# Patient Record
Sex: FEMALE | Race: BLACK OR AFRICAN AMERICAN | NOT HISPANIC OR LATINO | Employment: OTHER | URBAN - METROPOLITAN AREA
[De-identification: names, ages, dates, MRNs, and addresses within clinical notes are randomized per-mention and may not be internally consistent; named-entity substitution may affect disease eponyms.]

---

## 2017-05-07 ENCOUNTER — HOSPITAL ENCOUNTER (EMERGENCY)
Facility: HOSPITAL | Age: 62
Discharge: HOME | End: 2017-05-07
Attending: EMERGENCY MEDICINE
Payer: COMMERCIAL

## 2017-05-07 VITALS
DIASTOLIC BLOOD PRESSURE: 76 MMHG | RESPIRATION RATE: 22 BRPM | SYSTOLIC BLOOD PRESSURE: 116 MMHG | WEIGHT: 219 LBS | TEMPERATURE: 98 F | BODY MASS INDEX: 36.49 KG/M2 | OXYGEN SATURATION: 96 % | HEART RATE: 87 BPM | HEIGHT: 65 IN

## 2017-05-07 DIAGNOSIS — R06.02 SOB (SHORTNESS OF BREATH): ICD-10-CM

## 2017-05-07 DIAGNOSIS — R07.9 CHEST PAIN, UNSPECIFIED TYPE: Primary | ICD-10-CM

## 2017-05-07 LAB
ALBUMIN SERPL BCP-MCNC: 3.4 G/DL
ALP SERPL-CCNC: 67 U/L
ALT SERPL W/O P-5'-P-CCNC: 28 U/L
ANION GAP SERPL CALC-SCNC: 11 MMOL/L
AST SERPL-CCNC: 26 U/L
BASOPHILS # BLD AUTO: 0.05 K/UL
BASOPHILS NFR BLD: 0.9 %
BILIRUB SERPL-MCNC: 0.2 MG/DL
BILIRUB UR QL STRIP: NEGATIVE
BNP SERPL-MCNC: 15 PG/ML
BUN SERPL-MCNC: 13 MG/DL
CALCIUM SERPL-MCNC: 9.2 MG/DL
CHLORIDE SERPL-SCNC: 103 MMOL/L
CLARITY UR: CLEAR
CO2 SERPL-SCNC: 23 MMOL/L
COLOR UR: YELLOW
CREAT SERPL-MCNC: 0.8 MG/DL
DIFFERENTIAL METHOD: ABNORMAL
EOSINOPHIL # BLD AUTO: 0.1 K/UL
EOSINOPHIL NFR BLD: 2.4 %
ERYTHROCYTE [DISTWIDTH] IN BLOOD BY AUTOMATED COUNT: 14.2 %
EST. GFR  (AFRICAN AMERICAN): >60 ML/MIN/1.73 M^2
EST. GFR  (NON AFRICAN AMERICAN): >60 ML/MIN/1.73 M^2
GLUCOSE SERPL-MCNC: 190 MG/DL
GLUCOSE UR QL STRIP: NEGATIVE
HCT VFR BLD AUTO: 38.7 %
HGB BLD-MCNC: 13.2 G/DL
HGB UR QL STRIP: NEGATIVE
KETONES UR QL STRIP: NEGATIVE
LEUKOCYTE ESTERASE UR QL STRIP: NEGATIVE
LYMPHOCYTES # BLD AUTO: 1.3 K/UL
LYMPHOCYTES NFR BLD: 23.2 %
MCH RBC QN AUTO: 31.1 PG
MCHC RBC AUTO-ENTMCNC: 34.1 %
MCV RBC AUTO: 91 FL
MONOCYTES # BLD AUTO: 0.3 K/UL
MONOCYTES NFR BLD: 5.6 %
NEUTROPHILS # BLD AUTO: 3.9 K/UL
NEUTROPHILS NFR BLD: 67.7 %
NITRITE UR QL STRIP: NEGATIVE
PH UR STRIP: 6 [PH] (ref 5–8)
PLATELET # BLD AUTO: 218 K/UL
PMV BLD AUTO: 9.1 FL
POTASSIUM SERPL-SCNC: 4 MMOL/L
PROT SERPL-MCNC: 7.4 G/DL
PROT UR QL STRIP: NEGATIVE
RBC # BLD AUTO: 4.24 M/UL
SODIUM SERPL-SCNC: 137 MMOL/L
SP GR UR STRIP: >=1.03 (ref 1–1.03)
TROPONIN I SERPL DL<=0.01 NG/ML-MCNC: <0.006 NG/ML
URN SPEC COLLECT METH UR: ABNORMAL
UROBILINOGEN UR STRIP-ACNC: 1 EU/DL
WBC # BLD AUTO: 5.73 K/UL

## 2017-05-07 PROCEDURE — 85025 COMPLETE CBC W/AUTO DIFF WBC: CPT

## 2017-05-07 PROCEDURE — 25000003 PHARM REV CODE 250: Performed by: EMERGENCY MEDICINE

## 2017-05-07 PROCEDURE — 84484 ASSAY OF TROPONIN QUANT: CPT

## 2017-05-07 PROCEDURE — 80053 COMPREHEN METABOLIC PANEL: CPT

## 2017-05-07 PROCEDURE — 63600175 PHARM REV CODE 636 W HCPCS: Performed by: EMERGENCY MEDICINE

## 2017-05-07 PROCEDURE — 96374 THER/PROPH/DIAG INJ IV PUSH: CPT

## 2017-05-07 PROCEDURE — 81003 URINALYSIS AUTO W/O SCOPE: CPT

## 2017-05-07 PROCEDURE — 83880 ASSAY OF NATRIURETIC PEPTIDE: CPT

## 2017-05-07 PROCEDURE — 96375 TX/PRO/DX INJ NEW DRUG ADDON: CPT

## 2017-05-07 PROCEDURE — 99284 EMERGENCY DEPT VISIT MOD MDM: CPT | Mod: 25

## 2017-05-07 PROCEDURE — 93005 ELECTROCARDIOGRAM TRACING: CPT

## 2017-05-07 RX ORDER — BUDESONIDE AND FORMOTEROL FUMARATE DIHYDRATE 160; 4.5 UG/1; UG/1
2 AEROSOL RESPIRATORY (INHALATION) EVERY 12 HOURS
COMMUNITY

## 2017-05-07 RX ORDER — HYDROCHLOROTHIAZIDE 25 MG/1
25 TABLET ORAL DAILY
COMMUNITY
End: 2019-01-17

## 2017-05-07 RX ORDER — IBUPROFEN 600 MG/1
600 TABLET ORAL EVERY 6 HOURS PRN
Qty: 30 TABLET | Refills: 0 | Status: SHIPPED | OUTPATIENT
Start: 2017-05-07 | End: 2020-12-18

## 2017-05-07 RX ORDER — OMEPRAZOLE 20 MG/1
20 CAPSULE, DELAYED RELEASE ORAL DAILY
COMMUNITY
End: 2020-12-11 | Stop reason: SDUPTHER

## 2017-05-07 RX ORDER — FOLIC ACID 1 MG/1
1 TABLET ORAL DAILY
COMMUNITY
End: 2020-12-18

## 2017-05-07 RX ORDER — LORAZEPAM 1 MG/1
1 TABLET ORAL EVERY 6 HOURS PRN
COMMUNITY
End: 2019-01-17

## 2017-05-07 RX ORDER — SIMVASTATIN 40 MG/1
40 TABLET, FILM COATED ORAL NIGHTLY
COMMUNITY
End: 2019-01-17

## 2017-05-07 RX ORDER — HYDROXYCHLOROQUINE SULFATE 200 MG/1
200 TABLET, FILM COATED ORAL DAILY
COMMUNITY
End: 2022-06-02 | Stop reason: SDUPTHER

## 2017-05-07 RX ORDER — ASPIRIN 325 MG
325 TABLET ORAL
Status: COMPLETED | OUTPATIENT
Start: 2017-05-07 | End: 2017-05-07

## 2017-05-07 RX ORDER — INSULIN GLARGINE 100 [IU]/ML
42 INJECTION, SOLUTION SUBCUTANEOUS NIGHTLY
COMMUNITY
End: 2019-10-16 | Stop reason: SDUPTHER

## 2017-05-07 RX ORDER — KETOROLAC TROMETHAMINE 30 MG/ML
10 INJECTION, SOLUTION INTRAMUSCULAR; INTRAVENOUS
Status: COMPLETED | OUTPATIENT
Start: 2017-05-07 | End: 2017-05-07

## 2017-05-07 RX ORDER — METHOTREXATE 2.5 MG/1
TABLET ORAL
COMMUNITY
End: 2019-01-17

## 2017-05-07 RX ORDER — CIPROFLOXACIN 500 MG/1
500 TABLET ORAL
COMMUNITY
End: 2019-01-17

## 2017-05-07 RX ORDER — METHOCARBAMOL 750 MG/1
1500 TABLET, FILM COATED ORAL 3 TIMES DAILY
Qty: 30 TABLET | Refills: 0 | Status: SHIPPED | OUTPATIENT
Start: 2017-05-07 | End: 2017-05-12

## 2017-05-07 RX ORDER — DIAZEPAM 10 MG/2ML
5 INJECTION INTRAMUSCULAR
Status: COMPLETED | OUTPATIENT
Start: 2017-05-07 | End: 2017-05-07

## 2017-05-07 RX ADMIN — ASPIRIN 325 MG ORAL TABLET 325 MG: 325 PILL ORAL at 02:05

## 2017-05-07 RX ADMIN — KETOROLAC TROMETHAMINE 10 MG: 30 INJECTION, SOLUTION INTRAMUSCULAR at 03:05

## 2017-05-07 RX ADMIN — DIAZEPAM 5 MG: 5 INJECTION, SOLUTION INTRAMUSCULAR; INTRAVENOUS at 03:05

## 2017-05-07 NOTE — ED NOTES
Pt discharged home ambulatory with rx x 2 and instructions for home care and follow up care; pt stable

## 2017-05-07 NOTE — ED NOTES
Pt reports that she feels ready to go home; the pain is gone and the shortness of breath is much improved

## 2017-05-07 NOTE — DISCHARGE INSTRUCTIONS
Take medications as directed.  Follow up with your doctor this week.  Return to ER for any concerns or worsening symptoms.        Noncardiac Chest Pain    Based on your visit today, the health care provider doesnt know what is causing your chest pain. In most cases, people who come to the emergency department with chest pain dont have a problem with their heart. Instead, the pain is caused by other conditions. These may be problems with the lungs, muscles, bones, digestive tract, nerves, or mental health.  Lung problems  · Inflammation around the lungs (pleurisy)  · Collapsed lung (pneumothorax)  · Fluid around the lungs (pleural effusion)  · Lung cancer. This is a rare cause of chest pain.  Muscle or bone problems  · Inflamed cartilage between the ribs (pleurisy)  · Fibromyalgia  · Rheumatoid arthritis  Digestive system problems  · Reflux  · Stomach ulcer  · Spasms of the esophagus  · Gall stones  · Gallbladder inflammation  Mental health conditions  · Panic or anxiety attacks  · Emotional distress  Your condition doesnt seem serious and your pain doesnt appear to be coming from your heart. But sometimes the signs of a serious problem take more time to appear. Watch for the warning signs listed below.  Home care  Follow these guidelines when caring for yourself at home:  · Rest today and avoid strenuous activity.  · Take any prescribed medicine as directed.  Follow-up care  Follow up with your health care provider, or as advised, if you dont start to feel better within 24 hours.  When to seek medical advice  Call your health care provider right away if any of these occur:  · A change in the type of pain. Call if it feels different, becomes more serious, lasts longer, or begins to spread into your shoulder, arm, neck, jaw, or back.  · Shortness of breath  · You feel more pain when you breathe  · Cough with dark-colored mucus or blood  · Weakness, dizziness, or fainting  · Fever of 100.4ºF (38ºC) or higher, or  as directed by your health care provider  · Swelling, pain, or redness in one leg  Date Last Reviewed: 11/24/2014  © 7038-5483 [employer]. [address] [place] [address], [place] [address]. All rights reserved. This information is not intended as a substitute for professional medical care. Always follow your healthcare professional's instructions.

## 2017-05-07 NOTE — ED PROVIDER NOTES
Encounter Date: 5/7/2017       History     Chief Complaint   Patient presents with    Chest Pain     CP x 4 hours, mid sternal, radiates to right arm; laying down when pain started in shoulder.     Shortness of Breath     asthma hx, took inhaler tonight, did not relieve      Review of patient's allergies indicates:   Allergen Reactions    Penicillins     Phenergan [promethazine] Nausea Only    Xanax [alprazolam]      61F smoker with HTN, DM, Hep B, asthma, and RA presents with acute onset of chest pain with associated SOB.  CP began around 8pm last night.  She was about to go to bed.  She reports a pressure in her right chest that radiates to her right neck and shoulder and is worse with deep breaths.  It is not sore to touch.  She reports associated SOB.  She used her inhaler with no relief.  Symptoms are intermittent and severe when present.      The history is provided by the patient.     Past Medical History:   Diagnosis Date    Anxiety     Arthritis     Asthma     Back pain     Depression     Diabetes mellitus     Eczema     GERD (gastroesophageal reflux disease)     Hepatitis B     Hyperlipidemia     Hypertension     Seizures      History reviewed. No pertinent surgical history.  History reviewed. No pertinent family history.  Social History   Substance Use Topics    Smoking status: Current Every Day Smoker     Packs/day: 0.50    Smokeless tobacco: None    Alcohol use No     Review of Systems   Constitutional: Negative for chills and fever.   Respiratory: Positive for cough, shortness of breath and wheezing.    Cardiovascular: Positive for chest pain. Negative for palpitations.   All other systems reviewed and are negative.      Physical Exam   Initial Vitals   BP Pulse Resp Temp SpO2   05/07/17 0154 05/07/17 0154 05/07/17 0154 05/07/17 0154 05/07/17 0154   167/87 69 22 98 °F (36.7 °C) 99 %     Physical Exam    Nursing note and vitals reviewed.  Constitutional: She appears well-developed  and well-nourished. No distress.   HENT:   Head: Normocephalic.   Mouth/Throat: Oropharynx is clear and moist.   Eyes: Conjunctivae are normal.   Neck: Normal range of motion.   Cardiovascular: Normal rate, regular rhythm and normal heart sounds.   Pulmonary/Chest: Breath sounds normal. No respiratory distress.   Abdominal: Bowel sounds are normal. She exhibits no distension.   Musculoskeletal: Normal range of motion.   Neurological: She is alert and oriented to person, place, and time.   Skin: Skin is warm and dry.   Psychiatric: She has a normal mood and affect. Her behavior is normal.         ED Course   Procedures  Labs Reviewed   CBC W/ AUTO DIFFERENTIAL - Abnormal; Notable for the following:        Result Value    MCH 31.1 (*)     MPV 9.1 (*)     All other components within normal limits   COMPREHENSIVE METABOLIC PANEL   TROPONIN I   B-TYPE NATRIURETIC PEPTIDE   URINALYSIS     EKG Readings: (Independently Interpreted)   Initial Reading: No STEMI. Rhythm: Normal Sinus Rhythm. Heart Rate: 78. Ectopy: No Ectopy. Conduction: Normal. T Waves Flipped: III, AVF, V2, V3 and V4. Clinical Impression: Normal Sinus Rhythm          Medical Decision Making:   Independently Interpreted Test(s):   I have ordered and independently interpreted EKG Reading(s) - see prior notes  Clinical Tests:   Lab Tests: Ordered and Reviewed  Radiological Study: Ordered and Reviewed  Medical Tests: Reviewed and Ordered  ED Management:  R sided CP since last night.  CP >6 hours with no evidence of AMI.  No signs of mass, pneumonia, pneumothorax, or pulmonary edema.  I do not suspect PE.  I will treat with muscle relaxer.                   ED Course     Clinical Impression:   There were no encounter diagnoses.          Carmen Medina MD  06/02/17 1937

## 2017-05-07 NOTE — ED AVS SNAPSHOT
[facility] [address]  [place] [address]               Margarita Orourke   2017  2:11 AM   ED    Description:  Female : 1955   Department:  [facility]           Your Care was Coordinated By:     Provider Role From To    Carmen Medina MD Attending Provider 17 0227 --      Reason for Visit     Chest Pain     Shortness of Breath           Diagnoses this Visit        Comments    Chest pain, unspecified type    -  Primary     SOB (shortness of breath)           ED Disposition     None           To Do List           Follow-up Information     Follow up with Shivam Paredes MD.    Specialty:  Internal Medicine    Why:  As needed    Contact information:    [address] [place] [address]  [phone]         These Medications        Disp Refills Start End    methocarbamol (ROBAXIN) 750 MG Tab 30 tablet 0 2017    Take 2 tablets (1,500 mg total) by mouth 3 (three) times daily. - Oral    ibuprofen (ADVIL,MOTRIN) 600 MG tablet 30 tablet 0 2017     Take 1 tablet (600 mg total) by mouth every 6 (six) hours as needed for Pain. - Oral      [facility]     [facility] Care Line -  Assistance  Unless otherwise directed by your provider, please contact [facility], our nurse care line that is available for  assistance.     Registered nurses in the [facility] provide: appointment scheduling, clinical advisement, health education, and other advisory services.  Call: [phone] (toll free)               Medications           Message regarding Medications     Verify the changes and/or additions to your medication regime listed below are the same as discussed with your clinician today.  If any of these changes or additions are incorrect, please notify your healthcare provider.        START taking these NEW medications        Refills    methocarbamol (ROBAXIN) 750 MG Tab 0    Sig: Take 2  tablets (1,500 mg total) by mouth 3 (three) times daily.    Class: Print    Route: Oral    ibuprofen (ADVIL,MOTRIN) 600 MG tablet 0    Sig: Take 1 tablet (600 mg total) by mouth every 6 (six) hours as needed for Pain.    Class: Print    Route: Oral      These medications were administered today        Dose Freq    aspirin tablet 325 mg 325 mg ED 1 Time    Sig: Take 1 tablet (325 mg total) by mouth ED 1 Time.    Class: Normal    Route: Oral    ketorolac injection 10 mg 10 mg ED 1 Time    Sig: Inject 10 mg into the vein ED 1 Time.    Class: Normal    Route: Intravenous    diazePAM injection 5 mg 5 mg ED 1 Time    Sig: Inject 1 mL (5 mg total) into the vein ED 1 Time.    Class: Normal    Route: Intravenous           Verify that the below list of medications is an accurate representation of the medications you are currently taking.  If none reported, the list may be blank. If incorrect, please contact your healthcare provider. Carry this list with you in case of emergency.           Current Medications     budesonide-formoterol 160-4.5 mcg (SYMBICORT) 160-4.5 mcg/actuation HFAA Inhale 2 puffs into the lungs every 12 (twelve) hours. Controller    ciprofloxacin HCl (CIPRO) 500 MG tablet Take 500 mg by mouth every 12 (twelve) hours.    folic acid (FOLVITE) 1 MG tablet Take 1 mg by mouth once daily.    hydrochlorothiazide (HYDRODIURIL) 25 MG tablet Take 25 mg by mouth once daily.    hydroxychloroquine (PLAQUENIL) 200 mg tablet Take 200 mg by mouth once daily.    insulin glargine (LANTUS) 100 unit/mL injection Inject into the skin every evening.    lorazepam (ATIVAN) 1 MG tablet Take 1 mg by mouth every 6 (six) hours as needed for Anxiety.    methotrexate 2.5 MG Tab Take by mouth every 7 days.    omeprazole (PRILOSEC) 20 MG capsule Take 20 mg by mouth once daily.    simvastatin (ZOCOR) 40 MG tablet Take 40 mg by mouth every evening.    ibuprofen (ADVIL,MOTRIN) 600 MG tablet Take 1 tablet (600 mg total) by mouth every 6 (six)  "hours as needed for Pain.    methocarbamol (ROBAXIN) 750 MG Tab Take 2 tablets (1,500 mg total) by mouth 3 (three) times daily.           Clinical Reference Information           Your Vitals Were     BP Pulse Temp Resp Height Weight    116/76 (Patient Position: Sitting, BP Method: Automatic) 87 98 °F (36.7 °C) (Oral) 22 5' 5" (1.651 m) 99.3 kg (219 lb)    SpO2 BMI             96% 36.44 kg/m2         Allergies as of 5/7/2017        Reactions    Penicillins     Phenergan [Promethazine] Nausea Only    Xanax [Alprazolam]       Immunizations Administered on Date of Encounter - 5/7/2017     None      ED Micro, Lab, POCT     Start Ordered       Status Ordering Provider    05/07/17 0229 05/07/17 0229  CBC auto differential  STAT      Final result     05/07/17 0229 05/07/17 0229  Comprehensive metabolic panel  STAT      Final result     05/07/17 0229 05/07/17 0229  Troponin I  STAT      Final result     05/07/17 0229 05/07/17 0229  Brain natriuretic peptide  STAT      Final result     05/07/17 0229 05/07/17 0229  Urinalysis  STAT      Final result       ED Imaging Orders     Start Ordered       Status Ordering Provider    05/07/17 0229 05/07/17 0229  X-Ray Chest PA And Lateral  1 time imaging      Final result         Discharge Instructions       Take medications as directed.  Follow up with your doctor this week.  Return to ER for any concerns or worsening symptoms.        Noncardiac Chest Pain    Based on your visit today, the health care provider doesnt know what is causing your chest pain. In most cases, people who come to the emergency department with chest pain dont have a problem with their heart. Instead, the pain is caused by other conditions. These may be problems with the lungs, muscles, bones, digestive tract, nerves, or mental health.  Lung problems  · Inflammation around the lungs (pleurisy)  · Collapsed lung (pneumothorax)  · Fluid around the lungs (pleural effusion)  · Lung cancer. This is a rare cause of " chest pain.  Muscle or bone problems  · Inflamed cartilage between the ribs (pleurisy)  · Fibromyalgia  · Rheumatoid arthritis  Digestive system problems  · Reflux  · Stomach ulcer  · Spasms of the esophagus  · Gall stones  · Gallbladder inflammation  Mental health conditions  · Panic or anxiety attacks  · Emotional distress  Your condition doesnt seem serious and your pain doesnt appear to be coming from your heart. But sometimes the signs of a serious problem take more time to appear. Watch for the warning signs listed below.  Home care  Follow these guidelines when caring for yourself at home:  · Rest today and avoid strenuous activity.  · Take any prescribed medicine as directed.  Follow-up care  Follow up with your health care provider, or as advised, if you dont start to feel better within 24 hours.  When to seek medical advice  Call your health care provider right away if any of these occur:  · A change in the type of pain. Call if it feels different, becomes more serious, lasts longer, or begins to spread into your shoulder, arm, neck, jaw, or back.  · Shortness of breath  · You feel more pain when you breathe  · Cough with dark-colored mucus or blood  · Weakness, dizziness, or fainting  · Fever of 100.4ºF (38ºC) or higher, or as directed by your health care provider  · Swelling, pain, or redness in one leg  Date Last Reviewed: 11/24/2014  © 6570-3435 [employer]. [address] [place] [address]. All rights reserved. This information is not intended as a substitute for professional medical care. Always follow your healthcare professional's instructions.          [facility] Sign-Up     Activating your [facility] account is as easy as 1-2-3!     1) Visit [email or web address], select Sign Up Now, enter this activation code and your date of birth, then select Next.  [email or web address]  Expires: 6/21/2017  5:15 AM      2) Create a username and password to use when you visit [facility] in the  future and select a security question in case you lose your password and select Next.    3) Enter your e-mail address and click Sign Up!    Additional Information  If you have questions, please e-mail [email or web address] or call [phone] to talk to our [employer] staff. Remember, [facility] is NOT to be used for urgent needs. For medical emergencies, dial 911.         Smoking Cessation     If you would like to quit smoking:   You may be eligible for free services if you are a [place] resident and started smoking cigarettes before September 1, 1988.  Call the Smoking Cessation Trust (SCT) toll free at (813) 617-7064 or (018) 577-6470.   Call 3-857-QUIT-NOW if you do not meet the above criteria.   Contact us via email: [email or web address]   View our website for more information: [email or web address]         [facility] complies with applicable Federal civil rights laws and does not discriminate on the basis of race, color, national origin, age, disability, or sex.        Language Assistance Services     ATTENTION: Language assistance services are available, free of charge. Please call [phone].      ATENCIÓN: Si habla español, tiene a cote disposición servicios gratuitos de asistencia lingüística. Llame al [phone].     CHÚ Ý: N?u b?n nói Ti?ng Vi?t, có các d?ch v? h? tr? ngôn ng? mi?n phí dành cho b?n. G?i s? [phone].

## 2017-08-25 ENCOUNTER — HOSPITAL ENCOUNTER (OUTPATIENT)
Dept: RADIOLOGY | Facility: HOSPITAL | Age: 62
Discharge: HOME | End: 2017-08-25
Attending: PAIN MEDICINE
Payer: MEDICARE

## 2017-08-25 DIAGNOSIS — M19.019 OSTEOARTHROSIS, SHOULDER REGION: Primary | ICD-10-CM

## 2017-08-25 DIAGNOSIS — M19.019 OSTEOARTHROSIS, SHOULDER REGION: ICD-10-CM

## 2017-08-25 PROCEDURE — 73030 X-RAY EXAM OF SHOULDER: CPT | Mod: TC,50

## 2017-08-25 PROCEDURE — 73030 X-RAY EXAM OF SHOULDER: CPT | Mod: 26,50,, | Performed by: RADIOLOGY

## 2018-02-07 DIAGNOSIS — M06.9 RHA (RHEUMATOID ARTHRITIS): ICD-10-CM

## 2018-02-07 DIAGNOSIS — G89.29 CHRONIC PAIN: ICD-10-CM

## 2018-02-07 DIAGNOSIS — M47.896 OTHER SPONDYLOSIS, LUMBAR REGION: Primary | ICD-10-CM

## 2019-01-10 ENCOUNTER — NURSE TRIAGE (OUTPATIENT)
Dept: ADMINISTRATIVE | Facility: CLINIC | Age: 64
End: 2019-01-10

## 2019-01-11 NOTE — TELEPHONE ENCOUNTER
Answer Assessment - Initial Assessment Questions  Daughter reported pt seen a month ago at  ER -dx with vertigo. Since then she has been c/o of dizziness and feeling like she is going to pass out. Daughter reported her b/p keeps dropping, will be in the 135-140 systolic/80 diastolic ranges and then 20 min later will go to the 100's systolic and 60's diastolic. He was advised to cut her hctz to half, 12.5 mg and is on isosorbide 300 mg daughter stated. During triage daughter suddenly stated she is just going to take her to ER as she c/o's she feels like she is going to pass out.    Protocols used: ST DIZZINESS - VERTIGO-A-AH

## 2019-01-17 ENCOUNTER — HOSPITAL ENCOUNTER (EMERGENCY)
Facility: HOSPITAL | Age: 64
Discharge: HOME | End: 2019-01-17
Attending: EMERGENCY MEDICINE
Payer: MEDICARE

## 2019-01-17 VITALS
WEIGHT: 202 LBS | RESPIRATION RATE: 16 BRPM | DIASTOLIC BLOOD PRESSURE: 80 MMHG | SYSTOLIC BLOOD PRESSURE: 135 MMHG | BODY MASS INDEX: 33.66 KG/M2 | TEMPERATURE: 98 F | HEIGHT: 65 IN | OXYGEN SATURATION: 97 % | HEART RATE: 64 BPM

## 2019-01-17 DIAGNOSIS — F41.9 ANXIETY: Primary | ICD-10-CM

## 2019-01-17 DIAGNOSIS — I10 ELEVATED BLOOD PRESSURE READING WITH DIAGNOSIS OF HYPERTENSION: ICD-10-CM

## 2019-01-17 DIAGNOSIS — R42 DIZZINESS: ICD-10-CM

## 2019-01-17 DIAGNOSIS — Z72.0 TOBACCO ABUSE: ICD-10-CM

## 2019-01-17 LAB
ALBUMIN SERPL BCP-MCNC: 3.5 G/DL
ALP SERPL-CCNC: 77 U/L
ALT SERPL W/O P-5'-P-CCNC: 14 U/L
ANION GAP SERPL CALC-SCNC: 7 MMOL/L
AST SERPL-CCNC: 14 U/L
BASOPHILS # BLD AUTO: 0.02 K/UL
BASOPHILS NFR BLD: 0.4 %
BILIRUB SERPL-MCNC: 0.5 MG/DL
BILIRUB UR QL STRIP: NEGATIVE
BNP SERPL-MCNC: 107 PG/ML
BUN SERPL-MCNC: 9 MG/DL
CALCIUM SERPL-MCNC: 9.7 MG/DL
CHLORIDE SERPL-SCNC: 104 MMOL/L
CLARITY UR: CLEAR
CO2 SERPL-SCNC: 25 MMOL/L
COLOR UR: YELLOW
CREAT SERPL-MCNC: 0.8 MG/DL
DIFFERENTIAL METHOD: NORMAL
EOSINOPHIL # BLD AUTO: 0.1 K/UL
EOSINOPHIL NFR BLD: 1.8 %
ERYTHROCYTE [DISTWIDTH] IN BLOOD BY AUTOMATED COUNT: 13.3 %
EST. GFR  (AFRICAN AMERICAN): >60 ML/MIN/1.73 M^2
EST. GFR  (NON AFRICAN AMERICAN): >60 ML/MIN/1.73 M^2
GLUCOSE SERPL-MCNC: 161 MG/DL
GLUCOSE UR QL STRIP: NEGATIVE
HCT VFR BLD AUTO: 42 %
HGB BLD-MCNC: 14 G/DL
HGB UR QL STRIP: NEGATIVE
KETONES UR QL STRIP: NEGATIVE
LEUKOCYTE ESTERASE UR QL STRIP: NEGATIVE
LYMPHOCYTES # BLD AUTO: 1.7 K/UL
LYMPHOCYTES NFR BLD: 37.1 %
MCH RBC QN AUTO: 30.9 PG
MCHC RBC AUTO-ENTMCNC: 33.3 G/DL
MCV RBC AUTO: 93 FL
MONOCYTES # BLD AUTO: 0.3 K/UL
MONOCYTES NFR BLD: 7.1 %
NEUTROPHILS # BLD AUTO: 2.4 K/UL
NEUTROPHILS NFR BLD: 52.9 %
NITRITE UR QL STRIP: NEGATIVE
PH UR STRIP: 6 [PH] (ref 5–8)
PLATELET # BLD AUTO: 232 K/UL
PMV BLD AUTO: 9.6 FL
POCT GLUCOSE: 148 MG/DL (ref 70–110)
POTASSIUM SERPL-SCNC: 4 MMOL/L
PROT SERPL-MCNC: 7.9 G/DL
PROT UR QL STRIP: NEGATIVE
RBC # BLD AUTO: 4.53 M/UL
SODIUM SERPL-SCNC: 136 MMOL/L
SP GR UR STRIP: <=1.005 (ref 1–1.03)
TROPONIN I SERPL DL<=0.01 NG/ML-MCNC: <0.006 NG/ML
URN SPEC COLLECT METH UR: ABNORMAL
UROBILINOGEN UR STRIP-ACNC: NEGATIVE EU/DL
WBC # BLD AUTO: 4.5 K/UL

## 2019-01-17 PROCEDURE — 25000003 PHARM REV CODE 250: Performed by: NURSE PRACTITIONER

## 2019-01-17 PROCEDURE — 83880 ASSAY OF NATRIURETIC PEPTIDE: CPT

## 2019-01-17 PROCEDURE — 85025 COMPLETE CBC W/AUTO DIFF WBC: CPT

## 2019-01-17 PROCEDURE — 80053 COMPREHEN METABOLIC PANEL: CPT

## 2019-01-17 PROCEDURE — 81003 URINALYSIS AUTO W/O SCOPE: CPT

## 2019-01-17 PROCEDURE — 84484 ASSAY OF TROPONIN QUANT: CPT

## 2019-01-17 PROCEDURE — 99283 EMERGENCY DEPT VISIT LOW MDM: CPT

## 2019-01-17 RX ORDER — ALPRAZOLAM 0.25 MG/1
0.25 TABLET ORAL 3 TIMES DAILY
COMMUNITY
End: 2020-01-14 | Stop reason: SDUPTHER

## 2019-01-17 RX ORDER — ESCITALOPRAM OXALATE 5 MG/1
5 TABLET ORAL DAILY
COMMUNITY
End: 2020-04-16

## 2019-01-17 RX ORDER — IRBESARTAN 300 MG/1
300 TABLET ORAL NIGHTLY
COMMUNITY
End: 2020-08-03 | Stop reason: SDUPTHER

## 2019-01-17 RX ORDER — ALPRAZOLAM 0.25 MG/1
0.25 TABLET ORAL
Status: COMPLETED | OUTPATIENT
Start: 2019-01-17 | End: 2019-01-17

## 2019-01-17 RX ADMIN — ALPRAZOLAM 0.25 MG: 0.25 TABLET ORAL at 08:01

## 2019-01-17 NOTE — ED PROVIDER NOTES
"Encounter Date: 1/17/2019       History     Chief Complaint   Patient presents with    Dizziness     dizziness and panic attack since this morning.  Feels shaky inside.  Took an anxiety and bp medication PTA.     Patient is a 63-year-old smoker with HTN, DM, Hep B, asthma, and RA presents to ED for evaluation of dizziness and anxiety that began this am. Patient reports that she woke up this am, took her blood pressure, which was "176/106" and started having a "panic attack." Patient reports that she immediately took her BP medication and also took 0.25 mg of prescribed Xanax. Associates shakiness and SOB but does report that her SOB has resolved at this time.  Denies CP. Patient does report that all she has had for breakfast are "grapes." Denies any alleviating or exacerbating factors. Denies fever, chills, neck pains/stiffness, blurry vision, headache, N/V/D, abdominal pain, or any other concerns.         The history is provided by the patient.     Review of patient's allergies indicates:   Allergen Reactions    Penicillins     Phenergan [promethazine] Nausea Only    Xanax [alprazolam]      Past Medical History:   Diagnosis Date    Anxiety     Arthritis     Asthma     Back pain     Depression     Diabetes mellitus     Eczema     GERD (gastroesophageal reflux disease)     Hepatitis B     Hyperlipidemia     Hypertension     Seizures      History reviewed. No pertinent surgical history.  History reviewed. No pertinent family history.  Social History     Tobacco Use    Smoking status: Current Every Day Smoker     Packs/day: 0.50   Substance Use Topics    Alcohol use: No    Drug use: No     Review of Systems   Constitutional: Negative for chills and fever.        + Shaky    HENT: Negative for sore throat.    Eyes: Negative for visual disturbance.   Respiratory: Positive for shortness of breath (resolved). Negative for cough.    Cardiovascular: Negative for chest pain and leg swelling. "   Gastrointestinal: Negative for abdominal pain, diarrhea, nausea and vomiting.   Genitourinary: Negative for dysuria.   Musculoskeletal: Negative for back pain, neck pain and neck stiffness.   Skin: Negative for rash.   Allergic/Immunologic: Positive for immunocompromised state (RA).   Neurological: Positive for dizziness. Negative for syncope, facial asymmetry, speech difficulty, weakness, light-headedness, numbness and headaches.   Hematological: Does not bruise/bleed easily.   Psychiatric/Behavioral: The patient is nervous/anxious.    All other systems reviewed and are negative.      Physical Exam     Initial Vitals   BP Pulse Resp Temp SpO2   -- -- -- -- --      MAP       --         Physical Exam    Vitals reviewed.  Constitutional: She appears well-developed and well-nourished. She is not diaphoretic. She is cooperative.  Non-toxic appearance. She does not have a sickly appearance.   HENT:   Head: Atraumatic.   Mouth/Throat: Oropharynx is clear and moist.   Eyes: EOM are normal. Pupils are equal, round, and reactive to light.   Neck: Normal range of motion, full passive range of motion without pain and phonation normal. Neck supple.   Cardiovascular: Regular rhythm.   Pulmonary/Chest: Effort normal and breath sounds normal. No respiratory distress.   Abdominal: Soft. Normal appearance and bowel sounds are normal. There is no tenderness.   Neurological: She is alert and oriented to person, place, and time. She has normal strength. No sensory deficit. GCS eye subscore is 4. GCS verbal subscore is 5. GCS motor subscore is 6.   Clear, non-labored sentences. Normal facial symmetry. Normal finger-to-nose.   Skin: Skin is warm, dry and intact.   Psychiatric: Her mood appears anxious.         ED Course   Procedures  Labs Reviewed   COMPREHENSIVE METABOLIC PANEL - Abnormal; Notable for the following components:       Result Value    Glucose 161 (*)     Anion Gap 7 (*)     All other components within normal limits    URINALYSIS - Abnormal; Notable for the following components:    Specific Gravity, UA <=1.005 (*)     All other components within normal limits   B-TYPE NATRIURETIC PEPTIDE - Abnormal; Notable for the following components:     (*)     All other components within normal limits   POCT GLUCOSE - Abnormal; Notable for the following components:    POCT Glucose 148 (*)     All other components within normal limits   CBC W/ AUTO DIFFERENTIAL   TROPONIN I          Imaging Results          CT Head Without Contrast (Final result)  Result time 01/17/19 09:26:08    Final result by Napoleon Morgan MD (01/17/19 09:26:08)                 Impression:      No acute abnormality.      Electronically signed by: Napoleon Morgan MD  Date:    01/17/2019  Time:    09:26             Narrative:    EXAMINATION:  CT HEAD WITHOUT CONTRAST    CLINICAL HISTORY:  Dizziness;    TECHNIQUE:  Low dose axial CT images obtained throughout the head without intravenous contrast. Sagittal and coronal reconstructions were performed.    COMPARISON:  None.    FINDINGS:  Intracranial compartment:    Ventricles and sulci are normal in size for age without evidence of hydrocephalus. No extra-axial blood or fluid collections.    The brain parenchyma appears normal. No parenchymal mass, hemorrhage, edema or major vascular distribution infarct.  Partially empty sella configuration noted.    Skull/extracranial contents (limited evaluation): No fracture. Mastoid air cells and paranasal sinuses are essentially clear.                               X-Ray Chest PA And Lateral (Final result)  Result time 01/17/19 08:30:25    Final result by Alhaji Vasquez MD (01/17/19 08:30:25)                 Impression:      Minimal interstitial lung markings of the lower lung zones bilaterally, not significantly changed when compared to prior exam.  Findings may represent subsegmental atelectasis, pneumonia, or pulmonary edema.      Electronically signed by: Alhaji Vasquez  "MD  Date:    01/17/2019  Time:    08:30             Narrative:    EXAMINATION:  XR CHEST PA AND LATERAL    CLINICAL HISTORY:  Dizziness and giddiness    TECHNIQUE:  PA and lateral views of the chest were performed.    COMPARISON:  Chest radiograph from 05/07/2017    FINDINGS:  No cardiomegaly.  Normal pulmonary vasculature.  Mild amount of subtle interstitial lung markings of the lower lung zones bilaterally, not significantly changed when compared to prior exam.  Findings may represent subsegmental atelectasis, pneumonia, or pulmonary edema.  No pleural effusion.  No pneumothorax.  Surgical clips in the right upper quadrant.  Osseous structures are unremarkable.                                 Medical Decision Making:   History:   Old Medical Records: I decided to obtain old medical records.  Initial Assessment:   Patient presents to ED for evaluation of dizziness and anxiety that began this a.m. Appears anxious and non-toxic. Afebrile. VSS. MM moist.  Clear, nonlabored sentences.  Normal facial symmetry.  Normal finger-to-nose.  Respirations even and unlabored. Lungs CTA. No respiratory distress.    Clinical Tests:   Lab Tests: Reviewed and Ordered  The following lab test(s) were unremarkable: CBC, Troponin, CMP and Urinalysis  Radiological Study: Reviewed and Ordered  ED Management:  Labs, UA, EKG, CXR, CT head, orthostatics, IV, PO xanax, POCT glucose       9:06 AM- Patient reassessed and reports that she is "feeling better."  Other:   I discussed test(s) with the performing physician.       <> Summary of the Findings: Care of this patient discussed with Dr. Morrell who agrees with ED course and disposition.  BNP slightly elevated. Troponin negative. CT head normal and no focal neuro deficits. CXR shows minimal interstitial lung markings of the lower lung zones bilaterally, not significantly changed when compared to prior exam.  Findings may represent subsegmental atelectasis, pneumonia, or pulmonary edema. " Negative orthostatics. Low suspicion for ACS, Heart score of 3.  Patient is hemodynamically stable and will be DC home.  Instructed to follow up with PCP in 1-2 days and to return to ED for any concerns or worsening symptoms.  Patient verbalized understanding, compliance, and agreement with treatment plan.     Additional MDM:   Heart Score:    History:          Slightly suspicious.  ECG:             Normal  Age:               45-65 years  Risk factors: >= 3 risk factors or history of atherosclerotic disease  Troponin:       Less than or equal to normal limit  Final Score: 3               Attending Attestation:     Physician Attestation Statement for NP/PA:   I discussed this assessment and plan of this patient with the NP/PA, but I did not personally examine the patient. The face to face encounter was performed by the NP/PA.                     Clinical Impression:   The primary encounter diagnosis was Anxiety. Diagnoses of Dizziness, Elevated blood pressure reading with diagnosis of hypertension, and Tobacco abuse were also pertinent to this visit.                             Al Yeboah NP  01/17/19 3600       Ethan Morrell MD  01/17/19 5724

## 2019-01-17 NOTE — ED NOTES
"Pt seen in the ED with complaints of dizziness and a panic attack. The pt has been experiencing dizziness and problems with balance for the past 4 months. Pt stated "I took my BP medication today and it was too high". Per son report "She saw her blood pressure too high and she started getting worried". Pt stable, will continue to monitor.   "

## 2019-01-29 ENCOUNTER — HOSPITAL ENCOUNTER (EMERGENCY)
Facility: HOSPITAL | Age: 64
Discharge: HOME | End: 2019-01-29
Attending: EMERGENCY MEDICINE
Payer: MEDICARE

## 2019-01-29 VITALS
OXYGEN SATURATION: 94 % | HEIGHT: 65 IN | WEIGHT: 202 LBS | HEART RATE: 77 BPM | SYSTOLIC BLOOD PRESSURE: 159 MMHG | DIASTOLIC BLOOD PRESSURE: 84 MMHG | TEMPERATURE: 98 F | BODY MASS INDEX: 33.66 KG/M2 | RESPIRATION RATE: 23 BRPM

## 2019-01-29 DIAGNOSIS — I10 HYPERTENSION: Primary | ICD-10-CM

## 2019-01-29 DIAGNOSIS — Z86.59 HISTORY OF ANXIETY: ICD-10-CM

## 2019-01-29 LAB
ALBUMIN SERPL BCP-MCNC: 3.3 G/DL
ALP SERPL-CCNC: 73 U/L
ALT SERPL W/O P-5'-P-CCNC: 12 U/L
ANION GAP SERPL CALC-SCNC: 8 MMOL/L
AST SERPL-CCNC: 10 U/L
BASOPHILS # BLD AUTO: 0.04 K/UL
BASOPHILS NFR BLD: 0.7 %
BILIRUB SERPL-MCNC: 0.4 MG/DL
BUN SERPL-MCNC: 11 MG/DL
CALCIUM SERPL-MCNC: 9.2 MG/DL
CHLORIDE SERPL-SCNC: 105 MMOL/L
CO2 SERPL-SCNC: 26 MMOL/L
CREAT SERPL-MCNC: 0.8 MG/DL
DIFFERENTIAL METHOD: NORMAL
EOSINOPHIL # BLD AUTO: 0.1 K/UL
EOSINOPHIL NFR BLD: 2.6 %
ERYTHROCYTE [DISTWIDTH] IN BLOOD BY AUTOMATED COUNT: 13.2 %
EST. GFR  (AFRICAN AMERICAN): >60 ML/MIN/1.73 M^2
EST. GFR  (NON AFRICAN AMERICAN): >60 ML/MIN/1.73 M^2
GLUCOSE SERPL-MCNC: 179 MG/DL
HCT VFR BLD AUTO: 38.4 %
HGB BLD-MCNC: 12.8 G/DL
LYMPHOCYTES # BLD AUTO: 2.1 K/UL
LYMPHOCYTES NFR BLD: 38.7 %
MCH RBC QN AUTO: 30.6 PG
MCHC RBC AUTO-ENTMCNC: 33.3 G/DL
MCV RBC AUTO: 92 FL
MONOCYTES # BLD AUTO: 0.3 K/UL
MONOCYTES NFR BLD: 4.8 %
NEUTROPHILS # BLD AUTO: 2.9 K/UL
NEUTROPHILS NFR BLD: 53 %
PLATELET # BLD AUTO: 216 K/UL
PMV BLD AUTO: 9.6 FL
POTASSIUM SERPL-SCNC: 3.7 MMOL/L
PROT SERPL-MCNC: 6.7 G/DL
RBC # BLD AUTO: 4.18 M/UL
SODIUM SERPL-SCNC: 139 MMOL/L
TROPONIN I SERPL DL<=0.01 NG/ML-MCNC: 0.01 NG/ML
WBC # BLD AUTO: 5.45 K/UL

## 2019-01-29 PROCEDURE — 93010 ELECTROCARDIOGRAM REPORT: CPT | Mod: ,,, | Performed by: INTERNAL MEDICINE

## 2019-01-29 PROCEDURE — 84484 ASSAY OF TROPONIN QUANT: CPT

## 2019-01-29 PROCEDURE — 93010 EKG 12-LEAD: ICD-10-PCS | Mod: ,,, | Performed by: INTERNAL MEDICINE

## 2019-01-29 PROCEDURE — 85025 COMPLETE CBC W/AUTO DIFF WBC: CPT

## 2019-01-29 PROCEDURE — 25000003 PHARM REV CODE 250: Performed by: EMERGENCY MEDICINE

## 2019-01-29 PROCEDURE — 99283 EMERGENCY DEPT VISIT LOW MDM: CPT | Mod: 25

## 2019-01-29 PROCEDURE — 80053 COMPREHEN METABOLIC PANEL: CPT

## 2019-01-29 PROCEDURE — 93005 ELECTROCARDIOGRAM TRACING: CPT

## 2019-01-29 RX ORDER — AMLODIPINE BESYLATE 5 MG/1
5 TABLET ORAL
Status: DISCONTINUED | OUTPATIENT
Start: 2019-01-29 | End: 2019-01-29

## 2019-01-29 RX ORDER — AMLODIPINE BESYLATE 5 MG/1
10 TABLET ORAL
Status: DISCONTINUED | OUTPATIENT
Start: 2019-01-29 | End: 2019-01-29 | Stop reason: HOSPADM

## 2019-01-29 RX ORDER — AMLODIPINE BESYLATE 5 MG/1
5 TABLET ORAL EVERY MORNING
Qty: 30 TABLET | Refills: 11 | Status: SHIPPED | OUTPATIENT
Start: 2019-01-29 | End: 2019-01-29 | Stop reason: SDUPTHER

## 2019-01-29 RX ORDER — LORAZEPAM 1 MG/1
1 TABLET ORAL
Status: COMPLETED | OUTPATIENT
Start: 2019-01-29 | End: 2019-01-29

## 2019-01-29 RX ORDER — AMLODIPINE BESYLATE 5 MG/1
5 TABLET ORAL EVERY MORNING
Qty: 30 TABLET | Refills: 0 | Status: SHIPPED | OUTPATIENT
Start: 2019-01-29 | End: 2019-10-10

## 2019-01-29 RX ADMIN — LORAZEPAM 1 MG: 1 TABLET ORAL at 02:01

## 2019-01-29 NOTE — ED PROVIDER NOTES
Encounter Date: 1/29/2019    SCRIBE #1 NOTE: I, Keri Coughlin, am scribing for, and in the presence of,  Dr. Clifton. I have scribed the entire note.       History     Chief Complaint   Patient presents with    Hypertension     reports BP at home was 180/88. Reports headache X hours, described as burning to back of head and dizziness. Reports dizzy X 1 hour, reports has gotten gradually worse.      Margarita Orourke is a 63 y.o. female who  has a past medical history of Anxiety, Arthritis, Asthma, Back pain, Depression, Diabetes mellitus, Eczema, GERD (gastroesophageal reflux disease), Hepatitis B, Hyperlipidemia, Hypertension, and Seizures.    The patient presents to the ED due to elevated blood pressure. She reports onset of symptoms was just prior to arrival in the ED. The patient states she checked her blood pressure and noted it was elevated to 180/88. She has associated headache described as burning and light headedness but denies any tingling, numbness, neck pain, changes in vision, chest pain or shortness of breath. The patient attributes the increase in blood pressure to a panic attack. She reports she was unable to take her anxiety medication.  Patient also had intermittent headache yesterday when her blood pressure was also noted to be elevated.  She denies sudden onset of headache symptoms..       The history is provided by the patient.     Review of patient's allergies indicates:   Allergen Reactions    Zantac [ranitidine hcl] Nausea And Vomiting    Penicillins     Phenergan [promethazine] Nausea Only     Past Medical History:   Diagnosis Date    Anxiety     Arthritis     Asthma     Back pain     Depression     Diabetes mellitus     Eczema     GERD (gastroesophageal reflux disease)     Hepatitis B     Hyperlipidemia     Hypertension     Seizures      No past surgical history on file.  No family history on file.  Social History     Tobacco Use    Smoking status: Current Every Day Smoker      Packs/day: 0.50   Substance Use Topics    Alcohol use: No    Drug use: No     Review of Systems   Constitutional: Negative for chills and fever.   HENT: Negative for congestion, rhinorrhea and sore throat.    Eyes: Negative for redness and visual disturbance.   Respiratory: Negative for cough, shortness of breath and wheezing.    Cardiovascular: Negative for chest pain and palpitations.   Gastrointestinal: Negative for abdominal pain, diarrhea, nausea and vomiting.   Genitourinary: Negative for dysuria and hematuria.   Musculoskeletal: Negative for back pain, myalgias and neck pain.   Skin: Negative for rash.   Neurological: Positive for dizziness and headaches. Negative for weakness and light-headedness.   Psychiatric/Behavioral: Negative for confusion. The patient is nervous/anxious.        Physical Exam     Initial Vitals [01/29/19 0151]   BP Pulse Resp Temp SpO2   (!) 188/86 74 20 98 °F (36.7 °C) 98 %      MAP       --         Physical Exam    Nursing note and vitals reviewed.  Constitutional: She appears well-developed and well-nourished. She is not diaphoretic. No distress.   HENT:   Head: Normocephalic and atraumatic.   Mouth/Throat: Oropharynx is clear and moist.   Eyes: Conjunctivae and EOM are normal. Pupils are equal, round, and reactive to light.   Neck: Normal range of motion. Neck supple.   Cardiovascular: Normal rate, regular rhythm and normal heart sounds. Exam reveals no gallop and no friction rub.    No murmur heard.  Pulmonary/Chest: Breath sounds normal. She has no wheezes. She has no rhonchi. She has no rales.   Abdominal: Soft. Bowel sounds are normal. There is no tenderness. There is no rebound and no guarding.   Musculoskeletal: Normal range of motion. She exhibits no edema or tenderness.   Lymphadenopathy:     She has no cervical adenopathy.   Neurological: She is alert and oriented to person, place, and time. She has normal strength.   CN 2-12 intact  Finger to nose within normal  limits  Negative romberg  Gait normal  Equal strength to bilateral upper extremities, SILT  Equal strength to bilateral lower extremities, SILT     Skin: Skin is warm and dry. Capillary refill takes less than 2 seconds. No rash noted.         ED Course   Procedures  Labs Reviewed   COMPREHENSIVE METABOLIC PANEL - Abnormal; Notable for the following components:       Result Value    Glucose 179 (*)     Albumin 3.3 (*)     All other components within normal limits   CBC W/ AUTO DIFFERENTIAL   TROPONIN I     EKG Readings: (Independently Interpreted)   Normal sinus rhythm with a rate of 64. T wave inversion in lead II, III and IV. No STEMI. Compared to prior tracing 5/5/17, shows no significant change       Imaging Results    None          Medical Decision Making:   Initial Assessment:   This is a 63 y.o female who presents with headache, hypertension and dizziness  Differential Diagnosis:   Differential Diagnosis includes, but is not limited to:  Hypertensive encephalopathy, CVA/TIA, intracranial hemorrhage, MI/ACS, ahypertensive nephropathy, medication non-compliance, anxiety, essential hypertension    Independently Interpreted Test(s):   I have ordered and independently interpreted EKG Reading(s) - see prior notes  Clinical Tests:   Lab Tests: Ordered and Reviewed  Medical Tests: Ordered and Reviewed  ED Management:    63-year-old female history of hypertension and anxiety presents with intermittent headache with associated dizziness as well as elevated blood pressure associated with anxiety.  Patient has had similar presentations in the past and was discharged after workup was negative. Patient is vitally stable here she is neuro intact there is no features of her headache concerning for subarachnoid hemorrhage or any acute pathology.  Patient was given Ativan and amlodipine given her history of anxiety and for her blood pressure with resolution of headache dizziness and anxiousness.       After complete evaluation,  including thorough history and physical exam, the patient was found to have asymptomatic elevated blood pressure, likely secondary to  chronic uncontrolled HTN. The patient's symptoms are not consistent with SAH/intracranial bleed. Physical exam is benign without focal weakness, sensory deficit, or cerebellar dysfunction to suggest acute stroke or intracranial mass. There is no meningismus, fever, or other evidence of infection to suggest meningitis/encephalitis. There is no other evidence of end-organ damage consistent with hypertensive urgency/emergency at time of this evaluation. I do not feel further ED evaluation or intervention is warranted. The patient was treated with ativan/ amlopdipine and improved. The patient was counseled extensively on medication compliance and was instructed to follow-up with a PCP for further management of elevated BP.                        Clinical Impression:     1. Hypertension    2. History of anxiety        Disposition:   Disposition: Discharged  Condition: Stable    Scribe attestation I, Obi Clifton,  personally performed the services described in this documentation. All medical record entries made by the scribe were at my direction and in my presence.  I have reviewed the chart and agree that the record reflects my personal performance and is accurate and complete. Obi Clifton M.D. 1:00 AM01/31/2019                      Obi Clifton Jr., MD  01/31/19 0100

## 2019-01-29 NOTE — ED NOTES
Pt presents to ER with HTN x 2 nights. Reports B/P 180/88. She has headache, dizziness, and pain and burning to back of scalp x 1 hr and its getting worse.

## 2019-02-04 ENCOUNTER — HOSPITAL ENCOUNTER (EMERGENCY)
Facility: HOSPITAL | Age: 64
Discharge: HOME | End: 2019-02-04
Attending: EMERGENCY MEDICINE
Payer: MEDICARE

## 2019-02-04 VITALS
SYSTOLIC BLOOD PRESSURE: 131 MMHG | DIASTOLIC BLOOD PRESSURE: 76 MMHG | RESPIRATION RATE: 20 BRPM | OXYGEN SATURATION: 94 % | TEMPERATURE: 98 F | WEIGHT: 200 LBS | HEIGHT: 65 IN | HEART RATE: 57 BPM | BODY MASS INDEX: 33.32 KG/M2

## 2019-02-04 DIAGNOSIS — R07.9 CHEST PAIN: ICD-10-CM

## 2019-02-04 LAB
ALBUMIN SERPL BCP-MCNC: 3 G/DL
ALP SERPL-CCNC: 72 U/L
ALT SERPL W/O P-5'-P-CCNC: 11 U/L
ANION GAP SERPL CALC-SCNC: 6 MMOL/L
AST SERPL-CCNC: 10 U/L
BASOPHILS # BLD AUTO: 0.03 K/UL
BASOPHILS NFR BLD: 0.5 %
BILIRUB SERPL-MCNC: 0.2 MG/DL
BNP SERPL-MCNC: 26 PG/ML
BUN SERPL-MCNC: 11 MG/DL
CALCIUM SERPL-MCNC: 9.1 MG/DL
CHLORIDE SERPL-SCNC: 104 MMOL/L
CO2 SERPL-SCNC: 26 MMOL/L
CREAT SERPL-MCNC: 0.8 MG/DL
DIFFERENTIAL METHOD: ABNORMAL
EOSINOPHIL # BLD AUTO: 0.2 K/UL
EOSINOPHIL NFR BLD: 3.6 %
ERYTHROCYTE [DISTWIDTH] IN BLOOD BY AUTOMATED COUNT: 13.2 %
EST. GFR  (AFRICAN AMERICAN): >60 ML/MIN/1.73 M^2
EST. GFR  (NON AFRICAN AMERICAN): >60 ML/MIN/1.73 M^2
GLUCOSE SERPL-MCNC: 210 MG/DL
HCT VFR BLD AUTO: 36.3 %
HGB BLD-MCNC: 12 G/DL
LYMPHOCYTES # BLD AUTO: 1.9 K/UL
LYMPHOCYTES NFR BLD: 34.8 %
MCH RBC QN AUTO: 30.3 PG
MCHC RBC AUTO-ENTMCNC: 33.1 G/DL
MCV RBC AUTO: 92 FL
MONOCYTES # BLD AUTO: 0.6 K/UL
MONOCYTES NFR BLD: 10.8 %
NEUTROPHILS # BLD AUTO: 2.8 K/UL
NEUTROPHILS NFR BLD: 50.1 %
PLATELET # BLD AUTO: 221 K/UL
PMV BLD AUTO: 9.4 FL
POTASSIUM SERPL-SCNC: 3.6 MMOL/L
PROT SERPL-MCNC: 6.7 G/DL
RBC # BLD AUTO: 3.96 M/UL
SODIUM SERPL-SCNC: 136 MMOL/L
TROPONIN I SERPL DL<=0.01 NG/ML-MCNC: <0.006 NG/ML
TROPONIN I SERPL DL<=0.01 NG/ML-MCNC: <0.006 NG/ML
WBC # BLD AUTO: 5.57 K/UL

## 2019-02-04 PROCEDURE — 80053 COMPREHEN METABOLIC PANEL: CPT

## 2019-02-04 PROCEDURE — 85025 COMPLETE CBC W/AUTO DIFF WBC: CPT

## 2019-02-04 PROCEDURE — 84484 ASSAY OF TROPONIN QUANT: CPT | Mod: 91

## 2019-02-04 PROCEDURE — 93005 ELECTROCARDIOGRAM TRACING: CPT

## 2019-02-04 PROCEDURE — 83880 ASSAY OF NATRIURETIC PEPTIDE: CPT

## 2019-02-04 PROCEDURE — 99285 EMERGENCY DEPT VISIT HI MDM: CPT | Mod: 25

## 2019-02-04 RX ORDER — NITROGLYCERIN 0.4 MG/1
0.4 TABLET SUBLINGUAL EVERY 5 MIN PRN
Status: DISCONTINUED | OUTPATIENT
Start: 2019-02-04 | End: 2019-02-04 | Stop reason: HOSPADM

## 2019-02-04 RX ORDER — ASPIRIN 325 MG
325 TABLET ORAL
Status: DISCONTINUED | OUTPATIENT
Start: 2019-02-04 | End: 2019-02-04 | Stop reason: HOSPADM

## 2019-02-04 NOTE — PROVIDER PROGRESS NOTES - EMERGENCY DEPT.
Encounter Date: 2/4/2019    ED Physician Progress Notes       SCRIBE NOTE: I, Luis Dean, am scribing for, and in the presence of,  Dr. May.  Physician Statement: I, Dr. May, personally performed the services described in this documentation as scribed by Luis Dean in my presence, and it is both accurate and complete.          This is an assumption of care note.     Upon shift change, the patient was transferred to me from Dr. Castaneda @ 5:54 AM in stable condition.     Patient presented to ED with chief complaint of CP.   Asymptomatic in the ER.  Initial workup unremarkable, including negative initial troponin.  On reassessment, patient is chest pain-free and denies any complaints currently.  Plan to repeat troponin and reassess.    Update:   No acute events during shift.  Delta troponin negative.  Patient remained asymptomatic.  No evidence of ACS, patient stable for D/C.  Recommend close f/u with PCP/Cardiology as scheduled later this week for further evaluation.  Return to ED for worsening symptoms, return of pain, or any other concerns.      EKG:  NSR, rate 61, T-wave inversions anterior in inferior leads, no reciprocal elevations or other signs of ischemia, normal intervals.  Compared with prior EKG dated 01/2019, grossly stable without significant change.      IMAGING:  Imaging Results          X-Ray Chest PA And Lateral (Final result)  Result time 02/04/19 05:18:48    Final result by Inocente Finley MD (02/04/19 05:18:48)                 Impression:      No significant detrimental change from prior study, noting stable coarse interstitial markings.  No new confluent airspace consolidation.      Electronically signed by: Inocente Finley MD  Date:    02/04/2019  Time:    05:18             Narrative:    EXAMINATION:  XR CHEST PA AND LATERAL    CLINICAL HISTORY:  Chest Pain;    TECHNIQUE:  PA and lateral views of the chest were performed.    COMPARISON:  Chest radiograph 01/17/2019,  05/07/2017    FINDINGS:  Cardiomediastinal silhouette is unchanged.  The lungs appear symmetrically expanded.  There is stable coarse interstitial lung markings bilaterally, most notably at the lung bases, unchanged from prior examination.  No new confluent airspace consolidation.  There is no pleural effusion or pneumothorax.  Visualized osseous structures demonstrate stable degenerative changes.                                  LABS:  Labs Reviewed   CBC W/ AUTO DIFFERENTIAL - Abnormal; Notable for the following components:       Result Value    RBC 3.96 (*)     Hematocrit 36.3 (*)     All other components within normal limits   COMPREHENSIVE METABOLIC PANEL - Abnormal; Notable for the following components:    Glucose 210 (*)     Albumin 3.0 (*)     Anion Gap 6 (*)     All other components within normal limits   TROPONIN I   B-TYPE NATRIURETIC PEPTIDE   TROPONIN I         MEDICATIONS:  Medications   aspirin tablet 325 mg (325 mg Oral Not Given 2/4/19 0400)   nitroGLYCERIN SL tablet 0.4 mg (not administered)         IMPRESSION:  1. Chest pain           DISCHARGE MEDICATIONS:  Current Discharge Medication List            DISPOSITION:  D/C in stable condition.

## 2019-02-04 NOTE — ED NOTES
Patient stated she felt like she was having a minor anxiety attack. She could feel burning in her legs that was working its way up. When nurse got into room she was feeling better. Will let MD know

## 2019-02-04 NOTE — ED PROVIDER NOTES
Encounter Date: 2/4/2019    SCRIBE #1 NOTE: I, Keri Coughlin, am scribing for, and in the presence of,  Dr. Castaneda. I have scribed the entire note.       History     Chief Complaint   Patient presents with    Chest Pain     chest pain that woke pt up from sleep. reports hx of anxiety. States feels like anxiety, but does not normally have chest pain with anxiety.      Margarita Orourke is a 63 y.o. female who  has a past medical history of Anxiety, Arthritis, Asthma, Back pain, Depression, Diabetes mellitus, Eczema, GERD (gastroesophageal reflux disease), Hepatitis B, Hyperlipidemia, Hypertension, and Seizures.    The patient presents to the ED due to chest pain. She reports onset of symptoms was about 1 hr ago. The patient states she woke from sleep with the pain. It is located to the left chest. The patient describes the pain as sharp and lasted for about 7 minutes. The pain resolved after being giving Nitroglycerin by EMS. She also notes her blood pressure was elevated to the 180's systolic. The patient denies any associated shortness of breath, leg swelling, cough, congestion, nausea or vomiting but admits to palpitations.       The history is provided by the patient.     Review of patient's allergies indicates:   Allergen Reactions    Zantac [ranitidine hcl] Nausea And Vomiting    Penicillins     Phenergan [promethazine] Nausea Only     Past Medical History:   Diagnosis Date    Anxiety     Arthritis     Asthma     Back pain     Depression     Diabetes mellitus     Eczema     GERD (gastroesophageal reflux disease)     Hepatitis B     Hyperlipidemia     Hypertension     Seizures      No past surgical history on file.  No family history on file.  Social History     Tobacco Use    Smoking status: Current Every Day Smoker     Packs/day: 0.50   Substance Use Topics    Alcohol use: No    Drug use: No     Review of Systems   Constitutional: Negative for chills and fever.   HENT: Negative for  congestion, rhinorrhea and sore throat.    Eyes: Negative for redness and visual disturbance.   Respiratory: Negative for cough, shortness of breath and wheezing.    Cardiovascular: Positive for chest pain and palpitations.   Gastrointestinal: Negative for abdominal pain, diarrhea, nausea and vomiting.   Genitourinary: Negative for dysuria and hematuria.   Musculoskeletal: Negative for back pain, myalgias and neck pain.   Skin: Negative for rash.   Neurological: Negative for dizziness, weakness and light-headedness.   Psychiatric/Behavioral: Negative for confusion.       Physical Exam     Initial Vitals [02/04/19 0351]   BP Pulse Resp Temp SpO2   116/64 65 18 97.9 °F (36.6 °C) 97 %      MAP       --         Physical Exam    Nursing note and vitals reviewed.  Constitutional: She appears well-developed and well-nourished. She is not diaphoretic. No distress.   HENT:   Head: Normocephalic and atraumatic.   Mouth/Throat: Oropharynx is clear and moist.   Eyes: Conjunctivae and EOM are normal. Pupils are equal, round, and reactive to light.   Neck: Normal range of motion. Neck supple.   Cardiovascular: Normal rate, regular rhythm and normal heart sounds. Exam reveals no gallop and no friction rub.    No murmur heard.  Pulmonary/Chest: Breath sounds normal. She has no wheezes. She has no rhonchi. She has no rales.   Abdominal: Soft. Bowel sounds are normal. There is no tenderness. There is no rebound and no guarding.   Musculoskeletal: Normal range of motion. She exhibits no edema or tenderness.   Lymphadenopathy:     She has no cervical adenopathy.   Neurological: She is alert and oriented to person, place, and time. She has normal strength.   Skin: Skin is warm and dry. Capillary refill takes less than 2 seconds. No rash noted.         ED Course   Procedures  Labs Reviewed   CBC W/ AUTO DIFFERENTIAL - Abnormal; Notable for the following components:       Result Value    RBC 3.96 (*)     Hematocrit 36.3 (*)     All other  components within normal limits   COMPREHENSIVE METABOLIC PANEL - Abnormal; Notable for the following components:    Glucose 210 (*)     Albumin 3.0 (*)     Anion Gap 6 (*)     All other components within normal limits   TROPONIN I   TROPONIN I   B-TYPE NATRIURETIC PEPTIDE        ECG Results          EKG 12-lead (Final result)  Result time 02/05/19 06:46:45    Final result by Interface, Lab In [facility] (02/05/19 06:46:45)                 Narrative:    Test Reason : R07.9,    Vent. Rate : 061 BPM     Atrial Rate : 061 BPM     P-R Int : 152 ms          QRS Dur : 080 ms      QT Int : 414 ms       P-R-T Axes : 055 033 006 degrees     QTc Int : 416 ms    Normal sinus rhythm  T wave abnormality, consider anterior ischemia  Abnormal ECG  When compared with ECG of 29-JAN-2019 02:17,  No significant change was found  Confirmed by Miriam Hilario MD ([id number]) on 2/5/2019 6:46:43 AM    Referred By: [email or web address]   SELF           Confirmed By:Miriam Hilario MD                             EKG 12-LEAD (Final result)  Result time 02/07/19 15:49:31              Imaging Results          X-Ray Chest PA And Lateral (Final result)  Result time 02/04/19 05:18:48    Final result by Inocente Finley MD (02/04/19 05:18:48)                 Impression:      No significant detrimental change from prior study, noting stable coarse interstitial markings.  No new confluent airspace consolidation.      Electronically signed by: Inocente Finley MD  Date:    02/04/2019  Time:    05:18             Narrative:    EXAMINATION:  XR CHEST PA AND LATERAL    CLINICAL HISTORY:  Chest Pain;    TECHNIQUE:  PA and lateral views of the chest were performed.    COMPARISON:  Chest radiograph 01/17/2019, 05/07/2017    FINDINGS:  Cardiomediastinal silhouette is unchanged.  The lungs appear symmetrically expanded.  There is stable coarse interstitial lung markings bilaterally, most notably at the lung bases, unchanged from prior examination.  No new confluent  airspace consolidation.  There is no pleural effusion or pneumothorax.  Visualized osseous structures demonstrate stable degenerative changes.                                 Medical Decision Making:   Initial Assessment:   The patient presents to the ED due to chest pain.  Differential Diagnosis:   Myocardial ischemia, pericarditis, pulmonary embolus, chest wall pain, pleural inflammation and pulmonary infectious causes.    ED Management:  Patient handed off to Dr. May at 6 AM for follow up repeat troponin and disposition                    ED Course as of Feb 12 1333   Mon Feb 04, 2019   0358 EKG with NSR, rate of 61 bpm.  Anterior TWIs, inferior T wave flattening.  Normal intervals, normal conduction.  No STEMI  [LD]   0424 BP: 116/64 [LD]   0424 Temp: 97.9 °F (36.6 °C) [LD]   0424 Pulse: 65 [LD]   0424 Resp: 18 [LD]      ED Course User Index  [LD] Gregoria Castaneda MD     Clinical Impression:     1. Chest pain         I, Gregoria Castaneda,  personally performed the services described in this documentation. All medical record entries made by the scribe were at my direction and in my presence.  I have reviewed the chart and agree that the record reflects my personal performance and is accurate and complete. Gregoria Castaneda M.D. 1:33 PM02/12/2019                   Gregoria Castaneda MD  02/12/19 [id number]

## 2019-02-04 NOTE — ED TRIAGE NOTES
Patient brought in by EMS. Patient stated she woke up in the middle of the night feeling like she was having a panic attack with mid sternal chest pain. Patient states she has been having high BP even with her daily meds. Patient is scheduled for a cardiac appt on the . Patient states every since she was diagnosed with vertigo a few months ago her BP has been high along with low sugar which she thinks is causing her anxiety. Reports BP is highest in the middle of night. EMS gave sublingual nitro along with paste. Chest pain has now resolved. Patient appears calm and not anxious.    Review of patient's allergies indicates:   Allergen Reactions    Zantac [ranitidine hcl] Nausea And Vomiting    Penicillins     Phenergan [promethazine] Nausea Only        Patient has verified the spelling of their name and  on armband.   APPEARANCE: Patient is alert, calm, oriented x 4, and does not appear distressed.  SKIN: Skin is normal for race, warm, and dry. Normal skin turgor and mucous membranes moist.  CARDIAC: Normal rate and rhythm, no murmur heard. Denies chest pain at the moment  RESPIRATORY:Normal rate and effort. Breath sounds clear bilaterally throughout chest. Respirations are equal and unlabored.    GASTRO: Bowel sounds normal, abdomen is soft, no tenderness, and no abdominal distention.  MUSCLE: Full ROM. No bony tenderness or soft tissue tenderness. No obvious deformity.    
99

## 2019-07-26 ENCOUNTER — HOSPITAL ENCOUNTER (OUTPATIENT)
Facility: HOSPITAL | Age: 64
Discharge: HOME | End: 2019-07-27
Admitting: HOSPITALIST
Payer: MEDICARE

## 2019-07-26 DIAGNOSIS — R07.9 CHEST PAIN: Primary | ICD-10-CM

## 2019-07-26 DIAGNOSIS — J18.9 PNEUMONIA OF RIGHT LOWER LOBE DUE TO INFECTIOUS ORGANISM: ICD-10-CM

## 2019-07-26 DIAGNOSIS — I50.9 CHF (CONGESTIVE HEART FAILURE): ICD-10-CM

## 2019-07-26 PROBLEM — Z86.79 HISTORY OF HYPERTENSION: Status: ACTIVE | Noted: 2019-07-26

## 2019-07-26 PROBLEM — I95.2 HYPOTENSION DUE TO DRUGS: Status: ACTIVE | Noted: 2019-07-26

## 2019-07-26 PROBLEM — R93.89 ABNORMAL CXR: Status: ACTIVE | Noted: 2019-07-26

## 2019-07-26 LAB
ALBUMIN SERPL BCP-MCNC: 3.4 G/DL (ref 3.5–5.2)
ALP SERPL-CCNC: 84 U/L (ref 55–135)
ALT SERPL W/O P-5'-P-CCNC: 16 U/L (ref 10–44)
ANION GAP SERPL CALC-SCNC: 9 MMOL/L (ref 8–16)
AORTIC ROOT ANNULUS: 2.32 CM
AST SERPL-CCNC: 15 U/L (ref 10–40)
AV INDEX (PROSTH): 0.71
AV MEAN GRADIENT: 10 MMHG
AV PEAK GRADIENT: 18 MMHG
AV VALVE AREA: 1.56 CM2
AV VELOCITY RATIO: 0.57
BASOPHILS # BLD AUTO: 0.04 K/UL (ref 0–0.2)
BASOPHILS NFR BLD: 0.7 % (ref 0–1.9)
BILIRUB SERPL-MCNC: 0.4 MG/DL (ref 0.1–1)
BSA FOR ECHO PROCEDURE: 2.06 M2
BUN SERPL-MCNC: 14 MG/DL (ref 8–23)
CALCIUM SERPL-MCNC: 9.7 MG/DL (ref 8.7–10.5)
CHLORIDE SERPL-SCNC: 106 MMOL/L (ref 95–110)
CO2 SERPL-SCNC: 23 MMOL/L (ref 23–29)
CREAT SERPL-MCNC: 0.8 MG/DL (ref 0.5–1.4)
CV ECHO LV RWT: 0.54 CM
DIFFERENTIAL METHOD: ABNORMAL
DOP CALC AO PEAK VEL: 2.1 M/S
DOP CALC AO VTI: 38 CM
DOP CALC LVOT AREA: 2.2 CM2
DOP CALC LVOT DIAMETER: 1.67 CM
DOP CALC LVOT PEAK VEL: 1.2 M/S
DOP CALC LVOT STROKE VOLUME: 59.11 CM3
DOP CALCLVOT PEAK VEL VTI: 27 CM
E WAVE DECELERATION TIME: 278.45 MSEC
E/A RATIO: 1.03
ECHO LV POSTERIOR WALL: 1.12 CM (ref 0.6–1.1)
EOSINOPHIL # BLD AUTO: 0.2 K/UL (ref 0–0.5)
EOSINOPHIL NFR BLD: 4.2 % (ref 0–8)
ERYTHROCYTE [DISTWIDTH] IN BLOOD BY AUTOMATED COUNT: 14.1 % (ref 11.5–14.5)
EST. GFR  (AFRICAN AMERICAN): >60 ML/MIN/1.73 M^2
EST. GFR  (NON AFRICAN AMERICAN): >60 ML/MIN/1.73 M^2
FRACTIONAL SHORTENING: 34 % (ref 28–44)
GLUCOSE SERPL-MCNC: 167 MG/DL (ref 70–110)
HCT VFR BLD AUTO: 37.2 % (ref 37–48.5)
HGB BLD-MCNC: 12.3 G/DL (ref 12–16)
INTERVENTRICULAR SEPTUM: 1.14 CM (ref 0.6–1.1)
LA MAJOR: 4.61 CM
LA MINOR: 4.25 CM
LA WIDTH: 3.31 CM
LACTATE SERPL-SCNC: 1.2 MMOL/L (ref 0.5–2.2)
LEFT ATRIUM SIZE: 3.24 CM
LEFT ATRIUM VOLUME INDEX: 21.1 ML/M2
LEFT ATRIUM VOLUME: 40.32 CM3
LEFT INTERNAL DIMENSION IN SYSTOLE: 2.73 CM (ref 2.1–4)
LEFT VENTRICLE DIASTOLIC VOLUME INDEX: 40.19 ML/M2
LEFT VENTRICLE DIASTOLIC VOLUME: 76.84 ML
LEFT VENTRICLE MASS INDEX: 84 G/M2
LEFT VENTRICLE SYSTOLIC VOLUME INDEX: 14.6 ML/M2
LEFT VENTRICLE SYSTOLIC VOLUME: 27.87 ML
LEFT VENTRICULAR INTERNAL DIMENSION IN DIASTOLE: 4.16 CM (ref 3.5–6)
LEFT VENTRICULAR MASS: 160.86 G
LV LATERAL E/E' RATIO: 12.88 M/S
LYMPHOCYTES # BLD AUTO: 2.5 K/UL (ref 1–4.8)
LYMPHOCYTES NFR BLD: 43.5 % (ref 18–48)
MCH RBC QN AUTO: 31.1 PG (ref 27–31)
MCHC RBC AUTO-ENTMCNC: 33.1 G/DL (ref 32–36)
MCV RBC AUTO: 94 FL (ref 82–98)
MONOCYTES # BLD AUTO: 0.5 K/UL (ref 0.3–1)
MONOCYTES NFR BLD: 8.5 % (ref 4–15)
MV PEAK A VEL: 1 M/S
MV PEAK E VEL: 1.03 M/S
NEUTROPHILS # BLD AUTO: 2.5 K/UL (ref 1.8–7.7)
NEUTROPHILS NFR BLD: 43.1 % (ref 38–73)
PISA TR MAX VEL: 2.68 M/S
PLATELET # BLD AUTO: 215 K/UL (ref 150–350)
PMV BLD AUTO: 9.7 FL (ref 9.2–12.9)
POCT GLUCOSE: 127 MG/DL (ref 70–110)
POCT GLUCOSE: 135 MG/DL (ref 70–110)
POCT GLUCOSE: 155 MG/DL (ref 70–110)
POCT GLUCOSE: 205 MG/DL (ref 70–110)
POTASSIUM SERPL-SCNC: 3.9 MMOL/L (ref 3.5–5.1)
PROCALCITONIN SERPL IA-MCNC: 0.02 NG/ML
PROT SERPL-MCNC: 7.2 G/DL (ref 6–8.4)
PULM VEIN S/D RATIO: 1.15
PV PEAK D VEL: 0.67 M/S
PV PEAK S VEL: 0.77 M/S
RA MAJOR: 4.14 CM
RA PRESSURE: 3 MMHG
RBC # BLD AUTO: 3.96 M/UL (ref 4–5.4)
RIGHT VENTRICULAR END-DIASTOLIC DIMENSION: 2.7 CM
SODIUM SERPL-SCNC: 138 MMOL/L (ref 136–145)
TDI LATERAL: 0.08 M/S
TR MAX PG: 29 MMHG
TROPONIN I SERPL DL<=0.01 NG/ML-MCNC: 0.01 NG/ML (ref 0–0.03)
TROPONIN I SERPL DL<=0.01 NG/ML-MCNC: 0.01 NG/ML (ref 0–0.03)
TROPONIN I SERPL DL<=0.01 NG/ML-MCNC: <0.006 NG/ML (ref 0–0.03)
TV REST PULMONARY ARTERY PRESSURE: 32 MMHG
WBC # BLD AUTO: 5.77 K/UL (ref 3.9–12.7)

## 2019-07-26 PROCEDURE — 96361 HYDRATE IV INFUSION ADD-ON: CPT

## 2019-07-26 PROCEDURE — 96366 THER/PROPH/DIAG IV INF ADDON: CPT

## 2019-07-26 PROCEDURE — 84145 PROCALCITONIN (PCT): CPT

## 2019-07-26 PROCEDURE — 99291 CRITICAL CARE FIRST HOUR: CPT | Mod: 25

## 2019-07-26 PROCEDURE — 25000003 PHARM REV CODE 250

## 2019-07-26 PROCEDURE — 93010 ELECTROCARDIOGRAM REPORT: CPT | Mod: ,,, | Performed by: INTERNAL MEDICINE

## 2019-07-26 PROCEDURE — 85025 COMPLETE CBC W/AUTO DIFF WBC: CPT

## 2019-07-26 PROCEDURE — 87040 BLOOD CULTURE FOR BACTERIA: CPT | Mod: 59

## 2019-07-26 PROCEDURE — 83605 ASSAY OF LACTIC ACID: CPT

## 2019-07-26 PROCEDURE — 63600175 PHARM REV CODE 636 W HCPCS: Performed by: HOSPITALIST

## 2019-07-26 PROCEDURE — 80053 COMPREHEN METABOLIC PANEL: CPT

## 2019-07-26 PROCEDURE — 25000242 PHARM REV CODE 250 ALT 637 W/ HCPCS: Performed by: HOSPITALIST

## 2019-07-26 PROCEDURE — 93010 EKG 12-LEAD: ICD-10-PCS | Mod: ,,, | Performed by: INTERNAL MEDICINE

## 2019-07-26 PROCEDURE — 63600175 PHARM REV CODE 636 W HCPCS

## 2019-07-26 PROCEDURE — 25000003 PHARM REV CODE 250: Performed by: HOSPITALIST

## 2019-07-26 PROCEDURE — 63600175 PHARM REV CODE 636 W HCPCS: Performed by: EMERGENCY MEDICINE

## 2019-07-26 PROCEDURE — 96365 THER/PROPH/DIAG IV INF INIT: CPT | Mod: 59

## 2019-07-26 PROCEDURE — 93005 ELECTROCARDIOGRAM TRACING: CPT

## 2019-07-26 PROCEDURE — 84484 ASSAY OF TROPONIN QUANT: CPT | Mod: 91

## 2019-07-26 PROCEDURE — 82962 GLUCOSE BLOOD TEST: CPT

## 2019-07-26 PROCEDURE — G0378 HOSPITAL OBSERVATION PER HR: HCPCS

## 2019-07-26 PROCEDURE — 84484 ASSAY OF TROPONIN QUANT: CPT

## 2019-07-26 PROCEDURE — 36415 COLL VENOUS BLD VENIPUNCTURE: CPT

## 2019-07-26 RX ORDER — ALPRAZOLAM 0.25 MG/1
0.25 TABLET ORAL 3 TIMES DAILY
Status: DISCONTINUED | OUTPATIENT
Start: 2019-07-26 | End: 2019-07-27 | Stop reason: HOSPADM

## 2019-07-26 RX ORDER — PANTOPRAZOLE SODIUM 40 MG/1
40 TABLET, DELAYED RELEASE ORAL DAILY
Status: DISCONTINUED | OUTPATIENT
Start: 2019-07-26 | End: 2019-07-27 | Stop reason: HOSPADM

## 2019-07-26 RX ORDER — LEVOFLOXACIN 5 MG/ML
750 INJECTION, SOLUTION INTRAVENOUS
Status: DISCONTINUED | OUTPATIENT
Start: 2019-07-26 | End: 2019-07-27 | Stop reason: HOSPADM

## 2019-07-26 RX ORDER — SODIUM CHLORIDE 9 MG/ML
INJECTION, SOLUTION INTRAVENOUS CONTINUOUS
Status: ACTIVE | OUTPATIENT
Start: 2019-07-26 | End: 2019-07-27

## 2019-07-26 RX ORDER — RAMELTEON 8 MG/1
8 TABLET ORAL NIGHTLY PRN
Status: DISCONTINUED | OUTPATIENT
Start: 2019-07-26 | End: 2019-07-27 | Stop reason: HOSPADM

## 2019-07-26 RX ORDER — ASPIRIN 325 MG
325 TABLET, DELAYED RELEASE (ENTERIC COATED) ORAL
Status: COMPLETED | OUTPATIENT
Start: 2019-07-26 | End: 2019-07-26

## 2019-07-26 RX ORDER — IBUPROFEN 200 MG
24 TABLET ORAL
Status: DISCONTINUED | OUTPATIENT
Start: 2019-07-26 | End: 2019-07-27 | Stop reason: HOSPADM

## 2019-07-26 RX ORDER — SODIUM CHLORIDE 0.9 % (FLUSH) 0.9 %
10 SYRINGE (ML) INJECTION
Status: DISCONTINUED | OUTPATIENT
Start: 2019-07-26 | End: 2019-07-27 | Stop reason: HOSPADM

## 2019-07-26 RX ORDER — ENOXAPARIN SODIUM 100 MG/ML
30 INJECTION SUBCUTANEOUS EVERY 24 HOURS
Status: DISCONTINUED | OUTPATIENT
Start: 2019-07-26 | End: 2019-07-26

## 2019-07-26 RX ORDER — FOLIC ACID 1 MG/1
1 TABLET ORAL DAILY
Status: DISCONTINUED | OUTPATIENT
Start: 2019-07-26 | End: 2019-07-27 | Stop reason: HOSPADM

## 2019-07-26 RX ORDER — CALCIUM GLUCONATE 98 MG/ML
0.5 INJECTION, SOLUTION INTRAVENOUS
Status: DISCONTINUED | OUTPATIENT
Start: 2019-07-26 | End: 2019-07-26

## 2019-07-26 RX ORDER — CANDESARTAN 16 MG/1
16 TABLET ORAL NIGHTLY
Status: DISCONTINUED | OUTPATIENT
Start: 2019-07-26 | End: 2019-07-27 | Stop reason: HOSPADM

## 2019-07-26 RX ORDER — GLUCAGON 1 MG
10 KIT INJECTION
Status: DISCONTINUED | OUTPATIENT
Start: 2019-07-26 | End: 2019-07-26

## 2019-07-26 RX ORDER — ACETAMINOPHEN 325 MG/1
650 TABLET ORAL EVERY 8 HOURS PRN
Status: DISCONTINUED | OUTPATIENT
Start: 2019-07-26 | End: 2019-07-27 | Stop reason: HOSPADM

## 2019-07-26 RX ORDER — ALPRAZOLAM 0.25 MG/1
0.25 TABLET ORAL 2 TIMES DAILY PRN
Status: DISCONTINUED | OUTPATIENT
Start: 2019-07-26 | End: 2019-07-27 | Stop reason: HOSPADM

## 2019-07-26 RX ORDER — ACETAMINOPHEN 325 MG/1
650 TABLET ORAL EVERY 4 HOURS PRN
Status: DISCONTINUED | OUTPATIENT
Start: 2019-07-26 | End: 2019-07-27 | Stop reason: HOSPADM

## 2019-07-26 RX ORDER — GLUCAGON 1 MG
1 KIT INJECTION
Status: DISCONTINUED | OUTPATIENT
Start: 2019-07-26 | End: 2019-07-27 | Stop reason: HOSPADM

## 2019-07-26 RX ORDER — ONDANSETRON 2 MG/ML
4 INJECTION INTRAMUSCULAR; INTRAVENOUS EVERY 8 HOURS PRN
Status: DISCONTINUED | OUTPATIENT
Start: 2019-07-26 | End: 2019-07-27 | Stop reason: HOSPADM

## 2019-07-26 RX ORDER — IRBESARTAN 300 MG/1
300 TABLET ORAL NIGHTLY
Status: DISCONTINUED | OUTPATIENT
Start: 2019-07-26 | End: 2019-07-26

## 2019-07-26 RX ORDER — POLYETHYLENE GLYCOL 3350 17 G/17G
17 POWDER, FOR SOLUTION ORAL DAILY
Status: DISCONTINUED | OUTPATIENT
Start: 2019-07-26 | End: 2019-07-27 | Stop reason: HOSPADM

## 2019-07-26 RX ORDER — LEVOFLOXACIN 5 MG/ML
750 INJECTION, SOLUTION INTRAVENOUS
Status: COMPLETED | OUTPATIENT
Start: 2019-07-26 | End: 2019-07-26

## 2019-07-26 RX ORDER — ENOXAPARIN SODIUM 100 MG/ML
40 INJECTION SUBCUTANEOUS EVERY 24 HOURS
Status: DISCONTINUED | OUTPATIENT
Start: 2019-07-26 | End: 2019-07-27 | Stop reason: HOSPADM

## 2019-07-26 RX ORDER — GLUCAGON 1 MG
1 KIT INJECTION
Status: DISCONTINUED | OUTPATIENT
Start: 2019-07-26 | End: 2019-07-26

## 2019-07-26 RX ORDER — ESCITALOPRAM OXALATE 5 MG/1
5 TABLET ORAL DAILY
Status: DISCONTINUED | OUTPATIENT
Start: 2019-07-26 | End: 2019-07-27 | Stop reason: HOSPADM

## 2019-07-26 RX ORDER — FLUTICASONE FUROATE AND VILANTEROL 100; 25 UG/1; UG/1
1 POWDER RESPIRATORY (INHALATION) DAILY
Status: DISCONTINUED | OUTPATIENT
Start: 2019-07-26 | End: 2019-07-27 | Stop reason: HOSPADM

## 2019-07-26 RX ORDER — IBUPROFEN 200 MG
16 TABLET ORAL
Status: DISCONTINUED | OUTPATIENT
Start: 2019-07-26 | End: 2019-07-27 | Stop reason: HOSPADM

## 2019-07-26 RX ORDER — INSULIN ASPART 100 [IU]/ML
0-5 INJECTION, SOLUTION INTRAVENOUS; SUBCUTANEOUS
Status: DISCONTINUED | OUTPATIENT
Start: 2019-07-26 | End: 2019-07-27 | Stop reason: HOSPADM

## 2019-07-26 RX ADMIN — FOLIC ACID 1 MG: 1 TABLET ORAL at 12:07

## 2019-07-26 RX ADMIN — SODIUM CHLORIDE: 0.9 INJECTION, SOLUTION INTRAVENOUS at 11:07

## 2019-07-26 RX ADMIN — SODIUM CHLORIDE 500 ML: 0.9 INJECTION, SOLUTION INTRAVENOUS at 05:07

## 2019-07-26 RX ADMIN — ALPRAZOLAM 0.25 MG: 0.25 TABLET ORAL at 02:07

## 2019-07-26 RX ADMIN — PANTOPRAZOLE SODIUM 40 MG: 40 TABLET, DELAYED RELEASE ORAL at 12:07

## 2019-07-26 RX ADMIN — ESCITALOPRAM OXALATE 5 MG: 5 TABLET, FILM COATED ORAL at 12:07

## 2019-07-26 RX ADMIN — FLUTICASONE FUROATE AND VILANTEROL TRIFENATATE 1 PUFF: 100; 25 POWDER RESPIRATORY (INHALATION) at 12:07

## 2019-07-26 RX ADMIN — ASPIRIN 325 MG: 325 TABLET, COATED ORAL at 05:07

## 2019-07-26 RX ADMIN — ALPRAZOLAM 0.25 MG: 0.25 TABLET ORAL at 08:07

## 2019-07-26 RX ADMIN — LEVOFLOXACIN 750 MG: 750 INJECTION, SOLUTION INTRAVENOUS at 08:07

## 2019-07-26 NOTE — ED PROVIDER NOTES
"Encounter Date: 7/26/2019    SCRIBE #1 NOTE: I, Gabriela Jessica, am scribing for, and in the presence of,  Farhad De Santiago MD. I have scribed the entire note.       History     Chief Complaint   Patient presents with    Chest Pain     Patient complains of chest pain accompanied by R arm pain that started 30 minutes ago PTA " constant pressure." accompanied by SOB.      Time seen by provider: 4:39 AM    This is a 63 y.o. female who presents with complaint of R sided CP and SOB that began 30 minutes ago. CP is described as "constant pressure;" SOB is exacerbated with deep breathing. Her associated symptoms include R arm pain, and her BP was 154/98. The patient denies any other symptoms. Home therapy includes taking 10 mg amlodipine, which is 2x her daily dose, and 300 mg of irbesartan. Upon exam, her BP is significantly lowered as well as her HR, and she reports improvement in her pain. Her PMHx includes anxiety attacks, HTN, DM, HLD, and GERD. No other palliative or provocative factors except as noted.    The history is provided by the patient.     Review of patient's allergies indicates:   Allergen Reactions    Zantac [ranitidine hcl] Nausea And Vomiting    Penicillins     Phenergan [promethazine] Nausea Only     Past Medical History:   Diagnosis Date    Anxiety     Arthritis     Asthma     Back pain     Depression     Diabetes mellitus     Eczema     GERD (gastroesophageal reflux disease)     Hepatitis B     Hyperlipidemia     Hypertension     Seizures      No past surgical history on file.  No family history on file.  Social History     Tobacco Use    Smoking status: Current Every Day Smoker     Packs/day: 0.50   Substance Use Topics    Alcohol use: No    Drug use: No     Review of Systems   All other systems reviewed and are negative.      Physical Exam     Initial Vitals [07/26/19 0447]   BP Pulse Resp Temp SpO2   (!) 94/56 (!) 44 16 -- 96 %      MAP       --         Physical Exam    Nursing " note and vitals reviewed.  Constitutional: She appears well-developed.   HENT:   Head: Normocephalic and atraumatic.   Eyes: EOM are normal.   Neck: Neck supple.   Cardiovascular: Regular rhythm.   Bradycardic; hypotensive   Pulmonary/Chest: Breath sounds normal. No respiratory distress.   Abdominal: Soft. There is no tenderness.   Musculoskeletal: Normal range of motion.   No acute deformity   Neurological: She is alert and oriented to person, place, and time.   Skin: Skin is warm and dry.   Psychiatric: She has a normal mood and affect.         ED Course   Critical Care  Date/Time: 7/26/2019 5:09 AM  Performed by: Farhad De Santiago MD  Authorized by: Farhad De Santiago MD   Total critical care time (exclusive of procedural time) : 35 minutes        Labs Reviewed   CBC W/ AUTO DIFFERENTIAL - Abnormal; Notable for the following components:       Result Value    RBC 3.96 (*)     Mean Corpuscular Hemoglobin 31.1 (*)     All other components within normal limits   COMPREHENSIVE METABOLIC PANEL   TROPONIN I     EKG Readings: (Independently Interpreted)   Sinus bradycardia rate 44.  No ectopy.  Diffuse T-wave flattening with T-wave inversions in lateral leads.  No STEMI.  .  Abnormal EKG.         X-Rays:   Independently Interpreted Readings:   Other Readings:  Reviewed by myself, read by radiology.     Imaging Results          X-Ray Chest AP Portable (In process)               Medical Decision Making:   Initial Assessment:   This is a 63 y.o. female who presents with complaint of R sided CP and SOB that began 30 minutes ago.  Clinical Tests:   Lab Tests: Ordered and Reviewed  Radiological Study: Ordered and Reviewed  Medical Tests: Ordered and Reviewed  ED Management:  Initial study ordered and treatment initiated.  Patient turned over to oncoming physician awaiting study results and final disposition.                        Clinical Impression:       ICD-10-CM ICD-9-CM   1. Calcium channel blocker overdose,  accidental or unintentional, initial encounter T46.1X1A 972.9     E858.3   2. Chest pain R07.9 786.50                  I personally performed the services described in this documentation. All medical record entries made by the scribe were at my direction and in my presence.  I have reviewed the chart and agree that the record reflects my personal performance and is accurate and complete within the limitations of emergency medical charting.   --Farhad De Santiago M.D. 5:36 AM 07/26/2019           Farhad De Santiago MD  07/26/19 0539

## 2019-07-26 NOTE — ED NOTES
"Pt presents to ED with c/o chest pain. Pt states cp started 30 min ago and states she "took a beta blocker" because she was having cp and trouble breathing. Pt states she "took Amlodopine 10 mg, but she normally takes 5 mg". Pt describes cp as "pressure when breathing" and says pain over rt breast and mid-sternal without radiation. Denies n/v. Pt states she has a history of panic attacks. Aaox3. Pt anxious and restless. Bradycardia and hypotension noted upon assessment. No SOB or diaphoresis noted at this time. Pt denies weakness, dizziness or light-headedness.  "

## 2019-07-26 NOTE — ED NOTES
Pt lying down in bed talking with family members. Pt states cp 7 on 1/10 pain scale. RN assisted pt reposition for comfort. Resp even and unlabored. No SOB noted at this time.

## 2019-07-26 NOTE — SUBJECTIVE & OBJECTIVE
Past Medical History:   Diagnosis Date    Anxiety     Arthritis     Asthma     Back pain     Depression     Diabetes mellitus     Eczema     GERD (gastroesophageal reflux disease)     Hepatitis B     Hyperlipidemia     Hypertension     Seizures        No past surgical history on file.    Review of patient's allergies indicates:   Allergen Reactions    Zantac [ranitidine hcl] Nausea And Vomiting    Penicillins     Phenergan [promethazine] Nausea Only       No current facility-administered medications on file prior to encounter.      Current Outpatient Medications on File Prior to Encounter   Medication Sig    ALPRAZolam (XANAX) 0.25 MG tablet Take 0.25 mg by mouth 3 (three) times daily.    amLODIPine (NORVASC) 5 MG tablet Take 1 tablet (5 mg total) by mouth every morning.    budesonide-formoterol 160-4.5 mcg (SYMBICORT) 160-4.5 mcg/actuation HFAA Inhale 2 puffs into the lungs every 12 (twelve) hours. Controller    escitalopram oxalate (LEXAPRO) 5 MG Tab Take 5 mg by mouth once daily.    folic acid (FOLVITE) 1 MG tablet Take 1 mg by mouth once daily.    hydroxychloroquine (PLAQUENIL) 200 mg tablet Take 200 mg by mouth once daily.    ibuprofen (ADVIL,MOTRIN) 600 MG tablet Take 1 tablet (600 mg total) by mouth every 6 (six) hours as needed for Pain.    insulin glargine (LANTUS) 100 unit/mL injection Inject into the skin every evening.    irbesartan (AVAPRO) 300 MG tablet Take 300 mg by mouth every evening.    omeprazole (PRILOSEC) 20 MG capsule Take 20 mg by mouth once daily.     Family History     None        Tobacco Use    Smoking status: Current Every Day Smoker     Packs/day: 0.50   Substance and Sexual Activity    Alcohol use: No    Drug use: No    Sexual activity: Not on file     Review of Systems   Constitutional: Positive for chills and fatigue. Negative for activity change and fever.   HENT: Negative for congestion and nosebleeds.    Respiratory: Positive for cough. Negative for  apnea, chest tightness and wheezing.    Cardiovascular: Positive for chest pain. Negative for leg swelling.   Gastrointestinal: Negative for abdominal distention, constipation, nausea and vomiting.   Genitourinary: Negative for flank pain and hematuria.   Musculoskeletal: Positive for arthralgias (chronic).   Neurological: Positive for dizziness (chronic episodic). Negative for light-headedness and numbness.   Psychiatric/Behavioral: Negative for agitation and confusion. The patient is not nervous/anxious.      Objective:     Vital Signs (Most Recent):  Temp: 97.4 °F (36.3 °C) (07/26/19 1012)  Pulse: 65 (07/26/19 0946)  Resp: (!) 24 (07/26/19 0946)  BP: 112/62 (07/26/19 0946)  SpO2: 97 % (07/26/19 0946) Vital Signs (24h Range):  Temp:  [97.4 °F (36.3 °C)-97.9 °F (36.6 °C)] 97.4 °F (36.3 °C)  Pulse:  [44-77] 65  Resp:  [16-30] 24  SpO2:  [95 %-98 %] 97 %  BP: ()/(51-82) 112/62     Weight: 94.3 kg (208 lb)  Body mass index is 35.7 kg/m².    Physical Exam   Constitutional: She is oriented to person, place, and time. She appears well-developed and well-nourished.   HENT:   Head: Normocephalic and atraumatic.   Eyes: EOM are normal.   Neck: Normal range of motion. Neck supple. No JVD present.   Cardiovascular: Normal rate and regular rhythm.   Pulmonary/Chest: Effort normal and breath sounds normal.   Abdominal: Soft.   Musculoskeletal: Normal range of motion. She exhibits no edema or deformity.   Neurological: She is alert and oriented to person, place, and time.   Skin: Skin is warm and dry.   Psychiatric: She has a normal mood and affect. Her behavior is normal. Judgment and thought content normal.   Nursing note and vitals reviewed.        CRANIAL NERVES     CN III, IV, VI   Extraocular motions are normal.        Significant Labs: .  Recent Labs   Lab 07/26/19  0504   WBC 5.77   HGB 12.3   HCT 37.2        Recent Labs   Lab 07/26/19  0504      K 3.9      CO2 23   BUN 14   CREATININE 0.8   GLU  167*   CALCIUM 9.7     Recent Labs   Lab 07/26/19  0504   ALKPHOS 84   ALT 16   AST 15   ALBUMIN 3.4*   PROT 7.2   BILITOT 0.4      Recent Labs     07/26/19  0504   TROPONINI 0.008     Recent Labs   Lab 07/26/19  1020   POCTGLUCOSE 205*     No results found for: HGBA1C  Scheduled Meds:  Continuous Infusions:  As Needed:     Significant Imaging: possible infiltrate on right chest area

## 2019-07-26 NOTE — HOSPITAL COURSE
She received fluids and IV Levaquin in the ED. CXR---Pulmonary infiltrate at the right lung base with appearance suggesting developing confluence. The patient feels much better this morning. Will d/c the patient to home with a RX for Levaquin 500 mg PO x5 days. Will also add albuterol inhaler and tessalon pearls as needed. The patient will need to follow up with her PCP in 1 week.

## 2019-07-26 NOTE — ED NOTES
"Pt resting comfortably at this time. Talking with son at bedside. Resp even and unlabored. Pt states "chest pain much, much better. It feels like a soreness"; pt rates 6 on 1/10 pain scale. Pt updated with plan of care. Verbalized understanding.  "

## 2019-07-26 NOTE — ED NOTES
"Patient ambulated to restroom with assistance, states she thinks her "blood sugar" has dropped, will notify "

## 2019-07-26 NOTE — ED NOTES
Notified Dr. De Santiago of pt BP and hr. New verbal order to hold Calcium Gluconate at this time.

## 2019-07-26 NOTE — HPI
"This is a 63 y.o. female who presents with complaint of R sided CP and SOB that began 30 minutes ago. CP is described as "constant pressure;" SOB is exacerbated with deep breathing. Her associated symptoms include R arm pain, and her BP was 154/98. The patient denies any other symptoms. Home therapy includes taking 10 mg amlodipine, which is 2x her daily dose, and 300 mg of irbesartan. Upon exam, her BP is significantly lowered as well as her HR, and she reports improvement in her pain. Her PMHx includes anxiety attacks, HTN, DM, HLD, and GERD. No other palliative or provocative factors except as noted.  "

## 2019-07-26 NOTE — PROGRESS NOTES
Pharmacist Renal Dose Adjustment Note    Margarita Orourke is a 63 y.o. female being treated with the medication enoxaparin    Patient Data:    Vital Signs (Most Recent):  Temp: 97.9 °F (36.6 °C) (07/26/19 1203)  Pulse: 72 (07/26/19 1203)  Resp: 18 (07/26/19 1203)  BP: 136/79 (07/26/19 1203)  SpO2: 97 % (07/26/19 1203) Vital Signs (72h Range):  Temp:  [97.4 °F (36.3 °C)-97.9 °F (36.6 °C)]   Pulse:  [44-77]   Resp:  [16-30]   BP: ()/(51-82)   SpO2:  [95 %-98 %]      Recent Labs   Lab 07/26/19  0504   CREATININE 0.8     Serum creatinine: 0.8 mg/dL 07/26/19 0504  Estimated creatinine clearance: 80.1 mL/min    Medication:enoxaparin dose: 30 mg frequency Q24h will be changed to medication: enoxaparin dose:40 mg frequency:q24h    Pharmacist's Name: Elzbieta Wetzel  Pharmacist's Extension: [phone]

## 2019-07-26 NOTE — ED NOTES
Patient alert and oriented, no s/s of acute distress noted, pillow and fresh blanket provided, son at bedside, call light within reach, will continue to monitor

## 2019-07-26 NOTE — H&P
"[facility] Medicine  History & Physical    Patient Name: Margarita Orourke  MRN: [id number]  Admission Date: 7/26/2019  Attending Physician: Lyndon Newsome DO  Primary Care Provider: Shivam Paredes MD         Patient information was obtained from patient, relative(s) and ER records.     Subjective:     Principal Problem:<principal problem not specified>    Chief Complaint:   Chief Complaint   Patient presents with    Chest Pain     Patient complains of chest pain accompanied by R arm pain that started 30 minutes ago PTA " constant pressure." accompanied by SOB.         HPI: This is a 63 y.o. female who presents with complaint of R sided CP and SOB that began 30 minutes ago. CP is described as "constant pressure;" SOB is exacerbated with deep breathing. Her associated symptoms include R arm pain, and her BP was 154/98. The patient denies any other symptoms. Home therapy includes taking 10 mg amlodipine, which is 2x her daily dose, and 300 mg of irbesartan. Upon exam, her BP is significantly lowered as well as her HR, and she reports improvement in her pain. Her PMHx includes anxiety attacks, HTN, DM, HLD, and GERD. No other palliative or provocative factors except as noted.    Past Medical History:   Diagnosis Date    Anxiety     Arthritis     Asthma     Back pain     Depression     Diabetes mellitus     Eczema     GERD (gastroesophageal reflux disease)     Hepatitis B     Hyperlipidemia     Hypertension     Seizures        No past surgical history on file.    Review of patient's allergies indicates:   Allergen Reactions    Zantac [ranitidine hcl] Nausea And Vomiting    Penicillins     Phenergan [promethazine] Nausea Only       No current facility-administered medications on file prior to encounter.      Current Outpatient Medications on File Prior to Encounter   Medication Sig    ALPRAZolam (XANAX) 0.25 MG tablet Take 0.25 mg by mouth 3 (three) times daily.    " amLODIPine (NORVASC) 5 MG tablet Take 1 tablet (5 mg total) by mouth every morning.    budesonide-formoterol 160-4.5 mcg (SYMBICORT) 160-4.5 mcg/actuation HFAA Inhale 2 puffs into the lungs every 12 (twelve) hours. Controller    escitalopram oxalate (LEXAPRO) 5 MG Tab Take 5 mg by mouth once daily.    folic acid (FOLVITE) 1 MG tablet Take 1 mg by mouth once daily.    hydroxychloroquine (PLAQUENIL) 200 mg tablet Take 200 mg by mouth once daily.    ibuprofen (ADVIL,MOTRIN) 600 MG tablet Take 1 tablet (600 mg total) by mouth every 6 (six) hours as needed for Pain.    insulin glargine (LANTUS) 100 unit/mL injection Inject into the skin every evening.    irbesartan (AVAPRO) 300 MG tablet Take 300 mg by mouth every evening.    omeprazole (PRILOSEC) 20 MG capsule Take 20 mg by mouth once daily.     Family History     None        Tobacco Use    Smoking status: Current Every Day Smoker     Packs/day: 0.50   Substance and Sexual Activity    Alcohol use: No    Drug use: No    Sexual activity: Not on file     Review of Systems   Constitutional: Positive for chills and fatigue. Negative for activity change and fever.   HENT: Negative for congestion and nosebleeds.    Respiratory: Positive for cough. Negative for apnea, chest tightness and wheezing.    Cardiovascular: Positive for chest pain. Negative for leg swelling.   Gastrointestinal: Negative for abdominal distention, constipation, nausea and vomiting.   Genitourinary: Negative for flank pain and hematuria.   Musculoskeletal: Positive for arthralgias (chronic).   Neurological: Positive for dizziness (chronic episodic). Negative for light-headedness and numbness.   Psychiatric/Behavioral: Negative for agitation and confusion. The patient is not nervous/anxious.      Objective:     Vital Signs (Most Recent):  Temp: 97.4 °F (36.3 °C) (07/26/19 1012)  Pulse: 65 (07/26/19 0946)  Resp: (!) 24 (07/26/19 0946)  BP: 112/62 (07/26/19 0946)  SpO2: 97 % (07/26/19 0946) Vital  Signs (24h Range):  Temp:  [97.4 °F (36.3 °C)-97.9 °F (36.6 °C)] 97.4 °F (36.3 °C)  Pulse:  [44-77] 65  Resp:  [16-30] 24  SpO2:  [95 %-98 %] 97 %  BP: ()/(51-82) 112/62     Weight: 94.3 kg (208 lb)  Body mass index is 35.7 kg/m².    Physical Exam   Constitutional: She is oriented to person, place, and time. She appears well-developed and well-nourished.   HENT:   Head: Normocephalic and atraumatic.   Eyes: EOM are normal.   Neck: Normal range of motion. Neck supple. No JVD present.   Cardiovascular: Normal rate and regular rhythm.   Pulmonary/Chest: Effort normal and breath sounds normal.   Abdominal: Soft.   Musculoskeletal: Normal range of motion. She exhibits no edema or deformity.   Neurological: She is alert and oriented to person, place, and time.   Skin: Skin is warm and dry.   Psychiatric: She has a normal mood and affect. Her behavior is normal. Judgment and thought content normal.   Nursing note and vitals reviewed.        CRANIAL NERVES     CN III, IV, VI   Extraocular motions are normal.        Significant Labs: .  Recent Labs   Lab 07/26/19  0504   WBC 5.77   HGB 12.3   HCT 37.2        Recent Labs   Lab 07/26/19  0504      K 3.9      CO2 23   BUN 14   CREATININE 0.8   *   CALCIUM 9.7     Recent Labs   Lab 07/26/19  0504   ALKPHOS 84   ALT 16   AST 15   ALBUMIN 3.4*   PROT 7.2   BILITOT 0.4      Recent Labs     07/26/19  0504   TROPONINI 0.008     Recent Labs   Lab 07/26/19  1020   POCTGLUCOSE 205*     No results found for: HGBA1C  Scheduled Meds:  Continuous Infusions:  As Needed:     Significant Imaging: possible infiltrate on right chest area    Assessment/Plan:     Pneumonia due to infectious organism  lovoquin  IVF  monitor      Abnormal CXR  tx PNA  2 d echo for CHF      Hypotension due to drugs  Hold BP meds  monitor      CHF (congestive heart failure)  Check 2 d echo      Chest pain  Possible form PNA  Had neg lhc as per pt recently  Check trop        VTE Risk  Mitigation (From admission, onward)    None             Lyndon Newsome DO  Department of Hospital Medicine   [facility]

## 2019-07-27 VITALS
OXYGEN SATURATION: 98 % | WEIGHT: 189.63 LBS | HEART RATE: 83 BPM | HEIGHT: 64 IN | BODY MASS INDEX: 32.37 KG/M2 | SYSTOLIC BLOOD PRESSURE: 123 MMHG | RESPIRATION RATE: 18 BRPM | DIASTOLIC BLOOD PRESSURE: 78 MMHG | TEMPERATURE: 98 F

## 2019-07-27 PROBLEM — R07.9 CHEST PAIN: Status: RESOLVED | Noted: 2019-07-26 | Resolved: 2019-07-27

## 2019-07-27 PROBLEM — I95.2 HYPOTENSION DUE TO DRUGS: Status: RESOLVED | Noted: 2019-07-26 | Resolved: 2019-07-27

## 2019-07-27 LAB
ANION GAP SERPL CALC-SCNC: 9 MMOL/L (ref 8–16)
BASOPHILS # BLD AUTO: 0.03 K/UL (ref 0–0.2)
BASOPHILS NFR BLD: 0.4 % (ref 0–1.9)
BUN SERPL-MCNC: 12 MG/DL (ref 8–23)
CALCIUM SERPL-MCNC: 9.4 MG/DL (ref 8.7–10.5)
CHLORIDE SERPL-SCNC: 108 MMOL/L (ref 95–110)
CO2 SERPL-SCNC: 23 MMOL/L (ref 23–29)
CREAT SERPL-MCNC: 0.7 MG/DL (ref 0.5–1.4)
DIFFERENTIAL METHOD: ABNORMAL
EOSINOPHIL # BLD AUTO: 0.2 K/UL (ref 0–0.5)
EOSINOPHIL NFR BLD: 3.3 % (ref 0–8)
ERYTHROCYTE [DISTWIDTH] IN BLOOD BY AUTOMATED COUNT: 14 % (ref 11.5–14.5)
EST. GFR  (AFRICAN AMERICAN): >60 ML/MIN/1.73 M^2
EST. GFR  (NON AFRICAN AMERICAN): >60 ML/MIN/1.73 M^2
GLUCOSE SERPL-MCNC: 170 MG/DL (ref 70–110)
HCT VFR BLD AUTO: 35.4 % (ref 37–48.5)
HGB BLD-MCNC: 11.5 G/DL (ref 12–16)
LYMPHOCYTES # BLD AUTO: 1.9 K/UL (ref 1–4.8)
LYMPHOCYTES NFR BLD: 26.2 % (ref 18–48)
MAGNESIUM SERPL-MCNC: 1.7 MG/DL (ref 1.6–2.6)
MCH RBC QN AUTO: 30.7 PG (ref 27–31)
MCHC RBC AUTO-ENTMCNC: 32.5 G/DL (ref 32–36)
MCV RBC AUTO: 94 FL (ref 82–98)
MONOCYTES # BLD AUTO: 0.8 K/UL (ref 0.3–1)
MONOCYTES NFR BLD: 11 % (ref 4–15)
NEUTROPHILS # BLD AUTO: 4.2 K/UL (ref 1.8–7.7)
NEUTROPHILS NFR BLD: 59.1 % (ref 38–73)
PHOSPHATE SERPL-MCNC: 3 MG/DL (ref 2.7–4.5)
PLATELET # BLD AUTO: 196 K/UL (ref 150–350)
PMV BLD AUTO: 9.6 FL (ref 9.2–12.9)
POCT GLUCOSE: 168 MG/DL (ref 70–110)
POCT GLUCOSE: 173 MG/DL (ref 70–110)
POCT GLUCOSE: 183 MG/DL (ref 70–110)
POTASSIUM SERPL-SCNC: 3.9 MMOL/L (ref 3.5–5.1)
RBC # BLD AUTO: 3.75 M/UL (ref 4–5.4)
SODIUM SERPL-SCNC: 140 MMOL/L (ref 136–145)
WBC # BLD AUTO: 7.07 K/UL (ref 3.9–12.7)

## 2019-07-27 PROCEDURE — 85025 COMPLETE CBC W/AUTO DIFF WBC: CPT

## 2019-07-27 PROCEDURE — 96361 HYDRATE IV INFUSION ADD-ON: CPT

## 2019-07-27 PROCEDURE — 80048 BASIC METABOLIC PNL TOTAL CA: CPT

## 2019-07-27 PROCEDURE — 94640 AIRWAY INHALATION TREATMENT: CPT

## 2019-07-27 PROCEDURE — 25000003 PHARM REV CODE 250: Performed by: HOSPITALIST

## 2019-07-27 PROCEDURE — G0378 HOSPITAL OBSERVATION PER HR: HCPCS

## 2019-07-27 PROCEDURE — 25000242 PHARM REV CODE 250 ALT 637 W/ HCPCS: Performed by: HOSPITALIST

## 2019-07-27 PROCEDURE — 36415 COLL VENOUS BLD VENIPUNCTURE: CPT

## 2019-07-27 PROCEDURE — 84100 ASSAY OF PHOSPHORUS: CPT

## 2019-07-27 PROCEDURE — 94761 N-INVAS EAR/PLS OXIMETRY MLT: CPT

## 2019-07-27 PROCEDURE — 83735 ASSAY OF MAGNESIUM: CPT

## 2019-07-27 RX ORDER — BENZONATATE 200 MG/1
200 CAPSULE ORAL 3 TIMES DAILY PRN
Qty: 30 CAPSULE | Refills: 0 | Status: SHIPPED | OUTPATIENT
Start: 2019-07-27 | End: 2019-08-06

## 2019-07-27 RX ORDER — ALBUTEROL SULFATE 90 UG/1
2 AEROSOL, METERED RESPIRATORY (INHALATION) EVERY 6 HOURS PRN
Qty: 18 G | Refills: 0 | Status: SHIPPED | OUTPATIENT
Start: 2019-07-27 | End: 2019-08-24 | Stop reason: SDUPTHER

## 2019-07-27 RX ORDER — LEVOFLOXACIN 750 MG/1
750 TABLET ORAL DAILY
Qty: 5 TABLET | Refills: 0 | Status: SHIPPED | OUTPATIENT
Start: 2019-07-27 | End: 2019-08-01

## 2019-07-27 RX ADMIN — ESCITALOPRAM OXALATE 5 MG: 5 TABLET, FILM COATED ORAL at 09:07

## 2019-07-27 RX ADMIN — ALPRAZOLAM 0.25 MG: 0.25 TABLET ORAL at 09:07

## 2019-07-27 RX ADMIN — FOLIC ACID 1 MG: 1 TABLET ORAL at 09:07

## 2019-07-27 RX ADMIN — PANTOPRAZOLE SODIUM 40 MG: 40 TABLET, DELAYED RELEASE ORAL at 09:07

## 2019-07-27 RX ADMIN — FLUTICASONE FUROATE AND VILANTEROL TRIFENATATE 1 PUFF: 100; 25 POWDER RESPIRATORY (INHALATION) at 08:07

## 2019-07-27 NOTE — NURSING
Updated POC reviewed with patient. Patient is AAOx4, on continuous cardiac monitoring. NAD noted overnight. Blood glucose monitored. IV fluids going at 75 mL/hr. Fall precautions explained and maintained. Bed locked and in lowest position. Advised patient to use call light for assist, patient verbalized complete understanding. Will continue to monitor.

## 2019-07-27 NOTE — DISCHARGE SUMMARY
"[facility] Medicine  Discharge Summary      Patient Name: Margarita Orourke  MRN: [id number]  Admission Date: 7/26/2019  Hospital Length of Stay: 0 days  Discharge Date and Time:  07/27/2019 12:39 PM  Attending Physician: Lyndon Newsome DO   Discharging Provider: Bertrand Champagne NP  Primary Care Provider: Shivam Paredes MD      HPI:   This is a 63 y.o. female who presents with complaint of R sided CP and SOB that began 30 minutes ago. CP is described as "constant pressure;" SOB is exacerbated with deep breathing. Her associated symptoms include R arm pain, and her BP was 154/98. The patient denies any other symptoms. Home therapy includes taking 10 mg amlodipine, which is 2x her daily dose, and 300 mg of irbesartan. Upon exam, her BP is significantly lowered as well as her HR, and she reports improvement in her pain. Her PMHx includes anxiety attacks, HTN, DM, HLD, and GERD. No other palliative or provocative factors except as noted.    * No surgery found *      Hospital Course:   She received fluids and IV Levaquin in the ED. CXR---Pulmonary infiltrate at the right lung base with appearance suggesting developing confluence. The patient feels much better this morning. Will d/c the patient to home with a RX for Levaquin 500 mg PO x5 days. Will also add albuterol inhaler and tessalon pearls as needed. The patient will need to follow up with her PCP in 1 week.          Consults:     * Pneumonia due to infectious organism  Levaquin given  IVF given      CHF (congestive heart failure)  Checked TTE    Abnormal CXR  -will cont to treat pneumonia with Levaquin 500 mg PO x5 days  -TTE---  Conclusion     · Normal left ventricular systolic function. The estimated ejection fraction is 65%  · Normal LV diastolic function.  · Concentric left ventricular remodeling.  · Normal right ventricular systolic function.  · Mild aortic valve stenosis.  · Aortic valve area is 1.56 cm2; peak velocity " is 2.10 m/s; mean gradient is 10 mmHg.  · Normal central venous pressure (3 mm Hg).  · The estimated PA systolic pressure is 32 mm Hg     -will need follow up outpatient with PCP      Chest pain-resolved as of 7/27/2019  -Likely related to the pneumonia, no complaints noted at this time.  -Per the patient she has recently had a cardiac workup that was negative.  -Trended troponin---negative x3  -cardiac tele monitoring      Hypotension due to drugs-resolved as of 7/27/2019  Hold BP meds  monitor        Final Active Diagnoses:    Diagnosis Date Noted POA    PRINCIPAL PROBLEM:  Pneumonia due to infectious organism [J18.9] 07/26/2019 Yes    Abnormal CXR [R93.89] 07/26/2019 Yes    History of hypertension [Z86.79] 07/26/2019 Not Applicable    CHF (congestive heart failure) [I50.9] 07/26/2019 Yes      Problems Resolved During this Admission:    Diagnosis Date Noted Date Resolved POA    Hypotension due to drugs [I95.2] 07/26/2019 07/27/2019 Yes    Chest pain [R07.9] 07/26/2019 07/27/2019 Yes       Discharged Condition: stable    Disposition: Home or Self Care    Follow Up:  Follow-up Information     Shivam Paredes MD In 1 week.    Specialty:  Internal Medicine  Why:  hospital follow up / Offices closed for Weekend. Patient to schedule own follow up appointment.  Contact information:  [address] [place] [address] [phone]                 Patient Instructions:      Diet Cardiac     Notify your health care provider if you experience any of the following:  temperature >100.4     Notify your health care provider if you experience any of the following:  persistent nausea and vomiting or diarrhea     Notify your health care provider if you experience any of the following:  severe uncontrolled pain     Notify your health care provider if you experience any of the following:  difficulty breathing or increased cough     Notify your health care provider if you experience any of the following:  severe persistent  headache     Notify your health care provider if you experience any of the following:  worsening rash     Notify your health care provider if you experience any of the following:  persistent dizziness, light-headedness, or visual disturbances     Notify your health care provider if you experience any of the following:  increased confusion or weakness     Activity as tolerated       Significant Diagnostic Studies: Labs:   BMP:   Recent Labs   Lab 07/26/19  0504 07/27/19  0533   * 170*    140   K 3.9 3.9    108   CO2 23 23   BUN 14 12   CREATININE 0.8 0.7   CALCIUM 9.7 9.4   MG  --  1.7    and CBC   Recent Labs   Lab 07/26/19  0504 07/27/19  0533   WBC 5.77 7.07   HGB 12.3 11.5*   HCT 37.2 35.4*    196       Pending Diagnostic Studies:     None         Medications:  Reconciled Home Medications:      Medication List      START taking these medications    albuterol 90 mcg/actuation inhaler  Commonly known as:  VENTOLIN HFA  Inhale 2 puffs into the lungs every 6 (six) hours as needed for Wheezing. Rescue     benzonatate 200 MG capsule  Commonly known as:  TESSALON  Take 1 capsule (200 mg total) by mouth 3 (three) times daily as needed for Cough.     levoFLOXacin 750 MG tablet  Commonly known as:  LEVAQUIN  Take 1 tablet (750 mg total) by mouth once daily. for 5 days        CONTINUE taking these medications    ALPRAZolam 0.25 MG tablet  Commonly known as:  XANAX  Take 0.25 mg by mouth 3 (three) times daily.     amLODIPine 5 MG tablet  Commonly known as:  NORVASC  Take 1 tablet (5 mg total) by mouth every morning.     budesonide-formoterol 160-4.5 mcg 160-4.5 mcg/actuation Hfaa  Commonly known as:  SYMBICORT  Inhale 2 puffs into the lungs every 12 (twelve) hours. Controller     escitalopram oxalate 5 MG Tab  Commonly known as:  LEXAPRO  Take 5 mg by mouth once daily.     folic acid 1 MG tablet  Commonly known as:  FOLVITE  Take 1 mg by mouth once daily.     hydroxychloroquine 200 mg  tablet  Commonly known as:  PLAQUENIL  Take 200 mg by mouth once daily.     ibuprofen 600 MG tablet  Commonly known as:  ADVIL,MOTRIN  Take 1 tablet (600 mg total) by mouth every 6 (six) hours as needed for Pain.     insulin glargine 100 unit/mL injection  Commonly known as:  LANTUS  Inject into the skin every evening.     irbesartan 300 MG tablet  Commonly known as:  AVAPRO  Take 300 mg by mouth every evening.     omeprazole 20 MG capsule  Commonly known as:  PRILOSEC  Take 20 mg by mouth once daily.            Indwelling Lines/Drains at time of discharge:   Lines/Drains/Airways          None          Time spent on the discharge of patient: 35 minutes  Patient was seen and examined on the date of discharge and determined to be suitable for discharge.         Bertrand Champagne NP  Department of Hospital Medicine  [facility]

## 2019-07-27 NOTE — DISCHARGE INSTRUCTIONS
Pneumonia, Preventing (English) View Edit Remove   Pneumonia, Treating (English) View Edit Remove   Pneumonia, What Is (English) View Edit Remove   Albuterol inhalation aerosol (English) View Edit Remove   Benzonatate capsules (English) View Edit Remove   Levofloxacin tablets (English) View Edit Remove

## 2019-07-27 NOTE — ASSESSMENT & PLAN NOTE
-Likely related to the pneumonia, no complaints noted at this time.  -Per the patient she has recently had a cardiac workup that was negative.  -Trended troponin---negative x3  -cardiac tele monitoring

## 2019-07-27 NOTE — PLAN OF CARE
Problem: Adult Inpatient Plan of Care  Goal: Plan of Care Review  Outcome: Ongoing (interventions implemented as appropriate)  Patient safety maintained throughout this shift, IV, tele monitor active, accu check ac & HS, pt is being discharge to home, IV out, tele monitor off, pt verbalized understanding of discharge instructions, follow up visits and prescriptions, pt in stable condition

## 2019-07-27 NOTE — PLAN OF CARE
Discharge orders noted, no HH or HME ordered.    Pt's nurse will go over medications/signs and symptoms prior to discharge       07/27/19 1001   Final Note   Assessment Type Final Discharge Note   Anticipated Discharge Disposition Home   What phone number can be called within the next 1-3 days to see how you are doing after discharge? [phone]   Hospital Follow Up  Appt(s) scheduled? No  (Offices closed for Weekend. Patient to schedule own follow up appointment.)   Right Care Referral Info   Post Acute Recommendation No Care     Sophia Puga RN Transitional Navigator  [phone]

## 2019-07-27 NOTE — ASSESSMENT & PLAN NOTE
-will cont to treat pneumonia with Levaquin 500 mg PO x5 days  -TTE---  Conclusion     · Normal left ventricular systolic function. The estimated ejection fraction is 65%  · Normal LV diastolic function.  · Concentric left ventricular remodeling.  · Normal right ventricular systolic function.  · Mild aortic valve stenosis.  · Aortic valve area is 1.56 cm2; peak velocity is 2.10 m/s; mean gradient is 10 mmHg.  · Normal central venous pressure (3 mm Hg).  · The estimated PA systolic pressure is 32 mm Hg     -will need follow up outpatient with PCP

## 2019-07-31 LAB
BACTERIA BLD CULT: NORMAL
BACTERIA BLD CULT: NORMAL

## 2019-08-03 ENCOUNTER — HOSPITAL ENCOUNTER (EMERGENCY)
Facility: HOSPITAL | Age: 64
Discharge: HOME | End: 2019-08-03
Attending: EMERGENCY MEDICINE
Payer: MEDICARE

## 2019-08-03 VITALS
TEMPERATURE: 98 F | BODY MASS INDEX: 33.32 KG/M2 | RESPIRATION RATE: 16 BRPM | HEART RATE: 71 BPM | WEIGHT: 200 LBS | HEIGHT: 65 IN | DIASTOLIC BLOOD PRESSURE: 69 MMHG | SYSTOLIC BLOOD PRESSURE: 132 MMHG | OXYGEN SATURATION: 99 %

## 2019-08-03 DIAGNOSIS — I10 HYPERTENSION, UNSPECIFIED TYPE: Primary | ICD-10-CM

## 2019-08-03 DIAGNOSIS — R00.2 PALPITATIONS: ICD-10-CM

## 2019-08-03 PROCEDURE — 93010 EKG 12-LEAD: ICD-10-PCS | Mod: ,,, | Performed by: INTERNAL MEDICINE

## 2019-08-03 PROCEDURE — 93005 ELECTROCARDIOGRAM TRACING: CPT

## 2019-08-03 PROCEDURE — 93010 ELECTROCARDIOGRAM REPORT: CPT | Mod: ,,, | Performed by: INTERNAL MEDICINE

## 2019-08-03 PROCEDURE — 99283 EMERGENCY DEPT VISIT LOW MDM: CPT

## 2019-08-03 NOTE — ED PROVIDER NOTES
"Encounter Date: 8/3/2019    SCRIBE #1 NOTE: I, Adeline Vasquez, am scribing for, and in the presence of,  Dr. Lefort . I have scribed the entire note.       History     Chief Complaint   Patient presents with    Palpitations     Patient states she woke up out of her sleep having "fast heart beat with some chest discomfort; it felt like when I have panic attacks". Patient also states her BP at home was 177 /107. PAtient denies CP, SOB, or palpitations at this time they are resolved     Time seen by provider: 4:44 AM    This is a 64 y.o. female who presents via EMS with complaint of  Palpitations that occurred PTA. Pt reports she abruptly woke up from her sleep tonight due to her heart "racing" and notes she believed she had a panic attack. She states her BP was 177/107 but has lowered since incident. Pt was discharged on 7/26/2019, at which time she was diagnosed with Pneumonia. Pt has a Hx of CHF, Anxiety and Diabetes.          The history is provided by the patient.     Review of patient's allergies indicates:   Allergen Reactions    Zantac [ranitidine hcl] Nausea And Vomiting    Penicillins     Phenergan [promethazine] Nausea Only     Past Medical History:   Diagnosis Date    Anxiety     Arthritis     Asthma     Back pain     Depression     Diabetes mellitus     Eczema     GERD (gastroesophageal reflux disease)     Hepatitis B     Hyperlipidemia     Hypertension     Seizures      No past surgical history on file.  No family history on file.  Social History     Tobacco Use    Smoking status: Current Every Day Smoker     Packs/day: 0.50   Substance Use Topics    Alcohol use: No    Drug use: No     Review of Systems   Constitutional: Negative for chills and fever.   HENT: Negative for congestion.    Respiratory: Negative for cough and shortness of breath.    Cardiovascular: Positive for palpitations. Negative for chest pain.   Gastrointestinal: Negative for abdominal pain, diarrhea, nausea and " vomiting.   Musculoskeletal: Negative for back pain.       Physical Exam     Initial Vitals [08/03/19 0425]   BP Pulse Resp Temp SpO2   (!) 143/84 80 14 98.4 °F (36.9 °C) 100 %      MAP       --         Physical Exam    Nursing note and vitals reviewed.  Constitutional: She appears well-developed and well-nourished. She is not diaphoretic. No distress.   HENT:   Head: Normocephalic and atraumatic.   Right Ear: External ear normal.   Left Ear: External ear normal.   Nose: Nose normal.   Eyes: Conjunctivae and EOM are normal. Pupils are equal, round, and reactive to light.   Neck: Normal range of motion. Neck supple.   Cardiovascular: Normal rate, regular rhythm and normal heart sounds.   No murmur heard.  Pulmonary/Chest: Breath sounds normal. No respiratory distress. She has no wheezes.   Abdominal: Soft. She exhibits no distension. There is no tenderness.   Musculoskeletal: Normal range of motion. She exhibits no edema or tenderness.   Trace pitting edema bilaterally   Skin: Skin is warm and dry.         ED Course   Procedures  Labs Reviewed - No data to display  EKG Readings: (Independently Interpreted)   NSR; rate of 75 bpm; no STEMI; normal conduction           Medical Decision Making:   Differential Diagnosis:   Arrhythmia PE heart failure MI sleep apnea panic attack  Independently Interpreted Test(s):   I have ordered and independently interpreted EKG Reading(s) - see prior notes  Clinical Tests:   Medical Tests: Ordered and Reviewed  ED Management:  Patient presents after presenting from sleep with symptoms of a panic attack.  She checked her blood pressure which was elevated and felt concern and immediately presented to emergency department. These were short-lived and did not persist upon presentation to the emergency department.  She reported symptomatic palpitations associated with this however had no chest pain shortness of breath. EKG is normal with no events on the monitor.  Patient is a history of  recurrent panic attacks.  Reassurance provided.  Do not suspect emergent pathology.  Discussed further evaluation in the emergency department, though felt to be low yield given the patient's history and exam, patient would like to be discharged home with follow-up.  We discussed indications for ED return.                      Clinical Impression:       ICD-10-CM ICD-9-CM   1. Hypertension, unspecified type I10 401.9   2. Palpitations R00.2 785.1       Scribe Attestation I, Dr. Guy Lefort, personally performed the services described in this documentation. All medical record entries made by the scribe were at my direction and in my presence. I have reviewed the chart and agree that the record reflects my personal performance and is accurate and complete. Guy Lefort, MD.  2:13 AM 08/05/2019      Disposition:   Disposition: Discharged  Condition: Stable                        Guy J. Lefort, MD  08/05/19 0216

## 2019-08-28 RX ORDER — ALBUTEROL SULFATE 90 UG/1
AEROSOL, METERED RESPIRATORY (INHALATION)
Qty: 18 G | Refills: 0 | Status: SHIPPED | OUTPATIENT
Start: 2019-08-28 | End: 2020-03-16 | Stop reason: SDUPTHER

## 2019-09-06 ENCOUNTER — TELEPHONE (OUTPATIENT)
Dept: FAMILY MEDICINE | Facility: CLINIC | Age: 64
End: 2019-09-06

## 2019-09-06 RX ORDER — AZITHROMYCIN 250 MG/1
TABLET, FILM COATED ORAL
Qty: 6 TABLET | Refills: 0 | Status: SHIPPED | OUTPATIENT
Start: 2019-09-06 | End: 2019-09-11

## 2019-09-06 NOTE — TELEPHONE ENCOUNTER
----- Message from Ha Bob sent at 9/6/2019  3:37 PM CDT -----  Contact: [phone] or [phone]/self  Patient would like to know what she can take for sinus issues  Please call back to assist at [phone] or [phone]

## 2019-10-07 ENCOUNTER — TELEPHONE (OUTPATIENT)
Dept: FAMILY MEDICINE | Facility: CLINIC | Age: 64
End: 2019-10-07

## 2019-10-07 DIAGNOSIS — E11.9 TYPE 2 DIABETES MELLITUS WITHOUT COMPLICATION, WITHOUT LONG-TERM CURRENT USE OF INSULIN: Primary | ICD-10-CM

## 2019-10-07 NOTE — TELEPHONE ENCOUNTER
Called and spoke lacie pt in regards of lab orders. Informed pt that lab orders were sen to [facility], and that she has to fast to get her labs done. Patient verbalized understanding of message.

## 2019-10-07 NOTE — TELEPHONE ENCOUNTER
----- Message from Allison Vasquez sent at 10/7/2019  1:38 PM CDT -----  Contact: [phone]/ Self   Patient would like a to speak to you in regards to blood work orders that were supposed to be put in. Please call.

## 2019-10-09 LAB
ALBUMIN SERPL-MCNC: 4.1 G/DL (ref 3.6–5.1)
ALBUMIN/GLOB SERPL: 1.3 (CALC) (ref 1–2.5)
ALP SERPL-CCNC: 87 U/L (ref 33–130)
ALT SERPL-CCNC: 13 U/L (ref 6–29)
AST SERPL-CCNC: 14 U/L (ref 10–35)
BASOPHILS # BLD AUTO: 50 CELLS/UL (ref 0–200)
BASOPHILS NFR BLD AUTO: 0.9 %
BILIRUB SERPL-MCNC: 0.5 MG/DL (ref 0.2–1.2)
BUN SERPL-MCNC: 12 MG/DL (ref 7–25)
BUN/CREAT SERPL: ABNORMAL (CALC) (ref 6–22)
CALCIUM SERPL-MCNC: 9.6 MG/DL (ref 8.6–10.4)
CHLORIDE SERPL-SCNC: 105 MMOL/L (ref 98–110)
CHOLEST SERPL-MCNC: 171 MG/DL
CHOLEST/HDLC SERPL: 3.9 (CALC)
CO2 SERPL-SCNC: 24 MMOL/L (ref 20–32)
CREAT SERPL-MCNC: 0.62 MG/DL (ref 0.5–0.99)
EOSINOPHIL # BLD AUTO: 112 CELLS/UL (ref 15–500)
EOSINOPHIL NFR BLD AUTO: 2 %
ERYTHROCYTE [DISTWIDTH] IN BLOOD BY AUTOMATED COUNT: 13.4 % (ref 11–15)
GFRSERPLBLD MDRD-ARVRAT: 95 ML/MIN/1.73M2
GLOBULIN SER CALC-MCNC: 3.1 G/DL (CALC) (ref 1.9–3.7)
GLUCOSE SERPL-MCNC: 128 MG/DL (ref 65–99)
HBA1C MFR BLD: 7.4 % OF TOTAL HGB
HCT VFR BLD AUTO: 41.7 % (ref 35–45)
HDLC SERPL-MCNC: 44 MG/DL
HGB BLD-MCNC: 14.1 G/DL (ref 11.7–15.5)
LDLC SERPL CALC-MCNC: 115 MG/DL (CALC)
LYMPHOCYTES # BLD AUTO: 2274 CELLS/UL (ref 850–3900)
LYMPHOCYTES NFR BLD AUTO: 40.6 %
MCH RBC QN AUTO: 30.5 PG (ref 27–33)
MCHC RBC AUTO-ENTMCNC: 33.8 G/DL (ref 32–36)
MCV RBC AUTO: 90.1 FL (ref 80–100)
MONOCYTES # BLD AUTO: 342 CELLS/UL (ref 200–950)
MONOCYTES NFR BLD AUTO: 6.1 %
NEUTROPHILS # BLD AUTO: 2822 CELLS/UL (ref 1500–7800)
NEUTROPHILS NFR BLD AUTO: 50.4 %
NONHDLC SERPL-MCNC: 127 MG/DL (CALC)
PLATELET # BLD AUTO: 180 THOUSAND/UL (ref 140–400)
PMV BLD REES-ECKER: 11.2 FL (ref 7.5–12.5)
POTASSIUM SERPL-SCNC: 4 MMOL/L (ref 3.5–5.3)
PROT SERPL-MCNC: 7.2 G/DL (ref 6.1–8.1)
RBC # BLD AUTO: 4.63 MILLION/UL (ref 3.8–5.1)
SODIUM SERPL-SCNC: 139 MMOL/L (ref 135–146)
TRIGL SERPL-MCNC: 40 MG/DL
WBC # BLD AUTO: 5.6 THOUSAND/UL (ref 3.8–10.8)

## 2019-10-10 ENCOUNTER — OFFICE VISIT (OUTPATIENT)
Dept: FAMILY MEDICINE | Facility: CLINIC | Age: 64
End: 2019-10-10
Payer: MEDICARE

## 2019-10-10 VITALS
TEMPERATURE: 98 F | OXYGEN SATURATION: 99 % | SYSTOLIC BLOOD PRESSURE: 120 MMHG | HEIGHT: 65 IN | RESPIRATION RATE: 16 BRPM | BODY MASS INDEX: 35.13 KG/M2 | WEIGHT: 210.88 LBS | HEART RATE: 65 BPM | DIASTOLIC BLOOD PRESSURE: 84 MMHG

## 2019-10-10 DIAGNOSIS — M06.9 RHEUMATOID ARTHRITIS, INVOLVING UNSPECIFIED SITE, UNSPECIFIED RHEUMATOID FACTOR PRESENCE: ICD-10-CM

## 2019-10-10 DIAGNOSIS — I10 ESSENTIAL HYPERTENSION: ICD-10-CM

## 2019-10-10 DIAGNOSIS — Z12.31 ENCOUNTER FOR SCREENING MAMMOGRAM FOR BREAST CANCER: ICD-10-CM

## 2019-10-10 DIAGNOSIS — Z87.891 HISTORY OF TOBACCO ABUSE: ICD-10-CM

## 2019-10-10 DIAGNOSIS — F41.9 ANXIETY: ICD-10-CM

## 2019-10-10 DIAGNOSIS — E78.00 PURE HYPERCHOLESTEROLEMIA: ICD-10-CM

## 2019-10-10 DIAGNOSIS — E11.9 TYPE 2 DIABETES MELLITUS WITHOUT COMPLICATION, WITHOUT LONG-TERM CURRENT USE OF INSULIN: Primary | ICD-10-CM

## 2019-10-10 DIAGNOSIS — Z78.0 ASYMPTOMATIC POSTMENOPAUSAL STATE: ICD-10-CM

## 2019-10-10 DIAGNOSIS — J44.9 CHRONIC OBSTRUCTIVE PULMONARY DISEASE, UNSPECIFIED COPD TYPE: ICD-10-CM

## 2019-10-10 DIAGNOSIS — E55.9 VITAMIN D DEFICIENCY: ICD-10-CM

## 2019-10-10 PROCEDURE — 90686 IIV4 VACC NO PRSV 0.5 ML IM: CPT | Mod: HCNC,S$GLB,, | Performed by: INTERNAL MEDICINE

## 2019-10-10 PROCEDURE — 99499 RISK ADDL DX/OHS AUDIT: ICD-10-PCS | Mod: HCNC,S$GLB,, | Performed by: INTERNAL MEDICINE

## 2019-10-10 PROCEDURE — 3008F BODY MASS INDEX DOCD: CPT | Mod: HCNC,CPTII,S$GLB, | Performed by: INTERNAL MEDICINE

## 2019-10-10 PROCEDURE — 3008F PR BODY MASS INDEX (BMI) DOCUMENTED: ICD-10-PCS | Mod: HCNC,CPTII,S$GLB, | Performed by: INTERNAL MEDICINE

## 2019-10-10 PROCEDURE — 3079F PR MOST RECENT DIASTOLIC BLOOD PRESSURE 80-89 MM HG: ICD-10-PCS | Mod: HCNC,CPTII,S$GLB, | Performed by: INTERNAL MEDICINE

## 2019-10-10 PROCEDURE — 99499 UNLISTED E&M SERVICE: CPT | Mod: HCNC,S$GLB,, | Performed by: INTERNAL MEDICINE

## 2019-10-10 PROCEDURE — 99999 PR PBB SHADOW E&M-EST. PATIENT-LVL V: CPT | Mod: PBBFAC,HCNC,, | Performed by: INTERNAL MEDICINE

## 2019-10-10 PROCEDURE — 3074F PR MOST RECENT SYSTOLIC BLOOD PRESSURE < 130 MM HG: ICD-10-PCS | Mod: HCNC,CPTII,S$GLB, | Performed by: INTERNAL MEDICINE

## 2019-10-10 PROCEDURE — G0008 FLU VACCINE (QUAD) GREATER THAN OR EQUAL TO 3YO PRESERVATIVE FREE IM: ICD-10-PCS | Mod: HCNC,S$GLB,, | Performed by: INTERNAL MEDICINE

## 2019-10-10 PROCEDURE — 99214 OFFICE O/P EST MOD 30 MIN: CPT | Mod: HCNC,25,S$GLB, | Performed by: INTERNAL MEDICINE

## 2019-10-10 PROCEDURE — 3074F SYST BP LT 130 MM HG: CPT | Mod: HCNC,CPTII,S$GLB, | Performed by: INTERNAL MEDICINE

## 2019-10-10 PROCEDURE — G0008 ADMIN INFLUENZA VIRUS VAC: HCPCS | Mod: HCNC,S$GLB,, | Performed by: INTERNAL MEDICINE

## 2019-10-10 PROCEDURE — 3079F DIAST BP 80-89 MM HG: CPT | Mod: HCNC,CPTII,S$GLB, | Performed by: INTERNAL MEDICINE

## 2019-10-10 PROCEDURE — 99214 PR OFFICE/OUTPT VISIT, EST, LEVL IV, 30-39 MIN: ICD-10-PCS | Mod: HCNC,25,S$GLB, | Performed by: INTERNAL MEDICINE

## 2019-10-10 PROCEDURE — 90686 FLU VACCINE (QUAD) GREATER THAN OR EQUAL TO 3YO PRESERVATIVE FREE IM: ICD-10-PCS | Mod: HCNC,S$GLB,, | Performed by: INTERNAL MEDICINE

## 2019-10-10 PROCEDURE — 99999 PR PBB SHADOW E&M-EST. PATIENT-LVL V: ICD-10-PCS | Mod: PBBFAC,HCNC,, | Performed by: INTERNAL MEDICINE

## 2019-10-10 RX ORDER — FUROSEMIDE 20 MG/1
TABLET ORAL
Refills: 3 | COMMUNITY
Start: 2019-07-10 | End: 2021-06-17 | Stop reason: SDUPTHER

## 2019-10-10 RX ORDER — BENZONATATE 200 MG/1
200 CAPSULE ORAL 3 TIMES DAILY PRN
COMMUNITY
End: 2020-05-03 | Stop reason: SDUPTHER

## 2019-10-10 RX ORDER — SIMVASTATIN 40 MG/1
TABLET, FILM COATED ORAL
Refills: 3 | COMMUNITY
Start: 2019-07-10 | End: 2021-06-17 | Stop reason: SDUPTHER

## 2019-10-10 RX ORDER — GLIMEPIRIDE 1 MG/1
TABLET ORAL
Refills: 6 | COMMUNITY
Start: 2019-07-11 | End: 2019-10-10

## 2019-10-10 RX ORDER — ERGOCALCIFEROL 1.25 MG/1
CAPSULE ORAL
Refills: 0 | COMMUNITY
Start: 2019-07-11 | End: 2021-06-17 | Stop reason: SDUPTHER

## 2019-10-10 RX ORDER — BUSPIRONE HYDROCHLORIDE 15 MG/1
15 TABLET ORAL 2 TIMES DAILY
Qty: 60 TABLET | Refills: 11 | Status: SHIPPED | OUTPATIENT
Start: 2019-10-10 | End: 2019-11-07 | Stop reason: SDUPTHER

## 2019-10-10 RX ORDER — METHOTREXATE 2.5 MG/1
20 TABLET ORAL WEEKLY
Refills: 1 | COMMUNITY
Start: 2019-08-31 | End: 2020-12-18

## 2019-10-10 RX ORDER — CLOTRIMAZOLE AND BETAMETHASONE DIPROPIONATE 10; .64 MG/G; MG/G
CREAM TOPICAL 2 TIMES DAILY
Qty: 45 G | Refills: 3 | Status: SHIPPED | OUTPATIENT
Start: 2019-10-10

## 2019-10-10 RX ORDER — AMLODIPINE BESYLATE 10 MG/1
10 TABLET ORAL DAILY
Qty: 90 TABLET | Refills: 3
Start: 2019-10-10 | End: 2020-07-27 | Stop reason: SDUPTHER

## 2019-10-10 NOTE — ASSESSMENT & PLAN NOTE
Having lots of anxiety when she is sleeping, wakes up with panic attacks.  No longer taking lexapro because felt like it made her offbalance. Zoloft made her hallucinate.  Prozac gave her the shakes.  Just taking xanax prn. No SI/HI/panic attacks.

## 2019-10-10 NOTE — PROGRESS NOTES
Patient, Margarita Orourke (MRN #[id number]), presented with a recorded BMI of 35.09 kg/m^2 and a documented comorbidity(s):  - Diabetes Mellitus Type 2  - Hypertension  - Hyperlipidemia  to which the severe obesity is a contributing factor. This is consistent with the definition of severe obesity (BMI 35.0-39.9) with comorbidity (ICD-10 E66.01, Z68.35). The patient's severe obesity was monitored, evaluated, addressed and/or treated. This addendum to the medical record is made on 10/10/2019.

## 2019-10-10 NOTE — ASSESSMENT & PLAN NOTE
Stable on glimepiride 1 mg daily and lantus 20 units at night.  Having some low blood glucoses in 60's.  Highest glucoses are 180's.

## 2019-10-10 NOTE — PROGRESS NOTES
[facility] Note    Chief Complaint      Chief Complaint   Patient presents with    Follow-up     History of Present Illness      Margarita Orourke is a 64 y.o. female who presents today to establish care for DM2.  Previous patient of mine at .  Patient comes to appointment alone.     Problem List Items Addressed This Visit     Essential hypertension    Current Assessment & Plan     BP controlled on Norvasc 5 mg daily, Lasix, Irbesartan 300 mg daily. No CP/SOB.         Pure hypercholesterolemia    Current Assessment & Plan     Stable on Zocor 40 mg daily, no myalgias.         Type 2 diabetes mellitus without complication, without long-term current use of insulin - Primary    Current Assessment & Plan     Stable on glimepiride 1 mg daily and lantus 20 units at night.  Having some low blood glucoses in 60's.  Highest glucoses are 180's.         Anxiety    Current Assessment & Plan     Having lots of anxiety when she is sleeping, wakes up with panic attacks.  No longer taking lexapro because felt like it made her offbalance. Zoloft made her hallucinate.  Prozac gave her the shakes.  Just taking xanax prn. No SI/HI/panic attacks.         COPD (chronic obstructive pulmonary disease)    Current Assessment & Plan     Stable on symbicort with albuterol PRN.         Rheumatoid arthritis    Current Assessment & Plan     Sees rheum Dr. Yin at .  On Plaquenil and MTX/folate.  UTD on eye exam.  Worst in hips/knees.         Vitamin D deficiency    Current Assessment & Plan     On supplementation, no issues.         History of tobacco abuse    Current Assessment & Plan     No longer smoking.             Other Visit Diagnoses     Encounter for screening mammogram for breast cancer        Relevant Orders    Mammo Digital Screening Bilat    Asymptomatic postmenopausal state        Relevant Orders    DXA Bone Density Spine And Hip          Health Maintenance   Topic Date Due    Hepatitis C  Screening  1955    TETANUS VACCINE  08/02/1973    Pap Smear with HPV Cotest  08/02/1976    Mammogram  09/07/2015    Hemoglobin A1c  04/08/2020    Eye Exam  07/10/2020    Lipid Panel  10/08/2020    Low Dose Statin  10/10/2020    Foot Exam  10/10/2020    Colonoscopy  10/10/2029    Pneumococcal Vaccine (Medium Risk)  Completed       Past Medical History:   Diagnosis Date    Anxiety     Arthritis     Asthma     Back pain     Depression     Diabetes mellitus     Eczema     GERD (gastroesophageal reflux disease)     Hepatitis B     Hyperlipidemia     Hypertension     Seizures        History reviewed. No pertinent surgical history.    family history is not on file.    Social History     Tobacco Use    Smoking status: Current Every Day Smoker     Packs/day: 0.50   Substance Use Topics    Alcohol use: No    Drug use: No       Review of Systems   Constitutional: Negative for chills and fever.   HENT: Negative for congestion and sore throat.    Eyes: Negative for blurred vision and discharge.   Respiratory: Negative for cough and shortness of breath.    Cardiovascular: Negative for chest pain and palpitations.   Gastrointestinal: Negative for constipation, diarrhea, nausea and vomiting.   Genitourinary: Negative for dysuria and hematuria.   Musculoskeletal: Negative for falls and myalgias.   Skin: Negative for itching and rash.   Neurological: Negative for dizziness and headaches.        Outpatient Encounter Medications as of 10/10/2019   Medication Sig Dispense Refill    ALPRAZolam (XANAX) 0.25 MG tablet Take 0.25 mg by mouth 3 (three) times daily.      amLODIPine (NORVASC) 10 MG tablet Take 1 tablet (10 mg total) by mouth once daily. 90 tablet 3    benzonatate (TESSALON) 200 MG capsule Take 200 mg by mouth 3 (three) times daily as needed for Cough.      budesonide-formoterol 160-4.5 mcg (SYMBICORT) 160-4.5 mcg/actuation HFAA Inhale 2 puffs into the lungs every 12 (twelve) hours. Controller   "    ergocalciferol (ERGOCALCIFEROL) 50,000 unit Cap TK ONE C PO ONCE WEEKLY  0    escitalopram oxalate (LEXAPRO) 5 MG Tab Take 5 mg by mouth once daily.      folic acid (FOLVITE) 1 MG tablet Take 1 mg by mouth once daily.      furosemide (LASIX) 20 MG tablet TK 1 T PO D PRF SWELLING  3    hydroxychloroquine (PLAQUENIL) 200 mg tablet Take 200 mg by mouth once daily.      ibuprofen (ADVIL,MOTRIN) 600 MG tablet Take 1 tablet (600 mg total) by mouth every 6 (six) hours as needed for Pain. 30 tablet 0    insulin glargine (LANTUS) 100 unit/mL injection Inject 42 Units into the skin every evening.      irbesartan (AVAPRO) 300 MG tablet Take 300 mg by mouth every evening.      methotrexate 2.5 MG Tab Take 20 mg by mouth once a week.  1    omeprazole (PRILOSEC) 20 MG capsule Take 20 mg by mouth once daily.      simvastatin (ZOCOR) 40 MG tablet TK 1 T PO D  3    VENTOLIN HFA 90 mcg/actuation inhaler INHALE 2 PUFFS INTO THE LUNGS EVERY 6 HOURS AS NEEDED FOR WHEEZING. RESCUE 18 g 0    [DISCONTINUED] amLODIPine (NORVASC) 5 MG tablet Take 1 tablet (5 mg total) by mouth every morning. 30 tablet 0    busPIRone (BUSPAR) 15 MG tablet Take 1 tablet (15 mg total) by mouth 2 (two) times daily. 60 tablet 11    clotrimazole-betamethasone 1-0.05% (LOTRISONE) cream Apply topically 2 (two) times daily. 45 g 3    [DISCONTINUED] glimepiride (AMARYL) 1 MG tablet TK 1 T PO QD  6     No facility-administered encounter medications on file as of 10/10/2019.         Review of patient's allergies indicates:   Allergen Reactions    Zantac [ranitidine hcl] Nausea And Vomiting    Penicillins     Phenergan [promethazine] Nausea Only       Physical Exam      Vital Signs  Temp: 98.2 °F (36.8 °C)  Temp src: Oral  Pulse: 65  Resp: 16  SpO2: 99 %  BP: 120/84  BP Location: Left arm  Patient Position: Sitting  Pain Score: 0-No pain  Height and Weight  Height: 5' 5" (165.1 cm)  Weight: 95.6 kg (210 lb 13.6 oz)  BSA (Calculated - sq m): 2.09 sq " meters  BMI (Calculated): 35.2  Weight in (lb) to have BMI = 25: 149.9]    Physical Exam   Constitutional: She is oriented to person, place, and time. She appears well-developed and well-nourished.   HENT:   Head: Normocephalic and atraumatic.   Right Ear: External ear normal.   Left Ear: External ear normal.   Eyes: Right eye exhibits no discharge. Left eye exhibits no discharge.   Neck: Normal range of motion. No thyromegaly present.   Cardiovascular: Normal rate, regular rhythm, normal heart sounds and intact distal pulses.   No murmur heard.  Pulses:       Dorsalis pedis pulses are 2+ on the right side, and 2+ on the left side.        Posterior tibial pulses are 2+ on the right side, and 2+ on the left side.   Pulmonary/Chest: Effort normal and breath sounds normal. No respiratory distress.   Abdominal: Soft. Bowel sounds are normal. She exhibits no distension. There is no tenderness.   Musculoskeletal: Normal range of motion. She exhibits no deformity.        Right foot: There is normal range of motion and no deformity.        Left foot: There is normal range of motion and no deformity.   Feet:   Right Foot:   Protective Sensation: 5 sites tested. 5 sites sensed.   Skin Integrity: Negative for ulcer or blister.   Left Foot:   Protective Sensation: 5 sites tested. 5 sites sensed.   Skin Integrity: Negative for ulcer or blister.   Neurological: She is alert and oriented to person, place, and time.   Skin: Skin is warm and dry. No rash noted.   Psychiatric: She has a normal mood and affect. Her behavior is normal.        Laboratory:  CBC:  Recent Labs   Lab Result Units 07/26/19  0504 07/27/19  0533 10/08/19  1205   WBC Thousand/uL 5.77 7.07 5.6   RBC Million/uL 3.96* 3.75* 4.63   Hemoglobin g/dL 12.3 11.5* 14.1   Hematocrit % 37.2 35.4* 41.7   Platelets Thousand/uL 215 196 180   Mean Corpuscular Volume fL 94 94 90.1   Mean Corpuscular Hemoglobin pg 31.1* 30.7 30.5   Mean Corpuscular Hemoglobin Conc g/dL 33.1 32.5  33.8     CMP:  Recent Labs   Lab Result Units 07/26/19  0504  10/08/19  1205   Glucose mg/dL 167*   < > 128*   Calcium mg/dL 9.7   < > 9.6   Albumin g/dL 3.4*  --  4.1   Total Protein g/dL 7.2  --  7.2   Sodium mmol/L 138   < > 139   Potassium mmol/L 3.9   < > 4.0   CO2 mmol/L 23   < > 24   Chloride mmol/L 106   < > 105   BUN, Bld mg/dL 14   < > 12   Alkaline Phosphatase U/L 84  --  87   ALT U/L 16  --  13   AST U/L 15  --  14   Total Bilirubin mg/dL 0.4  --  0.5    < > = values in this interval not displayed.     URINALYSIS:  No results for input(s): COLORU, CLARITYU, SPECGRAV, PHUR, PROTEINUA, GLUCOSEU, BILIRUBINCON, BLOODU, WBCU, RBCU, BACTERIA, MUCUS, NITRITE, LEUKOCYTESUR, UROBILINOGEN, HYALINECASTS in the last 2160 hours.   LIPIDS:  Recent Labs   Lab Result Units 10/08/19  1205   HDL mg/dL 44*   Cholesterol mg/dL 171   Triglycerides mg/dL 40   LDL Cholesterol mg/dL (calc) 115*   Hdl/Cholesterol Ratio (calc) 3.9   Non HDL Chol. (LDL+VLDL) mg/dL (calc) 127     TSH:  No results for input(s): TSH in the last 2160 hours.  A1C:  Recent Labs   Lab Result Units 10/08/19  1205   Hemoglobin A1C % of total Hgb 7.4*       Radiology:  CXR  7/2019: Pulmonary infiltrate at the right lung base with appearance suggesting developing confluence    Assessment/Plan     Margarita Orourke is a 64 y.o.female with:    1. Essential hypertension    2. Pure hypercholesterolemia    3. Type 2 diabetes mellitus without complication, without long-term current use of insulin    4. Anxiety    5. Chronic obstructive pulmonary disease, unspecified COPD type    6. Rheumatoid arthritis, involving unspecified site, unspecified rheumatoid factor presence    7. Vitamin D deficiency    8. History of tobacco abuse    9. Encounter for screening mammogram for breast cancer  - Mammo Digital Screening Bilat; Future  - Mammo Digital Screening Bilat    10. Asymptomatic postmenopausal state  - DXA Bone Density Spine And Hip; Future  - DXA Bone Density Spine And  Hip      -Having some high BP's at night, will change norvasc to 10 mg at night instead of 5 mg BID  -having some hypoglycemia, stop glimepiride.  Increase Lantus by 2 units, given taper instructions  -Didn't tolerate several SSRI's, will try Buspar 15 mg BID.  Ok to continue xanax PRN in the meantime.  -Continue current medications and maintain follow up with specialists.  Return to clinic in 6 months.      Verena Amaral MD  [facility]

## 2019-10-11 ENCOUNTER — TELEPHONE (OUTPATIENT)
Dept: FAMILY MEDICINE | Facility: CLINIC | Age: 64
End: 2019-10-11

## 2019-10-11 NOTE — TELEPHONE ENCOUNTER
I know she is concerned about taking the full dose of norvasc but I think she needs it.  Tell her to take 2 of her 5 mg norvasc at night.  Call me with BP's on Monday.

## 2019-10-11 NOTE — TELEPHONE ENCOUNTER
----- Message from Dayana Stout sent at 10/11/2019  1:56 PM CDT -----  Type:  Needs Medical Advice    Who Called: pt  Symptoms (please be specific): elevated blood pressure   How long has patient had these symptoms: since yesterday  [facility] and [facility] #: [facility]  Would the patient rather a call back or a response via [facility]? Call back  Best Call Back Number: [phone]  Additional Information:

## 2019-10-11 NOTE — TELEPHONE ENCOUNTER
Pt states her BP has been 166/98, 157/105. Pulse 85. Took xanax to try to get the bp to go down, lowest it got was 140/86. Was on clonidine in the past, which seemed to help. C/o headaches, dizziness/feels off balance, and has blurred vision. Said right side of head down to the neck starts to burn and hurt.

## 2019-10-16 ENCOUNTER — TELEPHONE (OUTPATIENT)
Dept: FAMILY MEDICINE | Facility: CLINIC | Age: 64
End: 2019-10-16

## 2019-10-16 RX ORDER — INSULIN GLARGINE 100 [IU]/ML
42 INJECTION, SOLUTION SUBCUTANEOUS NIGHTLY
Qty: 3 VIAL | Refills: 11 | Status: SHIPPED | OUTPATIENT
Start: 2019-10-16 | End: 2021-06-17

## 2019-10-16 NOTE — TELEPHONE ENCOUNTER
----- Message from Nova Bush sent at 10/16/2019 10:30 AM CDT -----  Contact: [phone] or [phone]/self  Patient requesting to speak with you regarding her insulin medication not being at the pharmacy. Pt stated that medication is almost out. Please advise.

## 2019-10-17 ENCOUNTER — TELEPHONE (OUTPATIENT)
Dept: FAMILY MEDICINE | Facility: CLINIC | Age: 64
End: 2019-10-17

## 2019-10-17 NOTE — TELEPHONE ENCOUNTER
----- Message from Allison Vasquez sent at 10/17/2019 11:45 AM CDT -----  Contact: [phone]/ self   Patient requesting to speak with you regarding her medication insulin glargine (LANTUS) 100 unit/mL injection. Patient states that the bottle was sent but she can only use the pen. Please call.

## 2019-10-21 ENCOUNTER — HOSPITAL ENCOUNTER (EMERGENCY)
Facility: HOSPITAL | Age: 64
Discharge: HOME | End: 2019-10-21
Attending: EMERGENCY MEDICINE
Payer: MEDICARE

## 2019-10-21 VITALS
TEMPERATURE: 98 F | RESPIRATION RATE: 18 BRPM | WEIGHT: 208 LBS | HEART RATE: 74 BPM | BODY MASS INDEX: 34.66 KG/M2 | HEIGHT: 65 IN | OXYGEN SATURATION: 99 % | SYSTOLIC BLOOD PRESSURE: 142 MMHG | DIASTOLIC BLOOD PRESSURE: 72 MMHG

## 2019-10-21 DIAGNOSIS — R07.9 CHEST PAIN: ICD-10-CM

## 2019-10-21 DIAGNOSIS — R07.89 ATYPICAL CHEST PAIN: Primary | ICD-10-CM

## 2019-10-21 LAB
ALBUMIN SERPL BCP-MCNC: 3.6 G/DL (ref 3.5–5.2)
ALP SERPL-CCNC: 95 U/L (ref 55–135)
ALT SERPL W/O P-5'-P-CCNC: 18 U/L (ref 10–44)
ANION GAP SERPL CALC-SCNC: 8 MMOL/L (ref 8–16)
AST SERPL-CCNC: 13 U/L (ref 10–40)
BASOPHILS # BLD AUTO: 0.03 K/UL (ref 0–0.2)
BASOPHILS NFR BLD: 0.3 % (ref 0–1.9)
BILIRUB SERPL-MCNC: 0.2 MG/DL (ref 0.1–1)
BNP SERPL-MCNC: 32 PG/ML (ref 0–99)
BUN SERPL-MCNC: 11 MG/DL (ref 8–23)
CALCIUM SERPL-MCNC: 9.7 MG/DL (ref 8.7–10.5)
CHLORIDE SERPL-SCNC: 106 MMOL/L (ref 95–110)
CO2 SERPL-SCNC: 26 MMOL/L (ref 23–29)
CREAT SERPL-MCNC: 0.8 MG/DL (ref 0.5–1.4)
D DIMER PPP IA.FEU-MCNC: 0.29 MG/L FEU
DIFFERENTIAL METHOD: ABNORMAL
EOSINOPHIL # BLD AUTO: 0.1 K/UL (ref 0–0.5)
EOSINOPHIL NFR BLD: 1 % (ref 0–8)
ERYTHROCYTE [DISTWIDTH] IN BLOOD BY AUTOMATED COUNT: 13.9 % (ref 11.5–14.5)
EST. GFR  (AFRICAN AMERICAN): >60 ML/MIN/1.73 M^2
EST. GFR  (NON AFRICAN AMERICAN): >60 ML/MIN/1.73 M^2
GLUCOSE SERPL-MCNC: 170 MG/DL (ref 70–110)
HCT VFR BLD AUTO: 38.6 % (ref 37–48.5)
HGB BLD-MCNC: 12.7 G/DL (ref 12–16)
LYMPHOCYTES # BLD AUTO: 1.5 K/UL (ref 1–4.8)
LYMPHOCYTES NFR BLD: 15.3 % (ref 18–48)
MCH RBC QN AUTO: 30.8 PG (ref 27–31)
MCHC RBC AUTO-ENTMCNC: 32.9 G/DL (ref 32–36)
MCV RBC AUTO: 94 FL (ref 82–98)
MONOCYTES # BLD AUTO: 0.6 K/UL (ref 0.3–1)
MONOCYTES NFR BLD: 6.5 % (ref 4–15)
NEUTROPHILS # BLD AUTO: 7.4 K/UL (ref 1.8–7.7)
NEUTROPHILS NFR BLD: 76.9 % (ref 38–73)
PLATELET # BLD AUTO: 204 K/UL (ref 150–350)
PMV BLD AUTO: 10.7 FL (ref 9.2–12.9)
POTASSIUM SERPL-SCNC: 4 MMOL/L (ref 3.5–5.1)
PROT SERPL-MCNC: 7.5 G/DL (ref 6–8.4)
RBC # BLD AUTO: 4.13 M/UL (ref 4–5.4)
SODIUM SERPL-SCNC: 140 MMOL/L (ref 136–145)
TROPONIN I SERPL DL<=0.01 NG/ML-MCNC: 0.02 NG/ML (ref 0–0.03)
TROPONIN I SERPL DL<=0.01 NG/ML-MCNC: <0.006 NG/ML (ref 0–0.03)
WBC # BLD AUTO: 9.63 K/UL (ref 3.9–12.7)

## 2019-10-21 PROCEDURE — 80053 COMPREHEN METABOLIC PANEL: CPT | Mod: HCNC

## 2019-10-21 PROCEDURE — 93005 ELECTROCARDIOGRAM TRACING: CPT | Mod: HCNC

## 2019-10-21 PROCEDURE — 84484 ASSAY OF TROPONIN QUANT: CPT | Mod: 91,HCNC

## 2019-10-21 PROCEDURE — 93010 EKG 12-LEAD: ICD-10-PCS | Mod: HCNC,,, | Performed by: STUDENT IN AN ORGANIZED HEALTH CARE EDUCATION/TRAINING PROGRAM

## 2019-10-21 PROCEDURE — 93010 ELECTROCARDIOGRAM REPORT: CPT | Mod: HCNC,,, | Performed by: STUDENT IN AN ORGANIZED HEALTH CARE EDUCATION/TRAINING PROGRAM

## 2019-10-21 PROCEDURE — 83880 ASSAY OF NATRIURETIC PEPTIDE: CPT | Mod: HCNC

## 2019-10-21 PROCEDURE — 85025 COMPLETE CBC W/AUTO DIFF WBC: CPT | Mod: HCNC

## 2019-10-21 PROCEDURE — 84484 ASSAY OF TROPONIN QUANT: CPT | Mod: HCNC

## 2019-10-21 PROCEDURE — 85379 FIBRIN DEGRADATION QUANT: CPT | Mod: HCNC

## 2019-10-21 PROCEDURE — 25000003 PHARM REV CODE 250: Mod: HCNC | Performed by: EMERGENCY MEDICINE

## 2019-10-21 PROCEDURE — 99285 EMERGENCY DEPT VISIT HI MDM: CPT | Mod: 25,HCNC

## 2019-10-21 RX ORDER — AZITHROMYCIN 250 MG/1
TABLET, FILM COATED ORAL
Qty: 6 TABLET | Refills: 0 | Status: SHIPPED | OUTPATIENT
Start: 2019-10-21 | End: 2020-01-21

## 2019-10-21 RX ORDER — INSULIN GLARGINE 100 [IU]/ML
40 INJECTION, SOLUTION SUBCUTANEOUS DAILY
COMMUNITY
End: 2019-10-21 | Stop reason: SDUPTHER

## 2019-10-21 RX ORDER — PEN NEEDLE, DIABETIC 30 GX3/16"
1 NEEDLE, DISPOSABLE MISCELLANEOUS DAILY
Qty: 1 EACH | Refills: 3 | Status: SHIPPED | OUTPATIENT
Start: 2019-10-21 | End: 2021-04-12 | Stop reason: SDUPTHER

## 2019-10-21 RX ORDER — INSULIN GLARGINE 100 [IU]/ML
40 INJECTION, SOLUTION SUBCUTANEOUS DAILY
Qty: 3 SYRINGE | Refills: 11 | Status: SHIPPED | OUTPATIENT
Start: 2019-10-21 | End: 2020-12-18

## 2019-10-21 RX ORDER — ASPIRIN 325 MG
325 TABLET ORAL
Status: COMPLETED | OUTPATIENT
Start: 2019-10-21 | End: 2019-10-21

## 2019-10-21 RX ORDER — PEN NEEDLE, DIABETIC 30 GX3/16"
1 NEEDLE, DISPOSABLE MISCELLANEOUS DAILY
COMMUNITY
End: 2019-10-21 | Stop reason: SDUPTHER

## 2019-10-21 RX ADMIN — ASPIRIN 325 MG ORAL TABLET 325 MG: 325 PILL ORAL at 01:10

## 2019-10-21 NOTE — ED NOTES
Pt awake, advised pt that we are waiting on results from final lab. SR up Bed locked and low CB in reach. Pt on CM, cont pulse ox. Family member at bedside.

## 2019-10-21 NOTE — TELEPHONE ENCOUNTER
"Pt states her lantus is the lantus solostar pens 40 units daily, we sent in vials. Ok to call In pens and needles?     Also fyi, pt went to ER and got a zpac, states her "lungs were squeezing again"  "

## 2019-10-21 NOTE — ED PROVIDER NOTES
Encounter Date: 10/21/2019    SCRIBE #1 NOTE: I, Lorie Villar, am scribing for, and in the presence of,  Dr. Munoz. I have scribed the following portions of the note - Other sections scribed: HPI, ROS, PE.       History     Chief Complaint   Patient presents with    Chest Pain     64y F to ED via  EMS from home with c/o 10/10 midsternal CP with SOB. pt given 324mg ASA in route     Margarita Orourke is a 64 y.o. female with HTN,  HLD CHF, and IDDM who presents to the ED via EMS complaining of chest pain worse during deep inspiration and movement. No clear onset. Describes a heaviness to her chest. Pain radiates down right arm. She reports associated shortness of breath. She was admitted in July for PNA and has been SOB since. She is a current smoker and notes dry cough. Denies EtOH use. Reports vomiting en route to ED. Denies fever, or diaphoresis.    The history is provided by the patient.     Review of patient's allergies indicates:   Allergen Reactions    Zantac [ranitidine hcl] Nausea And Vomiting    Penicillins     Phenergan [promethazine] Nausea Only     Past Medical History:   Diagnosis Date    Anxiety     Arthritis     Asthma     Back pain     Depression     Diabetes mellitus     Eczema     GERD (gastroesophageal reflux disease)     Hepatitis B     Hyperlipidemia     Hypertension     Seizures      No past surgical history on file.  No family history on file.  Social History     Tobacco Use    Smoking status: Current Every Day Smoker     Packs/day: 0.50   Substance Use Topics    Alcohol use: No    Drug use: No     Review of Systems   Constitutional: Negative for fever.   HENT: Negative for sore throat.    Respiratory: Positive for cough and shortness of breath.    Cardiovascular: Positive for chest pain.   Gastrointestinal: Positive for vomiting. Negative for nausea.   Genitourinary: Negative for dysuria.   Musculoskeletal: Negative for back pain.   Skin: Negative for rash.    Neurological: Negative for weakness.   Hematological: Does not bruise/bleed easily.   All other systems reviewed and are negative.      Physical Exam     Initial Vitals [10/21/19 0036]   BP Pulse Resp Temp SpO2   113/69 (!) 53 14 97.9 °F (36.6 °C) 100 %      MAP       --         Physical Exam    Nursing note and vitals reviewed.  Constitutional: She appears well-developed and well-nourished.   HENT:   Head: Normocephalic and atraumatic.   Right Ear: External ear normal.   Left Ear: External ear normal.   Eyes: Conjunctivae and EOM are normal. Pupils are equal, round, and reactive to light.   Neck: Normal range of motion. Neck supple.   Cardiovascular: Normal rate, regular rhythm, normal heart sounds and intact distal pulses. Exam reveals no gallop and no friction rub.    No murmur heard.  Pulmonary/Chest: Breath sounds normal. No respiratory distress. She has no wheezes. She has no rhonchi. She has no rales.   Abdominal: Soft. Bowel sounds are normal. She exhibits no distension. There is no tenderness. There is no rebound and no guarding.   Musculoskeletal: Normal range of motion. She exhibits no edema.   Neurological: She is alert and oriented to person, place, and time. GCS score is 15. GCS eye subscore is 4. GCS verbal subscore is 5. GCS motor subscore is 6.   Skin: Skin is warm and dry.   Psychiatric: She has a normal mood and affect.         ED Course   Procedures  Labs Reviewed   CBC W/ AUTO DIFFERENTIAL - Abnormal; Notable for the following components:       Result Value    Gran% 76.9 (*)     Lymph% 15.3 (*)     All other components within normal limits   COMPREHENSIVE METABOLIC PANEL - Abnormal; Notable for the following components:    Glucose 170 (*)     All other components within normal limits   TROPONIN I   B-TYPE NATRIURETIC PEPTIDE   D DIMER, QUANTITATIVE   TROPONIN I        ECG Results          EKG 12-lead (Final result)  Result time 10/21/19 11:33:39    Final result by Interface, Lab In [facility]  (10/21/19 11:33:39)                 Narrative:    Test Reason : R07.9,    Vent. Rate : 050 BPM     Atrial Rate : 050 BPM     P-R Int : 166 ms          QRS Dur : 086 ms      QT Int : 430 ms       P-R-T Axes : 066 060 028 degrees     QTc Int : 392 ms    Sinus bradycardia  T wave abnormality, consider anterolateral ischemia  Abnormal ECG  When compared with ECG of 03-AUG-2019 04:37,  Vent. rate has decreased BY  25 BPM  Confirmed by Jose Lin MD ([id number]) on 10/21/2019 11:33:26 AM    Referred By: [email or web address]   SELF           Confirmed By:Jose Lin MD                            Imaging Results          X-Ray Chest AP Portable (Final result)  Result time 10/21/19 01:38:29    Final result by Sara Harrison MD (10/21/19 01:38:29)                 Impression:      No significant change when compared to the previous examination.      Electronically signed by: Sara Harrison  Date:    10/21/2019  Time:    01:38             Narrative:    EXAMINATION:  AP PORTABLE CHEST    CLINICAL HISTORY:  Chest Pain;    TECHNIQUE:  AP portable chest radiograph was submitted.    COMPARISON:  07/26/2019    FINDINGS:  AP portable chest radiograph demonstrates a stable cardiac silhouette.  There is redemonstration of patchy airspace opacity in the right lung base.  There is no pneumothorax or significant pleural fluid.                                 Medical Decision Making:   History:   Old Medical Records: I decided to obtain old medical records.  Clinical Tests:   Medical Tests: Ordered and Reviewed            Scribe Attestation:   Scribe #1: I performed the above scribed service and the documentation accurately describes the services I performed. I attest to the accuracy of the note.            ED Course as of Oct 31 0601   Mon Oct 21, 2019   0231 Patient signed out to Dr. May at 2:30 a.m. to check labs chest x-ray and re-evaluation of the patient's symptoms.    [ST]      ED Course User Index  [ST] Linda Munoz MD     Clinical  Impression:     1. Atypical chest pain    2. Chest pain        I, Linda Munoz, personally performed the services described in this documentation. All medical record entries made by the scribe were at my direction and in my presence.  I have reviewed the chart and agree that the record reflects my personal performance and is accurate and complete. Linda Munoz M.D. 6:01 AM10/31/2019                             Linda Munoz MD  10/31/19 0601

## 2019-10-21 NOTE — ED NOTES
Lab notified of need for repeat troponin. Also called regarding pending CMP, states will result in 15-20 minutes.

## 2019-10-21 NOTE — ED NOTES
Pt lying on stretcher with eyes closed resp even unlabored. Family member at bedside. Pt on CM, cont pulse ox. SR up Bed locked and low CB in reach. 2nd Troponin pending.

## 2019-10-21 NOTE — ED TRIAGE NOTES
Pt presents c/o pain to right upper chest wall worse on inspiration. States pain radiates down right arm.

## 2019-10-21 NOTE — ED NOTES
Second nurse, Annette Ngo RN unable to establish IV access. Dr. Munoz notified. Lab called to draw blood.

## 2019-10-21 NOTE — TELEPHONE ENCOUNTER
----- Message from Mary Sutton sent at 10/21/2019 10:15 AM CDT -----  Contact: Self   Pt requesting call back in reference to insulin pens not being right and would like a call back at  [phone].

## 2019-11-02 ENCOUNTER — HOSPITAL ENCOUNTER (EMERGENCY)
Facility: HOSPITAL | Age: 64
Discharge: HOME | End: 2019-11-02
Attending: EMERGENCY MEDICINE
Payer: MEDICARE

## 2019-11-02 VITALS
SYSTOLIC BLOOD PRESSURE: 127 MMHG | HEART RATE: 80 BPM | DIASTOLIC BLOOD PRESSURE: 73 MMHG | OXYGEN SATURATION: 100 % | BODY MASS INDEX: 34.66 KG/M2 | TEMPERATURE: 98 F | WEIGHT: 208 LBS | HEIGHT: 65 IN | RESPIRATION RATE: 20 BRPM

## 2019-11-02 DIAGNOSIS — F41.9 ANXIOUS MOOD: Primary | ICD-10-CM

## 2019-11-02 DIAGNOSIS — R07.9 CHEST PAIN: ICD-10-CM

## 2019-11-02 PROCEDURE — 93005 ELECTROCARDIOGRAM TRACING: CPT | Mod: HCNC

## 2019-11-02 PROCEDURE — 99283 EMERGENCY DEPT VISIT LOW MDM: CPT | Mod: 25,HCNC

## 2019-11-02 NOTE — ED NOTES
Patient reports wanting to leave home. States she was having a panic attack and did not wait long enough for medication to work. Will informed physician.

## 2019-11-02 NOTE — ED PROVIDER NOTES
Encounter Date: 11/2/2019    SCRIBE #1 NOTE: I, Nieves Valdes, am scribing for, and in the presence of,  Dr. Gracia. I have scribed the entire note.       History     Chief Complaint   Patient presents with    Chest Pain     Patient presents to ED with midtsernal chest pain that started about ten minutes ago described as tightness with SOB she states she took a 1/2 a xanax before arriving to ED.      Margarita Orourke is a 64 y.o. female who  has a past medical history of Anxiety, panic attacks, Arthritis, Asthma, Back pain, Depression, Diabetes mellitus, Eczema, GERD (gastroesophageal reflux disease), Hepatitis B, Hyperlipidemia, Hypertension, and Seizures P/w  Episode of chest tightness, hand tingling andSOB. Patient reports symptoms started tonight at 11 pm after waking from her sleep. She states she panicked causing her pressure to rise, which prompted her to come into the ED. She states this feels like her usual panic attack. Mentions she takes anxiolytic but did not take her usual dosage at the correct time. PT mentions she has had prior ED visits w/ similar presentations for anxiety and mentions it is also similar to those. Mentions this presentation is no different then usual panic attacks. Took her dosage prior to arrival to ED and mentions feeling much improved at time of interview.     The history is provided by the patient.     Review of patient's allergies indicates:   Allergen Reactions    Zantac [ranitidine hcl] Nausea And Vomiting    Penicillins     Phenergan [promethazine] Nausea Only     Past Medical History:   Diagnosis Date    Anxiety     Arthritis     Asthma     Back pain     Depression     Diabetes mellitus     Eczema     GERD (gastroesophageal reflux disease)     Hepatitis B     Hyperlipidemia     Hypertension     Seizures      History reviewed. No pertinent surgical history.  History reviewed. No pertinent family history.  Social History     Tobacco Use    Smoking status:  Former Smoker     Last attempt to quit: 2019     Years since quittin.4   Substance Use Topics    Alcohol use: No    Drug use: No     Review of Systems   Constitutional: Negative for chills and fever.   HENT: Negative for ear pain and sore throat.    Eyes: Negative for redness.   Respiratory: Positive for shortness of breath.    Cardiovascular: Positive for chest pain.   Gastrointestinal: Negative for abdominal pain, diarrhea and vomiting.   Genitourinary: Negative for dysuria.   Musculoskeletal: Negative for back pain.   Skin: Negative for rash.   Neurological: Negative for headaches.   Psychiatric/Behavioral: Positive for sleep disturbance. The patient is nervous/anxious.        Physical Exam     Initial Vitals [19 0100]   BP Pulse Resp Temp SpO2   (!) 147/80 87 16 97.9 °F (36.6 °C) 97 %      MAP       --         Physical Exam    Nursing note and vitals reviewed.  Constitutional: She appears well-developed and well-nourished. She is not diaphoretic. No distress.   HENT:   Head: Normocephalic and atraumatic.   Eyes: Conjunctivae and EOM are normal.   Neck: Normal range of motion. Neck supple.   Cardiovascular: Normal rate, regular rhythm and normal heart sounds.   Pulmonary/Chest: Breath sounds normal. No respiratory distress.   Abdominal: Soft. There is no tenderness.   Musculoskeletal: Normal range of motion. She exhibits no edema or tenderness.   Neurological: She is alert and oriented to person, place, and time. She has normal strength.   Skin: Skin is warm and dry. Capillary refill takes less than 2 seconds.   Psychiatric: She has a normal mood and affect.   Calm, in NAD,          ED Course   Procedures  Labs Reviewed - No data to display  EKG Readings: (Independently Interpreted)   NSR; Rate 91; T wave inversions in leads V3, V4, V5; AVF in lead 3; Baseline EKG compared to previous        Imaging Results    None          Medical Decision Making:   Initial Assessment:   This is a 64 y.o. female  with a history of Anxiety, Depression, Diabetes mellitus, Hypertension, and Seizures, presents to the ED for chest pain tightness and anxious feeling that began last night at 11 pm. VSSWNL. PE pt calm, in NAD, no complaints at moment.   Differential Diagnosis:   Ddx includes although not limited to:  Anxiety, arythmia, panic attack considered acs, pneumonia, although lower likeihood at this time considering pt's presentation and Hx.   Clinical Tests:   Medical Tests: Ordered and Reviewed  ED Management:  Plan: EKG and reassess.     Upon d/c pt well appearing, asymptomatic, no feeling of anxiety pt mentions feeling back to baseline. Considering prior Hx and current presentation likely panic attack. EKG w/ no changes compared to priors. D/c'd home with f/u instructions and return precautions. Pt and pt's son understand and had no further questions. Stable for d/c home.                       Clinical Impression:       ICD-10-CM ICD-9-CM   1. Anxious mood F41.9 300.00   2. Chest pain R07.9 786.50               I, Albert Gracia,  personally performed the services described in this documentation. All medical record entries made by the scribe were at my direction and in my presence.  I have reviewed the chart and agree that the record reflects my personal performance and is accurate and complete. Albert Gracia M.D .now                   Albert Gracia Jr., MD  11/03/19 1926

## 2019-11-02 NOTE — ED TRIAGE NOTES
Patient presents to the ED with reports of having woken up this morning with a panic attack. States having felt anxious with chest tightness and noted her blood pressure and heart rate at home were elevated. Patient states having taken her blood pressure medication and anxiety medications prior to arrival and reports having complete relief of her symptoms. Denies any chest pain or shortness of breath at this time. Reports having multiple episodes like the one that occurred tonight and is following up with her PCP.     Review of patient's allergies indicates:   Allergen Reactions    Zantac [ranitidine hcl] Nausea And Vomiting    Penicillins     Phenergan [promethazine] Nausea Only        Patient has verified the spelling of their name and  on armband.   APPEARANCE: Patient is alert, calm, oriented x 4, and does not appear distressed.  SKIN: Skin is normal for race, warm, and dry. Normal skin turgor and mucous membranes moist.  CARDIAC: Normal rate and rhythm, no murmur heard. Chest pain (resolved)  RESPIRATORY:Normal rate and effort. Breath sounds clear bilaterally throughout chest. Respirations are equal and unlabored.    GASTRO: Bowel sounds normal, abdomen is soft, no tenderness, and no abdominal distention.  MUSCLE: Full ROM. No bony tenderness or soft tissue tenderness. No obvious deformity.  PERIPHERAL VASCULAR: peripheral pulses present. Normal cap refill. No edema. Warm to touch.  MENTAL STATUS: awake, alert and aware of environment.  ENT: EARS: no obvious drainage. NOSE: no active bleeding. THROAT: no redness or swelling.

## 2019-11-07 ENCOUNTER — TELEPHONE (OUTPATIENT)
Dept: FAMILY MEDICINE | Facility: CLINIC | Age: 64
End: 2019-11-07

## 2019-11-07 DIAGNOSIS — I10 ESSENTIAL HYPERTENSION: ICD-10-CM

## 2019-11-07 DIAGNOSIS — F41.9 ANXIETY: Primary | ICD-10-CM

## 2019-11-07 RX ORDER — HYDROCHLOROTHIAZIDE 25 MG/1
25 TABLET ORAL DAILY
Qty: 30 TABLET | Refills: 11 | Status: SHIPPED | OUTPATIENT
Start: 2019-11-07 | End: 2020-01-21 | Stop reason: SDUPTHER

## 2019-11-07 RX ORDER — BUSPIRONE HYDROCHLORIDE 30 MG/1
30 TABLET ORAL 2 TIMES DAILY
Qty: 60 TABLET | Refills: 11 | Status: SHIPPED | OUTPATIENT
Start: 2019-11-07 | End: 2020-12-18

## 2019-11-07 NOTE — TELEPHONE ENCOUNTER
Pt states bp is not staying down. Has been back n forth to the ER ([facility] in [place]). Rheumatologist is ordering asleep study to see if her brain is getting enough oxygen. Stating she needs to switch her bp meds because they're not working, currently on Amlodipine 10mg 1 po qd Irbesartan 300mg 1 po qd. States she was on clonidine and hctz in the past and that seemed to work for the most part.       Yesterday BP was 210/117 pulse is 105-110  BP right now is 199/106

## 2019-11-07 NOTE — TELEPHONE ENCOUNTER
Start HCTZ 25 mg daily.  BP check in 2 weeks.    Also, increase dose of buspar to 30 mg BID to help with anxiety.    rx sent.

## 2019-11-07 NOTE — TELEPHONE ENCOUNTER
----- Message from Nedra Copeland sent at 11/7/2019 12:25 PM CST -----  Contact: pt  Pt called blood pressure med problems in er twice for it please call her      Pt can be reached [phone]

## 2019-11-11 ENCOUNTER — TELEPHONE (OUTPATIENT)
Dept: FAMILY MEDICINE | Facility: CLINIC | Age: 64
End: 2019-11-11

## 2019-11-11 NOTE — TELEPHONE ENCOUNTER
Spoke with pt and informed her that her orders were sent to . Also informed pt that someone from that department will call her to schedule those appts.

## 2019-11-11 NOTE — TELEPHONE ENCOUNTER
----- Message from Nisa Starr sent at 11/11/2019 10:43 AM CST -----  Contact: Pt   States she's calling to have the orders sent to [facility] for her Mammo and Dexa Bone Density and can be reached at [phone]//thanks/dbw

## 2020-01-13 ENCOUNTER — TELEPHONE (OUTPATIENT)
Dept: FAMILY MEDICINE | Facility: CLINIC | Age: 65
End: 2020-01-13

## 2020-01-13 NOTE — TELEPHONE ENCOUNTER
----- Message from Rosy Foss sent at 1/13/2020  3:31 PM CST -----  Contact: [phone]/self   Patient is requesting orders for a sleep test and refills on her medication. Please advise.

## 2020-01-14 NOTE — TELEPHONE ENCOUNTER
----- Message from Varsha hZu sent at 1/14/2020 12:30 PM CST -----  Contact: Self [phone] or [phone]  Patient would like a refill for ALPRAZolam (XANAX) 0.25 MG tablet sent to [id number] - [place], [place] - [address] AT [facility] [address] and needs a sleep test order. Patient needs another Zpack for congestion and cold. Please advise.

## 2020-01-15 RX ORDER — ALPRAZOLAM 0.25 MG/1
0.25 TABLET ORAL 3 TIMES DAILY
Qty: 90 TABLET | Refills: 0 | Status: SHIPPED | OUTPATIENT
Start: 2020-01-15 | End: 2020-03-09

## 2020-01-21 ENCOUNTER — OFFICE VISIT (OUTPATIENT)
Dept: FAMILY MEDICINE | Facility: CLINIC | Age: 65
End: 2020-01-21
Payer: MEDICARE

## 2020-01-21 VITALS
OXYGEN SATURATION: 97 % | BODY MASS INDEX: 36.17 KG/M2 | TEMPERATURE: 98 F | WEIGHT: 217.38 LBS | HEART RATE: 77 BPM | SYSTOLIC BLOOD PRESSURE: 138 MMHG | DIASTOLIC BLOOD PRESSURE: 82 MMHG

## 2020-01-21 DIAGNOSIS — Z11.4 SCREENING FOR HIV (HUMAN IMMUNODEFICIENCY VIRUS): ICD-10-CM

## 2020-01-21 DIAGNOSIS — M06.9 RHEUMATOID ARTHRITIS, INVOLVING UNSPECIFIED SITE, UNSPECIFIED RHEUMATOID FACTOR PRESENCE: ICD-10-CM

## 2020-01-21 DIAGNOSIS — F41.9 ANXIETY: ICD-10-CM

## 2020-01-21 DIAGNOSIS — E11.9 TYPE 2 DIABETES MELLITUS WITHOUT COMPLICATION, WITHOUT LONG-TERM CURRENT USE OF INSULIN: ICD-10-CM

## 2020-01-21 DIAGNOSIS — G47.30 SLEEP APNEA, UNSPECIFIED TYPE: ICD-10-CM

## 2020-01-21 DIAGNOSIS — I10 ESSENTIAL HYPERTENSION: ICD-10-CM

## 2020-01-21 DIAGNOSIS — Z11.59 NEED FOR HEPATITIS C SCREENING TEST: ICD-10-CM

## 2020-01-21 DIAGNOSIS — J44.9 CHRONIC OBSTRUCTIVE PULMONARY DISEASE, UNSPECIFIED COPD TYPE: ICD-10-CM

## 2020-01-21 DIAGNOSIS — F51.01 PRIMARY INSOMNIA: Primary | ICD-10-CM

## 2020-01-21 LAB
ESTIMATED AVG GLUCOSE: 160 MG/DL (ref 68–131)
HBA1C MFR BLD HPLC: 7.2 % (ref 4–5.6)

## 2020-01-21 PROCEDURE — 3079F DIAST BP 80-89 MM HG: CPT | Mod: HCNC,CPTII,S$GLB, | Performed by: INTERNAL MEDICINE

## 2020-01-21 PROCEDURE — 99499 UNLISTED E&M SERVICE: CPT | Mod: HCNC,S$GLB,, | Performed by: INTERNAL MEDICINE

## 2020-01-21 PROCEDURE — 99499 RISK ADDL DX/OHS AUDIT: ICD-10-PCS | Mod: HCNC,S$GLB,, | Performed by: INTERNAL MEDICINE

## 2020-01-21 PROCEDURE — 99999 PR PBB SHADOW E&M-EST. PATIENT-LVL IV: ICD-10-PCS | Mod: PBBFAC,HCNC,, | Performed by: INTERNAL MEDICINE

## 2020-01-21 PROCEDURE — 3075F PR MOST RECENT SYSTOLIC BLOOD PRESS GE 130-139MM HG: ICD-10-PCS | Mod: HCNC,CPTII,S$GLB, | Performed by: INTERNAL MEDICINE

## 2020-01-21 PROCEDURE — 86703 HIV-1/HIV-2 1 RESULT ANTBDY: CPT | Mod: HCNC

## 2020-01-21 PROCEDURE — 86803 HEPATITIS C AB TEST: CPT | Mod: HCNC

## 2020-01-21 PROCEDURE — 99214 OFFICE O/P EST MOD 30 MIN: CPT | Mod: HCNC,S$GLB,, | Performed by: INTERNAL MEDICINE

## 2020-01-21 PROCEDURE — 99214 PR OFFICE/OUTPT VISIT, EST, LEVL IV, 30-39 MIN: ICD-10-PCS | Mod: HCNC,S$GLB,, | Performed by: INTERNAL MEDICINE

## 2020-01-21 PROCEDURE — 3079F PR MOST RECENT DIASTOLIC BLOOD PRESSURE 80-89 MM HG: ICD-10-PCS | Mod: HCNC,CPTII,S$GLB, | Performed by: INTERNAL MEDICINE

## 2020-01-21 PROCEDURE — 3008F BODY MASS INDEX DOCD: CPT | Mod: HCNC,CPTII,S$GLB, | Performed by: INTERNAL MEDICINE

## 2020-01-21 PROCEDURE — 83036 HEMOGLOBIN GLYCOSYLATED A1C: CPT | Mod: HCNC

## 2020-01-21 PROCEDURE — 99999 PR PBB SHADOW E&M-EST. PATIENT-LVL IV: CPT | Mod: PBBFAC,HCNC,, | Performed by: INTERNAL MEDICINE

## 2020-01-21 PROCEDURE — 3008F PR BODY MASS INDEX (BMI) DOCUMENTED: ICD-10-PCS | Mod: HCNC,CPTII,S$GLB, | Performed by: INTERNAL MEDICINE

## 2020-01-21 PROCEDURE — 3075F SYST BP GE 130 - 139MM HG: CPT | Mod: HCNC,CPTII,S$GLB, | Performed by: INTERNAL MEDICINE

## 2020-01-21 RX ORDER — HYDROCHLOROTHIAZIDE 12.5 MG/1
12.5 TABLET ORAL DAILY
Qty: 90 TABLET | Refills: 3 | Status: SHIPPED | OUTPATIENT
Start: 2020-01-21 | End: 2020-04-29

## 2020-01-21 NOTE — PROGRESS NOTES
Patient, Margarita Orourke (MRN #[id number]), presented with a recorded BMI of 36.17 kg/m^2 and a documented comorbidity(s):  - Diabetes Mellitus Type 2  - Hypertension  to which the severe obesity is a contributing factor. This is consistent with the definition of severe obesity (BMI 35.0-39.9) with comorbidity (ICD-10 E66.01, Z68.35). The patient's severe obesity was monitored, evaluated, addressed and/or treated. This addendum to the medical record is made on 01/21/2020.

## 2020-01-21 NOTE — ASSESSMENT & PLAN NOTE
Goes to bed around 9 PM, is up and down, doesn't sleep.  Wakes up at 2:30 AM, BP goes up. Takes xanax and BP goes down and she will sleep until ~8:00 AM. Talks in her sleep and wakes herself up.  Feels sleepy all day, falls asleep sitting up all day.  Overweight.  Snores.

## 2020-01-21 NOTE — ASSESSMENT & PLAN NOTE
Started on Buspar last visit, doesn't feel like it is helping her.  Takes 1/2 xanax when she needs it.

## 2020-01-21 NOTE — PROGRESS NOTES
[facility] Note    Chief Complaint      Chief Complaint   Patient presents with    Insomnia     wants sleep study ordered     History of Present Illness      Margarita Orourke is a 64 y.o. female who presents today for insomnia.  Previous patient of mine at .  Patient comes to appointment alone.     Problem List Items Addressed This Visit     Essential hypertension    Current Assessment & Plan     BP controlled on Norvasc 10 mg daily, Lasix, HCTZ 25 mg, Irbesartan 300 mg daily. No CP/SOB.         Relevant Medications    hydroCHLOROthiazide (HYDRODIURIL) 12.5 MG Tab    Type 2 diabetes mellitus without complication, without long-term current use of insulin    Current Assessment & Plan     A1C 7.4 in 10/2019, stable on lantus 40 units at night.  Lowest glucose has been 100.         Relevant Orders    Hemoglobin A1c    Anxiety    Current Assessment & Plan     Started on Buspar last visit, doesn't feel like it is helping her.  Takes 1/2 xanax when she needs it.         COPD (chronic obstructive pulmonary disease)    Current Assessment & Plan     Stable on symbicort with albuterol PRN.         Rheumatoid arthritis    Current Assessment & Plan     Sees rheum Dr. Yin at .  10/2019 ESR/CRP at  were elevated. On Plaquenil and MTX/folate.  UTD on eye exam.  Worst in hips/knees.         Primary insomnia - Primary    Current Assessment & Plan     Goes to bed around 9 PM, is up and down, doesn't sleep.  Wakes up at 2:30 AM, BP goes up. Takes xanax and BP goes down and she will sleep until ~8:00 AM. Talks in her sleep and wakes herself up.  Feels sleepy all day, falls asleep sitting up all day.  Overweight.  Snores.         Relevant Orders    Home Sleep Studies      Other Visit Diagnoses     Sleep apnea, unspecified type        Relevant Orders    Home Sleep Studies    Need for hepatitis C screening test        Relevant Orders    Hepatitis C antibody    Screening for HIV (human  immunodeficiency virus)        Relevant Orders    HIV 1/2 Ag/Ab (4th Gen)          Health Maintenance   Topic Date Due    Hepatitis C Screening  1955    TETANUS VACCINE  1973    Pap Smear with HPV Cotest  1976    Mammogram  2015    Hemoglobin A1c  2020    Eye Exam  07/10/2020    Lipid Panel  10/08/2020    Low Dose Statin  10/10/2020    Foot Exam  2021    Colonoscopy  10/10/2029    Pneumococcal Vaccine (Medium Risk)  Completed       Past Medical History:   Diagnosis Date    Anxiety     Arthritis     Asthma     Back pain     Depression     Diabetes mellitus     Eczema     GERD (gastroesophageal reflux disease)     Hepatitis B     Hyperlipidemia     Hypertension     Seizures        History reviewed. No pertinent surgical history.    family history is not on file.    Social History     Tobacco Use    Smoking status: Former Smoker     Last attempt to quit: 2019     Years since quittin.6   Substance Use Topics    Alcohol use: No    Drug use: No       Review of Systems   Constitutional: Negative for chills and fever.   HENT: Negative for congestion and sore throat.    Eyes: Negative for blurred vision and discharge.   Respiratory: Negative for cough and shortness of breath.    Cardiovascular: Negative for chest pain and palpitations.   Gastrointestinal: Negative for constipation, diarrhea, nausea and vomiting.   Genitourinary: Negative for dysuria and hematuria.   Musculoskeletal: Negative for falls and myalgias.   Skin: Negative for itching and rash.   Neurological: Negative for dizziness and headaches.        Outpatient Encounter Medications as of 2020   Medication Sig Dispense Refill    ALPRAZolam (XANAX) 0.25 MG tablet Take 1 tablet (0.25 mg total) by mouth 3 (three) times daily. 90 tablet 0    amLODIPine (NORVASC) 10 MG tablet Take 1 tablet (10 mg total) by mouth once daily. 90 tablet 3    budesonide-formoterol 160-4.5 mcg (SYMBICORT)  "160-4.5 mcg/actuation HFAA Inhale 2 puffs into the lungs every 12 (twelve) hours. Controller      busPIRone (BUSPAR) 30 MG Tab Take 1 tablet (30 mg total) by mouth 2 (two) times daily. 60 tablet 11    clotrimazole-betamethasone 1-0.05% (LOTRISONE) cream Apply topically 2 (two) times daily. 45 g 3    ergocalciferol (ERGOCALCIFEROL) 50,000 unit Cap TK ONE C PO ONCE WEEKLY  0    folic acid (FOLVITE) 1 MG tablet Take 1 mg by mouth once daily.      hydroCHLOROthiazide (HYDRODIURIL) 12.5 MG Tab Take 1 tablet (12.5 mg total) by mouth once daily. 90 tablet 3    ibuprofen (ADVIL,MOTRIN) 600 MG tablet Take 1 tablet (600 mg total) by mouth every 6 (six) hours as needed for Pain. 30 tablet 0    insulin glargine (LANTUS) 100 unit/mL injection Inject 42 Units into the skin every evening. 3 vial 11    irbesartan (AVAPRO) 300 MG tablet Take 300 mg by mouth every evening.      methotrexate 2.5 MG Tab Take 20 mg by mouth once a week.  1    omeprazole (PRILOSEC) 20 MG capsule Take 20 mg by mouth once daily.      pen needle, diabetic 32 gauge x 5/32" Ndle 1 Box by Misc.(Non-Drug; Combo Route) route once daily. Use daily with lantus solostar 1 each 3    VENTOLIN HFA 90 mcg/actuation inhaler INHALE 2 PUFFS INTO THE LUNGS EVERY 6 HOURS AS NEEDED FOR WHEEZING. RESCUE 18 g 0    [DISCONTINUED] hydroCHLOROthiazide (HYDRODIURIL) 25 MG tablet Take 1 tablet (25 mg total) by mouth once daily. 30 tablet 11    benzonatate (TESSALON) 200 MG capsule Take 200 mg by mouth 3 (three) times daily as needed for Cough.      escitalopram oxalate (LEXAPRO) 5 MG Tab Take 5 mg by mouth once daily.      furosemide (LASIX) 20 MG tablet TK 1 T PO D PRF SWELLING  3    hydroxychloroquine (PLAQUENIL) 200 mg tablet Take 200 mg by mouth once daily.      insulin glargine 100 units/mL (3mL) SubQ pen Inject 40 Units into the skin once daily. 3 Syringe 11    simvastatin (ZOCOR) 40 MG tablet TK 1 T PO D  3    [DISCONTINUED] azithromycin (Z-MADELEINE) 250 MG " tablet Take first 2 tablets together, then 1 every day until finished. (Patient not taking: Reported on 1/21/2020) 6 tablet 0     No facility-administered encounter medications on file as of 1/21/2020.         Review of patient's allergies indicates:   Allergen Reactions    Zantac [ranitidine hcl] Nausea And Vomiting    Penicillins     Phenergan [promethazine] Nausea Only       Physical Exam      Vital Signs  Temp: 97.9 °F (36.6 °C)  Temp src: Oral  Pulse: 77  SpO2: 97 %  BP: 138/82  BP Location: Left arm  Patient Position: Sitting  Pain Score: 0-No pain  Height and Weight  Weight: 98.6 kg (217 lb 6 oz)]    Physical Exam   Constitutional: She is oriented to person, place, and time. She appears well-developed and well-nourished.   HENT:   Head: Normocephalic and atraumatic.   Right Ear: External ear normal.   Left Ear: External ear normal.   Eyes: Right eye exhibits no discharge. Left eye exhibits no discharge.   Neck: Normal range of motion. No thyromegaly present.   Cardiovascular: Normal rate, regular rhythm, normal heart sounds and intact distal pulses.   No murmur heard.  Pulses:       Dorsalis pedis pulses are 2+ on the right side, and 2+ on the left side.        Posterior tibial pulses are 2+ on the right side, and 2+ on the left side.   Pulmonary/Chest: Effort normal and breath sounds normal. No respiratory distress.   Abdominal: Soft. Bowel sounds are normal. She exhibits no distension. There is no tenderness.   Musculoskeletal: Normal range of motion. She exhibits no deformity.        Right foot: There is normal range of motion and no deformity.        Left foot: There is normal range of motion and no deformity.   Feet:   Right Foot:   Protective Sensation: 5 sites tested. 5 sites sensed.   Skin Integrity: Negative for ulcer or blister.   Left Foot:   Protective Sensation: 5 sites tested. 5 sites sensed.   Skin Integrity: Negative for ulcer or blister.   Neurological: She is alert and oriented to person,  place, and time.   Skin: Skin is warm and dry. No rash noted.   Psychiatric: She has a normal mood and affect. Her behavior is normal.        Laboratory:  CBC:  No results for input(s): WBC, RBC, HGB, HCT, PLT, MCV, MCH, MCHC in the last 2160 hours.  CMP:  No results for input(s): GLU, CALCIUM, ALBUMIN, PROT, NA, K, CO2, CL, BUN, ALKPHOS, ALT, AST, BILITOT in the last 2160 hours.    Invalid input(s): CREATININ  URINALYSIS:  No results for input(s): COLORU, CLARITYU, SPECGRAV, PHUR, PROTEINUA, GLUCOSEU, BILIRUBINCON, BLOODU, WBCU, RBCU, BACTERIA, MUCUS, NITRITE, LEUKOCYTESUR, UROBILINOGEN, HYALINECASTS in the last 2160 hours.   LIPIDS:  No results for input(s): TSH, HDL, CHOL, TRIG, LDLCALC, CHOLHDL, NONHDLCHOL, TOTALCHOLEST in the last 2160 hours.  TSH:  No results for input(s): TSH in the last 2160 hours.  A1C:  No results for input(s): HGBA1C in the last 2160 hours.    Radiology:  CXR  7/2019: Pulmonary infiltrate at the right lung base with appearance suggesting developing confluence    Assessment/Plan     Margarita Orourke is a 64 y.o.female with:    1. Primary insomnia  - Home Sleep Studies; Future    2. Anxiety    3. Essential hypertension  - hydroCHLOROthiazide (HYDRODIURIL) 12.5 MG Tab; Take 1 tablet (12.5 mg total) by mouth once daily.  Dispense: 90 tablet; Refill: 3    4. Chronic obstructive pulmonary disease, unspecified COPD type    5. Type 2 diabetes mellitus without complication, without long-term current use of insulin  - Hemoglobin A1c    6. Rheumatoid arthritis, involving unspecified site, unspecified rheumatoid factor presence    7. Sleep apnea, unspecified type  - Home Sleep Studies; Future    8. Need for hepatitis C screening test  - Hepatitis C antibody    9. Screening for HIV (human immunodeficiency virus)  - HIV 1/2 Ag/Ab (4th Gen)    -A1C today  -will order home sleep study give possible sleep apnea, sleep talking and insomnia.  -Continue current medications and maintain follow up with  specialists.  Return to clinic in PRMYRNA Amaral MD  [facility]

## 2020-01-21 NOTE — ASSESSMENT & PLAN NOTE
Sees rheum Dr. Yin at [facility].  10/2019 ESR/CRP at  were elevated. On Plaquenil and MTX/folate.  UTD on eye exam.  Worst in hips/knees.

## 2020-01-22 ENCOUNTER — TELEPHONE (OUTPATIENT)
Dept: FAMILY MEDICINE | Facility: CLINIC | Age: 65
End: 2020-01-22

## 2020-01-22 LAB
HCV AB SERPL QL IA: NEGATIVE
HIV 1+2 AB+HIV1 P24 AG SERPL QL IA: NEGATIVE

## 2020-01-22 NOTE — TELEPHONE ENCOUNTER
----- Message from Verena Amaral MD sent at 1/22/2020  3:16 PM CST -----  A1C improved at 7.2.  Work on diet, increase lantus by 5 units.  Will repeat next visit.    HIV and hep C are negative.

## 2020-01-22 NOTE — PROGRESS NOTES
A1C improved at 7.2.  Work on diet, increase lantus by 5 units.  Will repeat next visit.    HIV and hep C are negative.

## 2020-01-28 ENCOUNTER — TELEPHONE (OUTPATIENT)
Dept: SLEEP MEDICINE | Facility: OTHER | Age: 65
End: 2020-01-28

## 2020-02-06 ENCOUNTER — TELEPHONE (OUTPATIENT)
Dept: SLEEP MEDICINE | Facility: OTHER | Age: 65
End: 2020-02-06

## 2020-02-07 ENCOUNTER — HOSPITAL ENCOUNTER (OUTPATIENT)
Dept: SLEEP MEDICINE | Facility: OTHER | Age: 65
Discharge: HOME | End: 2020-02-07
Attending: INTERNAL MEDICINE
Payer: MEDICARE

## 2020-02-07 DIAGNOSIS — F51.01 PRIMARY INSOMNIA: ICD-10-CM

## 2020-02-07 DIAGNOSIS — G47.30 SLEEP APNEA, UNSPECIFIED TYPE: ICD-10-CM

## 2020-02-07 PROCEDURE — 95800 SLP STDY UNATTENDED: CPT | Mod: HCNC

## 2020-02-11 PROCEDURE — 95800 SLP STDY UNATTENDED: CPT | Mod: 26,HCNC,, | Performed by: INTERNAL MEDICINE

## 2020-02-11 PROCEDURE — 95800 PR SLEEP STUDY, UNATTENDED, RECORD HEART RATE/O2 SAT/RESP ANAL/SLEEP TIME: ICD-10-PCS | Mod: 26,HCNC,, | Performed by: INTERNAL MEDICINE

## 2020-02-13 ENCOUNTER — TELEPHONE (OUTPATIENT)
Dept: FAMILY MEDICINE | Facility: CLINIC | Age: 65
End: 2020-02-13

## 2020-02-13 RX ORDER — AZITHROMYCIN 250 MG/1
TABLET, FILM COATED ORAL
Qty: 6 TABLET | Refills: 0 | Status: SHIPPED | OUTPATIENT
Start: 2020-02-13 | End: 2020-02-18

## 2020-02-13 NOTE — PROCEDURES
"The sleep study that you ordered is complete.  You have ordered sleep LAB services to perform the sleep study for Margarita Orourke.     Please find Sleep Study result in  the "Media tab" of Chart Review menu.       You can look  for the report in the  Media by the document type "Sleep Study Documents". Alphabetizing  "Document type" column helps to find the SLEEP STUDY report  Faster.       As the ordering provider, you are responsible for reviewing the results and implementing a treatment plan with your patient.     If you need a Sleep Medicine provider to explain the sleep study findings and arrange treatment for the patient, please refer patient for consultation to our Sleep Clinic via [facility] with Ambulatory Consult Sleep.     To do that please place an order for an  "Ambulatory Consult Sleep" - it will go to our clinic work queue for our Medical Assistant to contact the patient for an appointment.     For any questions, please contact our clinic staff at [phone] to talk to clinical staff.     "

## 2020-02-13 NOTE — TELEPHONE ENCOUNTER
Spoke to pt, turned sleep study machine in earlier this week, told her we dont have the results yet, but once we get them we will call her. She is asking for a zpak bc of sinus pressure and congestion.

## 2020-02-13 NOTE — TELEPHONE ENCOUNTER
----- Message from Alissa Agosto sent at 2/13/2020  1:00 PM CST -----  Contact: Self  Pt is calling to speak with Staff regarding her test results.  In addition, she is requesting a Z-Felix is called in for her for a sinus issue she has.    She is requesting a returned call to [phone].    Thank you.

## 2020-03-09 RX ORDER — ALPRAZOLAM 0.25 MG/1
TABLET ORAL
Qty: 90 TABLET | Refills: 0 | Status: SHIPPED | OUTPATIENT
Start: 2020-03-09 | End: 2020-05-06 | Stop reason: SDUPTHER

## 2020-03-16 RX ORDER — ONDANSETRON 4 MG/1
4 TABLET, ORALLY DISINTEGRATING ORAL EVERY 12 HOURS
Qty: 30 TABLET | Refills: 3 | Status: SHIPPED | OUTPATIENT
Start: 2020-03-16

## 2020-03-16 RX ORDER — ONDANSETRON 4 MG/1
4 TABLET, ORALLY DISINTEGRATING ORAL
COMMUNITY
End: 2020-03-16 | Stop reason: SDUPTHER

## 2020-03-16 RX ORDER — ALBUTEROL SULFATE 90 UG/1
AEROSOL, METERED RESPIRATORY (INHALATION)
Qty: 18 G | Refills: 0 | Status: SHIPPED | OUTPATIENT
Start: 2020-03-16 | End: 2020-03-17

## 2020-03-16 NOTE — TELEPHONE ENCOUNTER
----- Message from Rosy Foss sent at 3/16/2020  3:24 PM CDT -----  Contact: [phone] or [phone]  Patient is requesting a refill for albuterol (VENTOLIN HFA) 90 mcg/actuation inhaler and ondansetron 4 mg dissolving tablet .  [facility] and [facility]. Please advise.

## 2020-03-17 RX ORDER — ALBUTEROL SULFATE 90 UG/1
AEROSOL, METERED RESPIRATORY (INHALATION)
Qty: 54 G | Refills: 3 | Status: SHIPPED | OUTPATIENT
Start: 2020-03-17

## 2020-03-26 ENCOUNTER — TELEPHONE (OUTPATIENT)
Dept: FAMILY MEDICINE | Facility: CLINIC | Age: 65
End: 2020-03-26

## 2020-03-26 NOTE — TELEPHONE ENCOUNTER
----- Message from Macie Powell sent at 3/26/2020  4:24 PM CDT -----  Contact: Jacinat with [employer] [facility] [employer]  ext [phone]  Jacinta states they faxed over a request for the most recent chart notes and certificate of medical necessity. Jacinta states the information can be faxed to  [phone]

## 2020-03-27 PROBLEM — E11.65 TYPE 2 DIABETES MELLITUS WITH HYPERGLYCEMIA, WITH LONG-TERM CURRENT USE OF INSULIN: Status: ACTIVE | Noted: 2019-10-10

## 2020-03-27 PROBLEM — Z79.4 TYPE 2 DIABETES MELLITUS WITH HYPERGLYCEMIA, WITH LONG-TERM CURRENT USE OF INSULIN: Status: ACTIVE | Noted: 2019-10-10

## 2020-03-31 ENCOUNTER — TELEPHONE (OUTPATIENT)
Dept: FAMILY MEDICINE | Facility: CLINIC | Age: 65
End: 2020-03-31

## 2020-03-31 RX ORDER — ALBUTEROL SULFATE 0.83 MG/ML
2.5 SOLUTION RESPIRATORY (INHALATION) EVERY 6 HOURS PRN
Qty: 90 ML | Refills: 1 | Status: SHIPPED | OUTPATIENT
Start: 2020-03-31 | End: 2021-03-31

## 2020-03-31 NOTE — TELEPHONE ENCOUNTER
----- Message from Marley Aponte sent at 3/31/2020  1:41 PM CDT -----  Contact: Self [phone]  Patient is calling to get refills on her medication sent to [id number] - [place], [place] - [address] AT [facility] [address] [phone] (Phone) [phone] (Fax)    1. Albuterol solution for her machine

## 2020-04-01 ENCOUNTER — TELEPHONE (OUTPATIENT)
Dept: FAMILY MEDICINE | Facility: CLINIC | Age: 65
End: 2020-04-01

## 2020-04-01 NOTE — TELEPHONE ENCOUNTER
Called and spoke with pt in regards of message. Pt informed me that she called the ambulance last night due to her BP. Patients states  Her BP was 225/121 and her heart rate was 105. Pt states they did an EKG on her as well while on ambulance to see what was going on. They kept her until her BP was in a good range. Pt states she also went and got tested for COVID-19 today due to a fever of 99.6. Pt was informed that she was able to get tested, and she is waiting for her results as well. Please advise.

## 2020-04-01 NOTE — TELEPHONE ENCOUNTER
----- Message from Regismonster Wetzelt sent at 4/1/2020  4:49 PM CDT -----  Contact: [phone] / self   Patient would like to speak with your office regarding her blood pressure, pt states the ambulance had to come to the patient's house to control it. Please Advise.

## 2020-04-02 NOTE — TELEPHONE ENCOUNTER
Spoke to pt, said last night it went up again, she was up until about 3am and took amlodipine and xanax, also took hctz. States she has a little cough, went for covid testing. States her BP is running 157/98 this morning when she woke up

## 2020-04-02 NOTE — TELEPHONE ENCOUNTER
Please find out how patient's BP is doing today.  Ask her to let me know when she gets her COVID results back.

## 2020-04-09 ENCOUNTER — TELEPHONE (OUTPATIENT)
Dept: FAMILY MEDICINE | Facility: CLINIC | Age: 65
End: 2020-04-09

## 2020-04-09 NOTE — TELEPHONE ENCOUNTER
Spoke with pt regarding switching to virtual visit. Pt granddaughter will download franko and they will call back to get set up for virtual visit.

## 2020-04-09 NOTE — TELEPHONE ENCOUNTER
----- Message from Allison Vasquez sent at 4/9/2020  3:47 PM CDT -----  Contact: [phone]/ self   Patient called in returning your call. Please advise.

## 2020-04-14 ENCOUNTER — PATIENT OUTREACH (OUTPATIENT)
Dept: ADMINISTRATIVE | Facility: HOSPITAL | Age: 65
End: 2020-04-14

## 2020-04-16 ENCOUNTER — OFFICE VISIT (OUTPATIENT)
Dept: FAMILY MEDICINE | Facility: CLINIC | Age: 65
End: 2020-04-16
Payer: MEDICARE

## 2020-04-16 DIAGNOSIS — I15.2 HYPERTENSION ASSOCIATED WITH DIABETES: ICD-10-CM

## 2020-04-16 DIAGNOSIS — F51.01 PRIMARY INSOMNIA: ICD-10-CM

## 2020-04-16 DIAGNOSIS — F41.9 ANXIETY: ICD-10-CM

## 2020-04-16 DIAGNOSIS — E11.65 TYPE 2 DIABETES MELLITUS WITH HYPERGLYCEMIA, WITH LONG-TERM CURRENT USE OF INSULIN: Primary | ICD-10-CM

## 2020-04-16 DIAGNOSIS — J44.9 CHRONIC OBSTRUCTIVE PULMONARY DISEASE, UNSPECIFIED COPD TYPE: ICD-10-CM

## 2020-04-16 DIAGNOSIS — E55.9 VITAMIN D DEFICIENCY: ICD-10-CM

## 2020-04-16 DIAGNOSIS — M06.9 RHEUMATOID ARTHRITIS, INVOLVING UNSPECIFIED SITE, UNSPECIFIED RHEUMATOID FACTOR PRESENCE: ICD-10-CM

## 2020-04-16 DIAGNOSIS — Z79.4 TYPE 2 DIABETES MELLITUS WITH HYPERGLYCEMIA, WITH LONG-TERM CURRENT USE OF INSULIN: Primary | ICD-10-CM

## 2020-04-16 DIAGNOSIS — E11.59 HYPERTENSION ASSOCIATED WITH DIABETES: ICD-10-CM

## 2020-04-16 DIAGNOSIS — E78.00 PURE HYPERCHOLESTEROLEMIA: ICD-10-CM

## 2020-04-16 PROCEDURE — 99443 PR PHYSICIAN TELEPHONE EVALUATION 21-30 MIN: ICD-10-PCS | Mod: HCNC,95,, | Performed by: INTERNAL MEDICINE

## 2020-04-16 PROCEDURE — 99443 PR PHYSICIAN TELEPHONE EVALUATION 21-30 MIN: CPT | Mod: HCNC,95,, | Performed by: INTERNAL MEDICINE

## 2020-04-16 RX ORDER — CLONIDINE HYDROCHLORIDE 0.1 MG/1
TABLET ORAL
Qty: 385 TABLET | OUTPATIENT
Start: 2020-04-16

## 2020-04-16 RX ORDER — PROMETHAZINE HYDROCHLORIDE AND DEXTROMETHORPHAN HYDROBROMIDE 6.25; 15 MG/5ML; MG/5ML
5 SYRUP ORAL EVERY 4 HOURS PRN
Qty: 118 ML | Refills: 0 | Status: SHIPPED | OUTPATIENT
Start: 2020-04-16 | End: 2020-04-26

## 2020-04-16 RX ORDER — CLONIDINE HYDROCHLORIDE 0.1 MG/1
0.1 TABLET ORAL EVERY 6 HOURS PRN
Qty: 30 TABLET | Refills: 1 | Status: SHIPPED | OUTPATIENT
Start: 2020-04-16 | End: 2020-08-10 | Stop reason: SDUPTHER

## 2020-04-16 NOTE — ASSESSMENT & PLAN NOTE
Had home sleep study with apnea episodes, not enough for LINDSEY< recommended in lab study.  Goes to bed around 9 PM, is up and down, doesn't sleep.  Wakes up at 2:30 AM, BP goes up. Takes xanax and BP goes down and she will sleep until ~8:00 AM. Talks in her sleep and wakes herself up.  Feels sleepy all day, falls asleep sitting up all day.  Overweight.  Snores.

## 2020-04-16 NOTE — PROGRESS NOTES
The patient location is: home  The chief complaint leading to consultation is: follow up on HTN, HLD  Visit type: audio only  Total time spent with patient: 10 minutes  Each patient to whom he or she provides medical services by telemedicine is:  (1) informed of the relationship between the physician and patient and the respective role of any other health care provider with respect to management of the patient; and (2) notified that he or she may decline to receive medical services by telemedicine and may withdraw from such care at any time.    [facility] Note    Chief Complaint      Chief Complaint   Patient presents with    Hypertension     History of Present Illness      Margarita Orourke is a 64 y.o. female who presents today for f/u HTN, insomnia.  Patient comes to appointment via audio visit.     Problem List Items Addressed This Visit     Hypertension associated with diabetes    Current Assessment & Plan     BP controlled on Norvasc 10 mg daily, Lasix, HCTZ 25 mg, Irbesartan 300 mg daily. No CP/SOB. BP elevated up to 200 SBP twice over last few months, called ambulance and it came down. Otherwise BP has been good. Used to take Clonidine PRN.         Pure hypercholesterolemia    Relevant Orders    Lipid panel    Type 2 diabetes mellitus with hyperglycemia, with long-term current use of insulin - Primary    Current Assessment & Plan     A1C 7.4 in 10/2019, stable on lantus 40 units at night.  Lowest glucose has been 100.  AM glucose today 126.         Relevant Orders    CBC auto differential    Comprehensive metabolic panel    Microalbumin/creatinine urine ratio    Hemoglobin A1c    Anxiety    Current Assessment & Plan     Has panic attacks on occasion.   Takes 1/2 xanax when she needs it.         COPD (chronic obstructive pulmonary disease)    Current Assessment & Plan     Stable on symbicort with albuterol PRN. Mild cough at present, no fever, no SOB.         Rheumatoid arthritis     Current Assessment & Plan     Sees rheum Dr. Yin at [facility].  10/2019 ESR/CRP at  were elevated. On Plaquenil and MTX/folate.  UTD on eye exam.  Worst in hips/knees.         Vitamin D deficiency    Current Assessment & Plan     On supplementation, no issues.         Primary insomnia    Current Assessment & Plan     Had home sleep study with apnea episodes, not enough for LINDSEY< recommended in lab study.  Goes to bed around 9 PM, is up and down, doesn't sleep.  Wakes up at 2:30 AM, BP goes up. Takes xanax and BP goes down and she will sleep until ~8:00 AM. Talks in her sleep and wakes herself up.  Feels sleepy all day, falls asleep sitting up all day.  Overweight.  Snores.               Health Maintenance   Topic Date Due    TETANUS VACCINE  1973    Pap Smear with HPV Cotest  1976    Eye Exam  07/10/2020    Hemoglobin A1c  2020    Lipid Panel  10/08/2020    Low Dose Statin  10/10/2020    Foot Exam  2021    Mammogram  2021    Colonoscopy  10/10/2029    Hepatitis C Screening  Completed    Pneumococcal Vaccine (Medium Risk)  Completed       Past Medical History:   Diagnosis Date    Anxiety     Arthritis     Asthma     Back pain     Depression     Diabetes mellitus     Eczema     GERD (gastroesophageal reflux disease)     Hepatitis B     Hyperlipidemia     Hypertension     Seizures        No past surgical history on file.    family history is not on file.    Social History     Tobacco Use    Smoking status: Former Smoker     Last attempt to quit: 2019     Years since quittin.8   Substance Use Topics    Alcohol use: No    Drug use: No       Review of Systems   Constitutional: Negative for chills and fever.   HENT: Negative for congestion and sore throat.    Eyes: Negative for blurred vision and discharge.   Respiratory: Negative for cough and shortness of breath.    Cardiovascular: Negative for chest pain and palpitations.   Gastrointestinal: Negative for  constipation, diarrhea, nausea and vomiting.   Genitourinary: Negative for dysuria and hematuria.   Musculoskeletal: Negative for falls and myalgias.   Skin: Negative for itching and rash.   Neurological: Negative for dizziness and headaches.        Outpatient Encounter Medications as of 4/16/2020   Medication Sig Dispense Refill    albuterol (PROVENTIL) 2.5 mg /3 mL (0.083 %) nebulizer solution Take 3 mLs (2.5 mg total) by nebulization every 6 (six) hours as needed for Wheezing. Rescue 90 mL 1    albuterol (PROVENTIL/VENTOLIN HFA) 90 mcg/actuation inhaler INHALE 2 PUFFS INTO THE LUNGS EVERY 6 HOURS AS NEEDED FOR WHEEZING 54 g 3    ALPRAZolam (XANAX) 0.25 MG tablet TAKE 1 TABLET BY MOUTH THREE TIMES DAILY 90 tablet 0    amLODIPine (NORVASC) 10 MG tablet Take 1 tablet (10 mg total) by mouth once daily. 90 tablet 3    benzonatate (TESSALON) 200 MG capsule Take 200 mg by mouth 3 (three) times daily as needed for Cough.      budesonide-formoterol 160-4.5 mcg (SYMBICORT) 160-4.5 mcg/actuation HFAA Inhale 2 puffs into the lungs every 12 (twelve) hours. Controller      busPIRone (BUSPAR) 30 MG Tab Take 1 tablet (30 mg total) by mouth 2 (two) times daily. 60 tablet 11    cloNIDine (CATAPRES) 0.1 MG tablet Take 1 tablet (0.1 mg total) by mouth every 6 (six) hours as needed (BP over 170/90). 30 tablet 1    clotrimazole-betamethasone 1-0.05% (LOTRISONE) cream Apply topically 2 (two) times daily. 45 g 3    ergocalciferol (ERGOCALCIFEROL) 50,000 unit Cap TK ONE C PO ONCE WEEKLY  0    folic acid (FOLVITE) 1 MG tablet Take 1 mg by mouth once daily.      furosemide (LASIX) 20 MG tablet TK 1 T PO D PRF SWELLING  3    hydroCHLOROthiazide (HYDRODIURIL) 12.5 MG Tab Take 1 tablet (12.5 mg total) by mouth once daily. 90 tablet 3    hydroxychloroquine (PLAQUENIL) 200 mg tablet Take 200 mg by mouth once daily.      ibuprofen (ADVIL,MOTRIN) 600 MG tablet Take 1 tablet (600 mg total) by mouth every 6 (six) hours as needed for  "Pain. 30 tablet 0    insulin glargine (LANTUS) 100 unit/mL injection Inject 42 Units into the skin every evening. 3 vial 11    insulin glargine 100 units/mL (3mL) SubQ pen Inject 40 Units into the skin once daily. (Patient taking differently: Inject 45 Units into the skin once daily. ) 3 Syringe 11    irbesartan (AVAPRO) 300 MG tablet Take 300 mg by mouth every evening.      methotrexate 2.5 MG Tab Take 20 mg by mouth once a week.  1    omeprazole (PRILOSEC) 20 MG capsule Take 20 mg by mouth once daily.      ondansetron (ZOFRAN-ODT) 4 MG TbDL Take 1 tablet (4 mg total) by mouth every 12 (twelve) hours. 30 tablet 3    pen needle, diabetic 32 gauge x 5/32" Ndle 1 Box by Misc.(Non-Drug; Combo Route) route once daily. Use daily with lantus solostar 1 each 3    promethazine-dextromethorphan (PROMETHAZINE-DM) 6.25-15 mg/5 mL Syrp Take 5 mLs by mouth every 4 (four) hours as needed. 118 mL 0    simvastatin (ZOCOR) 40 MG tablet TK 1 T PO D  3    [DISCONTINUED] escitalopram oxalate (LEXAPRO) 5 MG Tab Take 5 mg by mouth once daily.       No facility-administered encounter medications on file as of 4/16/2020.         Review of patient's allergies indicates:   Allergen Reactions    Zantac [ranitidine hcl] Nausea And Vomiting    Penicillins     Phenergan [promethazine] Nausea Only       Physical Exam       ] No vital signs taken by me as this is a virtual visit.  Any reported are patient's home measurements.      Physical Exam   Constitutional: She is oriented to person, place, and time. No distress.   Pulmonary/Chest: No respiratory distress.   Neurological: She is alert and oriented to person, place, and time.   Psychiatric: She has a normal mood and affect. Her behavior is normal. Judgment and thought content normal.        Laboratory:  CBC:  No results for input(s): WBC, RBC, HGB, HCT, PLT, MCV, MCH, MCHC in the last 2160 hours.  CMP:  No results for input(s): GLU, CALCIUM, ALBUMIN, PROT, NA, K, CO2, CL, BUN, " ALKPHOS, ALT, AST, BILITOT in the last 2160 hours.    Invalid input(s): CREATININ  URINALYSIS:  No results for input(s): COLORU, CLARITYU, SPECGRAV, PHUR, PROTEINUA, GLUCOSEU, BILIRUBINCON, BLOODU, WBCU, RBCU, BACTERIA, MUCUS, NITRITE, LEUKOCYTESUR, UROBILINOGEN, HYALINECASTS in the last 2160 hours.   LIPIDS:  No results for input(s): TSH, HDL, CHOL, TRIG, LDLCALC, CHOLHDL, NONHDLCHOL, TOTALCHOLEST in the last 2160 hours.  TSH:  No results for input(s): TSH in the last 2160 hours.  A1C:  Recent Labs   Lab Result Units 01/21/20  1011   Hemoglobin A1C % 7.2*       Radiology:  CXR  7/2019: Pulmonary infiltrate at the right lung base with appearance suggesting developing confluence    Assessment/Plan     Margarita Orourke is a 64 y.o.female with:    1. Type 2 diabetes mellitus with hyperglycemia, with long-term current use of insulin  - CBC auto differential; Future  - Comprehensive metabolic panel; Future  - Microalbumin/creatinine urine ratio; Future  - Hemoglobin A1c; Future  - CBC auto differential  - Comprehensive metabolic panel  - Microalbumin/creatinine urine ratio  - Hemoglobin A1c    2. Pure hypercholesterolemia  - Lipid panel; Future  - Lipid panel    3. Anxiety    4. Chronic obstructive pulmonary disease, unspecified COPD type    5. Primary insomnia    6. Rheumatoid arthritis, involving unspecified site, unspecified rheumatoid factor presence    7. Vitamin D deficiency    8. Hypertension associated with diabetes    -will need sleep study, ok to wait until after COVID 19  -Continue current medications and maintain follow up with specialists.  Return to clinic in 3 months with labs prior.      Verena Amaral MD  [facility]

## 2020-04-16 NOTE — ASSESSMENT & PLAN NOTE
BP controlled on Norvasc 10 mg daily, Lasix, HCTZ 25 mg, Irbesartan 300 mg daily. No CP/SOB. BP elevated up to 200 SBP twice over last few months, called ambulance and it came down. Otherwise BP has been good. Used to take Clonidine PRN.

## 2020-04-23 ENCOUNTER — TELEPHONE (OUTPATIENT)
Dept: FAMILY MEDICINE | Facility: CLINIC | Age: 65
End: 2020-04-23

## 2020-04-23 NOTE — TELEPHONE ENCOUNTER
----- Message from Regis Robison sent at 4/23/2020  3:46 PM CDT -----  Contact: [phone] / self   Patient would like a Z-pack or Antibiotics called in for her sinus , states she is experiencing a nasal drip and bad cough. Pharmacy is [facility] [address]. Please Advise.

## 2020-04-23 NOTE — TELEPHONE ENCOUNTER
Zyrtec, Flonase and Mucinex.  Let me know if not improving over the next week or has fever.      Doesn't need antibiotics for drip.

## 2020-04-29 ENCOUNTER — TELEPHONE (OUTPATIENT)
Dept: FAMILY MEDICINE | Facility: CLINIC | Age: 65
End: 2020-04-29

## 2020-04-29 DIAGNOSIS — I10 ESSENTIAL HYPERTENSION: ICD-10-CM

## 2020-04-29 RX ORDER — HYDROCHLOROTHIAZIDE 25 MG/1
25 TABLET ORAL DAILY
Qty: 90 TABLET | Refills: 3 | Status: SHIPPED | OUTPATIENT
Start: 2020-04-29 | End: 2020-12-11 | Stop reason: SDUPTHER

## 2020-04-29 NOTE — TELEPHONE ENCOUNTER
States she is not sleeping, her blood pressure is getting too high. Said it is 167/98 in the evenings and about 3 hours later her BP is up to 170/98. Highest her BP has gotten was 205/121 last Sunday. States her anxiety gets so bad with her BP getting elevated she has to take her xanax. Said she feels burning in her head when  Her BP get elevated. Said everytime she goes to sleep/takes a nap her BP shoots up and feels burning in her head and then pt wakes up in a panic     She is currently taking irbesartan 300mg 1 po qd, hctz 12.5mg, amlodipine 10mg 1 po qd, clonidine and xanax 0.25mg 1 po tid prn.

## 2020-04-29 NOTE — TELEPHONE ENCOUNTER
----- Message from Daphne Poly sent at 4/29/2020 11:56 AM CDT -----  Contact: self, [phone] or [phone]  Patient requests to speak with Dr. Amaral. States she's unable to keep her blood pressure down. States in the 180s/105s.

## 2020-05-01 ENCOUNTER — TELEPHONE (OUTPATIENT)
Dept: FAMILY MEDICINE | Facility: CLINIC | Age: 65
End: 2020-05-01

## 2020-05-01 NOTE — TELEPHONE ENCOUNTER
Spoke to pt she was concerned because she was coughing her mucus up, explained that is what is suppose to happen, that is what you want to happen. She understood and I tild her to call us if for some reason she starts to feel worse.

## 2020-05-01 NOTE — TELEPHONE ENCOUNTER
----- Message from Dayana Stout sent at 5/1/2020  4:16 PM CDT -----  Contact: patient  Type:  Needs Medical Advice    Who Called: pt  Advice Regarding: congestion, her breathing treatment is not helping  Would the patient rather a call back or a response via [facility]? call  Best Call Back Number: [phone]  Additional Information: Mikie Johnson

## 2020-05-03 ENCOUNTER — HOSPITAL ENCOUNTER (EMERGENCY)
Facility: HOSPITAL | Age: 65
Discharge: HOME | End: 2020-05-03
Attending: EMERGENCY MEDICINE
Payer: MEDICARE

## 2020-05-03 VITALS
OXYGEN SATURATION: 99 % | DIASTOLIC BLOOD PRESSURE: 91 MMHG | TEMPERATURE: 98 F | SYSTOLIC BLOOD PRESSURE: 156 MMHG | WEIGHT: 211 LBS | RESPIRATION RATE: 18 BRPM | HEIGHT: 65 IN | BODY MASS INDEX: 35.16 KG/M2 | HEART RATE: 96 BPM

## 2020-05-03 DIAGNOSIS — I10 ESSENTIAL HYPERTENSION: Primary | ICD-10-CM

## 2020-05-03 DIAGNOSIS — F41.9 ANXIETY: ICD-10-CM

## 2020-05-03 DIAGNOSIS — R05.9 COUGH: ICD-10-CM

## 2020-05-03 PROCEDURE — 99283 EMERGENCY DEPT VISIT LOW MDM: CPT | Mod: HCNC

## 2020-05-03 RX ORDER — BENZONATATE 200 MG/1
200 CAPSULE ORAL 3 TIMES DAILY PRN
Qty: 30 CAPSULE | Refills: 0 | Status: SHIPPED | OUTPATIENT
Start: 2020-05-03 | End: 2020-05-13

## 2020-05-04 ENCOUNTER — TELEPHONE (OUTPATIENT)
Dept: FAMILY MEDICINE | Facility: CLINIC | Age: 65
End: 2020-05-04

## 2020-05-04 DIAGNOSIS — E11.59 HYPERTENSION ASSOCIATED WITH DIABETES: Primary | ICD-10-CM

## 2020-05-04 DIAGNOSIS — I15.2 HYPERTENSION ASSOCIATED WITH DIABETES: Primary | ICD-10-CM

## 2020-05-04 RX ORDER — NEBIVOLOL 10 MG/1
10 TABLET ORAL DAILY
Qty: 90 TABLET | Refills: 3 | Status: SHIPPED | OUTPATIENT
Start: 2020-05-04 | End: 2020-12-18

## 2020-05-04 NOTE — TELEPHONE ENCOUNTER
Adding bystolic 10 mg daily to her BP regimen.  Continue HCTZ, Norvasc, and Irbesartan.  ONLY TAKE CLONIDINE IF BP IS GREATER THAN 170/90.     Call me with BP readings on Friday.

## 2020-05-04 NOTE — TELEPHONE ENCOUNTER
----- Message from Fabio Ramos sent at 5/4/2020  9:49 AM CDT -----  Contact: self [phone]  Patient called in returning your call. Please advise.

## 2020-05-04 NOTE — ED NOTES
Pt reports that she had a SBP of 160 earlier today and reports that she started to panic. Pt has been working with her PCP to bring down her BP. Provider at bedside to discuss pt's BP today. Pt denies any headaches or vision changes at this time.       APPEARANCE: Alert, oriented and in no acute distress.  CARDIAC: Normal rate and rhythm, no murmur heard.   PERIPHERAL VASCULAR: peripheral pulses present. Normal cap refill. No edema. Warm to touch.    RESPIRATORY:Normal rate and effort, breath sounds clear bilaterally throughout chest. Respirations are equal and unlabored no obvious signs of distress.  GASTRO: soft, bowel sounds normal, no tenderness, no abdominal distention.  MUSC: Full ROM. No bony tenderness or soft tissue tenderness. No obvious deformity.  SKIN: Skin is warm and dry, normal skin turgor, mucous membranes moist.  NEURO: 5/5 strength major flexors/extensors bilaterally. Sensory intact to light touch bilaterally. Rabia coma scale: eyes open spontaneously-4, oriented & converses-5, obeys commands-6. No neurological abnormalities.   MENTAL STATUS: awake, alert and aware of environment.

## 2020-05-04 NOTE — ED PROVIDER NOTES
"Encounter Date: 5/3/2020       History     Chief Complaint   Patient presents with    Hypertension     64y F ambulatory to ED with c/o  at home today. pt took her nighttime BP medications pta     Patient is a 64-year-old female with medical history of anxiety, asthma, depression, diabetes, GERD, hypertension, hepatitis-B, seizures presenting to the ED for elevated blood pressure.  Patient states her systolic blood pressure was 166 today.  Patient states she checks her blood pressure multiple times a day when " the top of her head feels hot.  Patient states she took her home clonidine prior to coming to the ED and feels better.  Patient denies any headache, blurred vision, dizziness, chest pain or shortness of breath.    The history is provided by the patient and medical records.     Review of patient's allergies indicates:   Allergen Reactions    Zantac [ranitidine hcl] Nausea And Vomiting    Penicillins     Phenergan [promethazine] Nausea Only     Past Medical History:   Diagnosis Date    Anxiety     Arthritis     Asthma     Back pain     Depression     Diabetes mellitus     Eczema     GERD (gastroesophageal reflux disease)     Hepatitis B     Hyperlipidemia     Hypertension     Seizures      No past surgical history on file.  No family history on file.  Social History     Tobacco Use    Smoking status: Former Smoker     Last attempt to quit: 2019     Years since quittin.9   Substance Use Topics    Alcohol use: No    Drug use: No     Review of Systems   Constitutional: Negative for activity change, appetite change, chills, fatigue and fever.   HENT: Negative for sore throat.    Respiratory: Negative for cough, chest tightness, shortness of breath and wheezing.    Cardiovascular: Negative for chest pain, palpitations and leg swelling.   Gastrointestinal: Negative for abdominal pain and nausea.   Genitourinary: Negative for dysuria.   Musculoskeletal: Negative for back pain.   Skin: " Negative for rash.   Neurological: Negative for dizziness, syncope, weakness, numbness and headaches.   Hematological: Does not bruise/bleed easily.   All other systems reviewed and are negative.      Physical Exam     Initial Vitals [05/03/20 1918]   BP Pulse Resp Temp SpO2   (!) 156/91 96 18 98.1 °F (36.7 °C) 99 %      MAP       --         Physical Exam    Nursing note and vitals reviewed.  Constitutional: Vital signs are normal. She appears well-developed and well-nourished. She is cooperative. No distress.   HENT:   Head: Normocephalic and atraumatic.   Mouth/Throat: Uvula is midline, oropharynx is clear and moist and mucous membranes are normal.   Eyes: Conjunctivae, EOM and lids are normal. Pupils are equal, round, and reactive to light.   Pupils equal round reactive 3-2 mm   Neck: Trachea normal, normal range of motion and phonation normal. Neck supple. No JVD present.   Cardiovascular: Normal rate, regular rhythm and intact distal pulses.   Pulses:       Radial pulses are 2+ on the right side, and 2+ on the left side.   Pulmonary/Chest: Effort normal and breath sounds normal.   Abdominal: Normal appearance.   Neurological: She is alert and oriented to person, place, and time. She has normal strength. No sensory deficit. GCS eye subscore is 4. GCS verbal subscore is 5. GCS motor subscore is 6.   Skin: Skin is warm, dry and intact. Capillary refill takes 2 to 3 seconds. No pallor.         ED Course   Procedures  Labs Reviewed - No data to display       Imaging Results    None          Medical Decision Making:   Initial Assessment:   Emergent evaluation of a 64-year-old female presenting for elevated blood pressure.  Patient states she checked her blood pressure this afternoon and it was elevated.  Patient states she became concerned due to chronic elevation.  According to noted in chart patient has been working with PCP for non controllable hypertension.  Patient denies any dizziness, blurred vision, headache  or chest pain.  On exam patient is A&O x3.  Vital signs stable.  Not febrile and nontoxic appearing.  Pupils equal round reactive.  No JVD noted.  Breath sounds clear bilaterally.  Abdomen soft and nontender.  Cap refill less than 3 sec. No clinical signs of hypertension urgency or emergency.  Differential Diagnosis:   Differential diagnoses include but are not limited to anxiety, hypertension, medication noncompliance.      ED Management:  I do not feel labs or imaging are pertinent for the care this patient.  Patient advised to continue to take her medications as prescribed.  Xanax as needed for anxiety.  Follow-up with PCP tomorrow for elevated blood pressure.  Patient verbalized understanding.  Patient given strict return to ED precautions.  All questions and concerns addressed.    Patient is hemodynamically stable, vital signs are normal. Discharge instructions given. Return to ED precautions discussed. Follow up as directed. Pt verbalized understanding of this plan.  Pt is stable for discharge.                                  Clinical Impression:       ICD-10-CM ICD-9-CM   1. Essential hypertension I10 401.9   2. Cough R05 786.2   3. Anxiety F41.9 300.00         Disposition:   Disposition: Discharged  Condition: Stable     ED Disposition Condition    Discharge Stable        ED Prescriptions     Medication Sig Dispense Start Date End Date Auth. Provider    benzonatate (TESSALON) 200 MG capsule Take 1 capsule (200 mg total) by mouth 3 (three) times daily as needed for Cough. 30 capsule 5/3/2020 5/13/2020 Bernice Mathur NP        Follow-up Information     Follow up With Specialties Details Why Contact Info    Verena Amaral MD Internal Medicine Schedule an appointment as soon as possible for a visit in 3 days If symptoms worsen [id number] [facility]  [address]  [facility] [address]  [phone]                                       Bernice Mathur NP  05/03/20 1933

## 2020-05-04 NOTE — DISCHARGE INSTRUCTIONS
Your blood pressure tonight in the ER is within normal limits.  Please contact your PCP tomorrow for changes to your blood pressure regimen.  Increase rest and fluids.  We have prescribed you medications for your cough that will not affect your blood pressure.  Please follow-up as scheduled.    Our goal in the emergency department is to always give you outstanding care and exceptional service. You may receive a survey by mail or e-mail in the next week regarding your experience in our ED. We would greatly appreciate your completing and returning the survey. Your feedback provides us with a way to recognize our staff who give very good care and it helps us learn how to improve when your experience was below our aspiration of excellence.

## 2020-05-04 NOTE — TELEPHONE ENCOUNTER
Went to [facility] yesterday. Pt states ER told her that you told them to tell her to call back today because you needed to speak to her? Wants to talk to you about her bp.

## 2020-05-06 ENCOUNTER — TELEPHONE (OUTPATIENT)
Dept: FAMILY MEDICINE | Facility: CLINIC | Age: 65
End: 2020-05-06

## 2020-05-06 RX ORDER — ALPRAZOLAM 0.25 MG/1
0.25 TABLET ORAL 3 TIMES DAILY
Qty: 90 TABLET | Refills: 0 | Status: SHIPPED | OUTPATIENT
Start: 2020-05-06 | End: 2020-08-06 | Stop reason: SDUPTHER

## 2020-05-06 NOTE — TELEPHONE ENCOUNTER
----- Message from Harry Brandt sent at 5/6/2020 11:43 AM CDT -----  Contact: pt  Pt called and said her pharmacy will not refill her prescription of     ALPRAZolam (XANAX) 0.25 MG tablet [[id number]]  Because a Dr Bruno Cox on the prescription bottle     Pt does not know who that Dr is or why he is on the prescription bottle    Pt can be reached at [phone]

## 2020-05-08 DIAGNOSIS — I15.2 HYPERTENSION ASSOCIATED WITH DIABETES: ICD-10-CM

## 2020-05-08 DIAGNOSIS — E11.59 HYPERTENSION ASSOCIATED WITH DIABETES: ICD-10-CM

## 2020-05-20 ENCOUNTER — TELEPHONE (OUTPATIENT)
Dept: FAMILY MEDICINE | Facility: CLINIC | Age: 65
End: 2020-05-20

## 2020-05-20 NOTE — TELEPHONE ENCOUNTER
[facility] Am[facility]al [facility] on 5/4/2020?  If she's having shortness of breath, she should be evaluated at urgent care or ER.  If she prefers to wait, please make appt with Dr. Amaral

## 2020-05-20 NOTE — TELEPHONE ENCOUNTER
----- Message from Jodee Aparicio MA sent at 5/20/2020  3:20 PM CDT -----      ----- Message -----  From: Val Chapin  Sent: 5/20/2020   3:04 PM CDT  To: [facility] Staff    Type:  Needs Medical Advice    Who Called: patient  Symptoms (please be specific): her blood pressure is elevated and she is having difficulty breathing; she had to call the paramedics last night  How long has patient had these symptoms:  3 weeks  Would the patient rather a call back or a response via [facility]? call  Best Call Back Number: [phone]  Additional Information:none

## 2020-05-21 ENCOUNTER — PATIENT OUTREACH (OUTPATIENT)
Dept: ADMINISTRATIVE | Facility: OTHER | Age: 65
End: 2020-05-21

## 2020-05-21 ENCOUNTER — OFFICE VISIT (OUTPATIENT)
Dept: FAMILY MEDICINE | Facility: CLINIC | Age: 65
End: 2020-05-21
Payer: MEDICARE

## 2020-05-21 VITALS
TEMPERATURE: 99 F | OXYGEN SATURATION: 97 % | HEIGHT: 65 IN | BODY MASS INDEX: 35.02 KG/M2 | HEART RATE: 86 BPM | SYSTOLIC BLOOD PRESSURE: 114 MMHG | WEIGHT: 210.19 LBS | DIASTOLIC BLOOD PRESSURE: 72 MMHG

## 2020-05-21 DIAGNOSIS — R06.02 SHORTNESS OF BREATH: ICD-10-CM

## 2020-05-21 DIAGNOSIS — R07.9 CHEST PAIN, UNSPECIFIED TYPE: ICD-10-CM

## 2020-05-21 DIAGNOSIS — E11.59 HYPERTENSION ASSOCIATED WITH DIABETES: Primary | ICD-10-CM

## 2020-05-21 DIAGNOSIS — R05.9 COUGH: ICD-10-CM

## 2020-05-21 DIAGNOSIS — I15.2 HYPERTENSION ASSOCIATED WITH DIABETES: Primary | ICD-10-CM

## 2020-05-21 PROCEDURE — 3008F PR BODY MASS INDEX (BMI) DOCUMENTED: ICD-10-PCS | Mod: HCNC,CPTII,S$GLB, | Performed by: INTERNAL MEDICINE

## 2020-05-21 PROCEDURE — 99499 RISK ADDL DX/OHS AUDIT: ICD-10-PCS | Mod: HCNC,S$GLB,, | Performed by: INTERNAL MEDICINE

## 2020-05-21 PROCEDURE — 99214 OFFICE O/P EST MOD 30 MIN: CPT | Mod: HCNC,S$GLB,, | Performed by: INTERNAL MEDICINE

## 2020-05-21 PROCEDURE — 3051F HG A1C>EQUAL 7.0%<8.0%: CPT | Mod: HCNC,CPTII,S$GLB, | Performed by: INTERNAL MEDICINE

## 2020-05-21 PROCEDURE — 3078F PR MOST RECENT DIASTOLIC BLOOD PRESSURE < 80 MM HG: ICD-10-PCS | Mod: HCNC,CPTII,S$GLB, | Performed by: INTERNAL MEDICINE

## 2020-05-21 PROCEDURE — 99999 PR PBB SHADOW E&M-EST. PATIENT-LVL V: CPT | Mod: PBBFAC,HCNC,, | Performed by: INTERNAL MEDICINE

## 2020-05-21 PROCEDURE — 3051F PR MOST RECENT HEMOGLOBIN A1C LEVEL 7.0 - < 8.0%: ICD-10-PCS | Mod: HCNC,CPTII,S$GLB, | Performed by: INTERNAL MEDICINE

## 2020-05-21 PROCEDURE — 3078F DIAST BP <80 MM HG: CPT | Mod: HCNC,CPTII,S$GLB, | Performed by: INTERNAL MEDICINE

## 2020-05-21 PROCEDURE — 3008F BODY MASS INDEX DOCD: CPT | Mod: HCNC,CPTII,S$GLB, | Performed by: INTERNAL MEDICINE

## 2020-05-21 PROCEDURE — 3074F PR MOST RECENT SYSTOLIC BLOOD PRESSURE < 130 MM HG: ICD-10-PCS | Mod: HCNC,CPTII,S$GLB, | Performed by: INTERNAL MEDICINE

## 2020-05-21 PROCEDURE — 3074F SYST BP LT 130 MM HG: CPT | Mod: HCNC,CPTII,S$GLB, | Performed by: INTERNAL MEDICINE

## 2020-05-21 PROCEDURE — 99214 PR OFFICE/OUTPT VISIT, EST, LEVL IV, 30-39 MIN: ICD-10-PCS | Mod: HCNC,S$GLB,, | Performed by: INTERNAL MEDICINE

## 2020-05-21 PROCEDURE — 99999 PR PBB SHADOW E&M-EST. PATIENT-LVL V: ICD-10-PCS | Mod: PBBFAC,HCNC,, | Performed by: INTERNAL MEDICINE

## 2020-05-21 PROCEDURE — 99499 UNLISTED E&M SERVICE: CPT | Mod: HCNC,S$GLB,, | Performed by: INTERNAL MEDICINE

## 2020-05-21 NOTE — PROGRESS NOTES
[facility] Note    Chief Complaint      Chief Complaint   Patient presents with    Follow-up     Pt here for a blood pressure check/ pt has complaints of blood pressure/ pt states that her heart rate has been high/ follow-up on medications/ pt states that she has been having numbness on left side/ chest pain     Breathing Problem     dizziness and shortness of breath/ congested cough/ chest pain      History of Present Illness      Margarita Orourke is a 64 y.o. female who presents today for f/u HTN, chest pain, SOB, cough.  Patient comes to appointment with son.    Patient is taking meds on the following schedule:  2 AM: Norvasc 10 mg (wakes up SOB, BP the other night was 225/100, also took Clonidine)  6 AM: Irbesartan 300 mg  5:30 PM: HCTZ 25 mg  8:00 PM: Bystolic 10 mg     In the past, when she took everything at once, BP dropped too low.  Gets SOB when her BP goes up when she sleeps.  Seen by cardiologist last year when in hospital for PNA.  Had angiogram which showed no blockages at [facility] with Dr. Valdez.     Coughing up lots of phlegm, has been taking Mucinex and drinking lots of water.  Has wheezing on occasion, takes albuterol Has been sneezing, no watery eyes.  No issues with reflux, takes Prilosec daily.  Has not been taking Symbicort every day, using albuterol instead.  Problem List Items Addressed This Visit     Hypertension associated with diabetes - Primary    Current Assessment & Plan     BP controlled today on Norvasc 10 mg daily, Bystolic 10 mg daily, Lasix, HCTZ 25 mg, Irbesartan 300 mg daily. No CP/SOB. Takes Clonidine PRN.           Other Visit Diagnoses     Cough        Relevant Orders    X-Ray Chest PA And Lateral    Chest pain, unspecified type        Shortness of breath              Health Maintenance   Topic Date Due    TETANUS VACCINE  08/02/1973    Pap Smear with HPV Cotest  08/02/1976    Eye Exam  07/10/2020    Hemoglobin A1c  07/21/2020    Lipid Panel   10/08/2020    Foot Exam  2021    Low Dose Statin  2021    Mammogram  2021    Colonoscopy  10/10/2029    Hepatitis C Screening  Completed    Pneumococcal Vaccine (Medium Risk)  Completed       Past Medical History:   Diagnosis Date    Anxiety     Arthritis     Asthma     Back pain     Depression     Diabetes mellitus     Eczema     GERD (gastroesophageal reflux disease)     Hepatitis B     Hyperlipidemia     Hypertension     Seizures        History reviewed. No pertinent surgical history.    family history is not on file.    Social History     Tobacco Use    Smoking status: Former Smoker     Last attempt to quit: 2019     Years since quittin.9   Substance Use Topics    Alcohol use: No    Drug use: No       Review of Systems   Constitutional: Negative for chills and fever.   HENT: Negative for congestion and sore throat.    Eyes: Negative for blurred vision and discharge.   Respiratory: Negative for cough and shortness of breath.    Cardiovascular: Negative for chest pain and palpitations.   Gastrointestinal: Negative for constipation, diarrhea, nausea and vomiting.   Genitourinary: Negative for dysuria and hematuria.   Musculoskeletal: Negative for falls and myalgias.   Skin: Negative for itching and rash.   Neurological: Negative for dizziness and headaches.        Outpatient Encounter Medications as of 2020   Medication Sig Dispense Refill    albuterol (PROVENTIL) 2.5 mg /3 mL (0.083 %) nebulizer solution Take 3 mLs (2.5 mg total) by nebulization every 6 (six) hours as needed for Wheezing. Rescue 90 mL 1    albuterol (PROVENTIL/VENTOLIN HFA) 90 mcg/actuation inhaler INHALE 2 PUFFS INTO THE LUNGS EVERY 6 HOURS AS NEEDED FOR WHEEZING 54 g 3    ALPRAZolam (XANAX) 0.25 MG tablet Take 1 tablet (0.25 mg total) by mouth 3 (three) times daily. 90 tablet 0    amLODIPine (NORVASC) 10 MG tablet Take 1 tablet (10 mg total) by mouth once daily. 90 tablet 3     "budesonide-formoterol 160-4.5 mcg (SYMBICORT) 160-4.5 mcg/actuation HFAA Inhale 2 puffs into the lungs every 12 (twelve) hours. Controller      busPIRone (BUSPAR) 30 MG Tab Take 1 tablet (30 mg total) by mouth 2 (two) times daily. 60 tablet 11    cloNIDine (CATAPRES) 0.1 MG tablet Take 1 tablet (0.1 mg total) by mouth every 6 (six) hours as needed (BP over 170/90). 30 tablet 1    clotrimazole-betamethasone 1-0.05% (LOTRISONE) cream Apply topically 2 (two) times daily. 45 g 3    ergocalciferol (ERGOCALCIFEROL) 50,000 unit Cap TK ONE C PO ONCE WEEKLY  0    folic acid (FOLVITE) 1 MG tablet Take 1 mg by mouth once daily.      furosemide (LASIX) 20 MG tablet TK 1 T PO D PRF SWELLING  3    hydroCHLOROthiazide (HYDRODIURIL) 25 MG tablet Take 1 tablet (25 mg total) by mouth once daily. 90 tablet 3    hydroxychloroquine (PLAQUENIL) 200 mg tablet Take 200 mg by mouth once daily.      ibuprofen (ADVIL,MOTRIN) 600 MG tablet Take 1 tablet (600 mg total) by mouth every 6 (six) hours as needed for Pain. 30 tablet 0    insulin glargine (LANTUS) 100 unit/mL injection Inject 42 Units into the skin every evening. 3 vial 11    insulin glargine 100 units/mL (3mL) SubQ pen Inject 40 Units into the skin once daily. (Patient taking differently: Inject 45 Units into the skin once daily. ) 3 Syringe 11    irbesartan (AVAPRO) 300 MG tablet Take 300 mg by mouth every evening.      methotrexate 2.5 MG Tab Take 20 mg by mouth once a week.  1    nebivoloL (BYSTOLIC) 10 MG Tab Take 1 tablet (10 mg total) by mouth once daily. 90 tablet 3    omeprazole (PRILOSEC) 20 MG capsule Take 20 mg by mouth once daily.      ondansetron (ZOFRAN-ODT) 4 MG TbDL Take 1 tablet (4 mg total) by mouth every 12 (twelve) hours. 30 tablet 3    pen needle, diabetic 32 gauge x 5/32" Ndle 1 Box by Misc.(Non-Drug; Combo Route) route once daily. Use daily with lantus solostar 1 each 3    simvastatin (ZOCOR) 40 MG tablet TK 1 T PO D  3     No " "facility-administered encounter medications on file as of 5/21/2020.         Review of patient's allergies indicates:   Allergen Reactions    Zantac [ranitidine hcl] Nausea And Vomiting    Penicillins     Phenergan [promethazine] Nausea Only       Physical Exam      Vital Signs  Temp: 98.6 °F (37 °C)  Temp src: Oral  Pulse: 86  SpO2: 97 %  BP: 114/72  BP Location: Left arm  Patient Position: Sitting  Pain Score: 0-No pain  Height and Weight  Height: 5' 5" (165.1 cm)  Weight: 95.4 kg (210 lb 3.3 oz)  BSA (Calculated - sq m): 2.09 sq meters  BMI (Calculated): 35  Weight in (lb) to have BMI = 25: 149.9]      Physical Exam   Constitutional: She is oriented to person, place, and time. She appears well-developed and well-nourished.   HENT:   Head: Normocephalic and atraumatic.   Right Ear: External ear normal.   Left Ear: External ear normal.   Eyes: Right eye exhibits no discharge. Left eye exhibits no discharge.   Neck: Normal range of motion. No thyromegaly present.   Cardiovascular: Normal rate, regular rhythm, normal heart sounds and intact distal pulses.   No murmur heard.  Pulmonary/Chest: Effort normal and breath sounds normal. No respiratory distress.   Abdominal: Soft. Bowel sounds are normal. She exhibits no distension. There is no tenderness.   Musculoskeletal: Normal range of motion. She exhibits no deformity.   Neurological: She is alert and oriented to person, place, and time.   Skin: Skin is warm and dry. No rash noted.   Psychiatric: She has a normal mood and affect. Her behavior is normal.        Laboratory:  CBC:  No results for input(s): WBC, RBC, HGB, HCT, PLT, MCV, MCH, MCHC in the last 2160 hours.  CMP:  No results for input(s): GLU, CALCIUM, ALBUMIN, PROT, NA, K, CO2, CL, BUN, ALKPHOS, ALT, AST, BILITOT in the last 2160 hours.    Invalid input(s): CREATININ  URINALYSIS:  No results for input(s): COLORU, CLARITYU, SPECGRAV, PHUR, PROTEINUA, GLUCOSEU, BILIRUBINCON, BLOODU, WBCU, RBCU, BACTERIA, " MUCUS, NITRITE, LEUKOCYTESUR, UROBILINOGEN, HYALINECASTS in the last 2160 hours.   LIPIDS:  No results for input(s): TSH, HDL, CHOL, TRIG, LDLCALC, CHOLHDL, NONHDLCHOL, TOTALCHOLEST in the last 2160 hours.  TSH:  No results for input(s): TSH in the last 2160 hours.  A1C:  No results for input(s): HGBA1C in the last 2160 hours.    Radiology:  CXR  7/2019: Pulmonary infiltrate at the right lung base with appearance suggesting developing confluence    Assessment/Plan     Margarita Orourke is a 64 y.o.female with:    1. Hypertension associated with diabetes    2. Cough  - X-Ray Chest PA And Lateral; Future  - X-Ray Chest PA And Lateral    3. Chest pain, unspecified type    4. Shortness of breath    -Refer back to Dr. Valdez for assistance with CP  -Start antihistamine, concerned that PND is cause of cough.  Given hx of PNA multiple times, will check CXR.  Needs to do Symbicort .  -Continue current medications and maintain follow up with specialists.  Return to clinic in 1 month      Verena Amaral MD  [facility]

## 2020-05-21 NOTE — ASSESSMENT & PLAN NOTE
BP controlled today on Norvasc 10 mg daily, Bystolic 10 mg daily, Lasix, HCTZ 25 mg, Irbesartan 300 mg daily. No CP/SOB. Takes Clonidine PRN.

## 2020-05-25 ENCOUNTER — TELEPHONE (OUTPATIENT)
Dept: FAMILY MEDICINE | Facility: CLINIC | Age: 65
End: 2020-05-25

## 2020-05-25 ENCOUNTER — CLINICAL SUPPORT (OUTPATIENT)
Dept: DIABETES | Facility: CLINIC | Age: 65
End: 2020-05-25
Payer: MEDICARE

## 2020-05-25 DIAGNOSIS — I15.2 HYPERTENSION ASSOCIATED WITH DIABETES: ICD-10-CM

## 2020-05-25 DIAGNOSIS — R05.3 COUGH, PERSISTENT: ICD-10-CM

## 2020-05-25 DIAGNOSIS — E11.59 HYPERTENSION ASSOCIATED WITH DIABETES: ICD-10-CM

## 2020-05-25 PROCEDURE — G0108 PR DIAB MANAGE TRN  PER INDIV: ICD-10-PCS | Mod: HCNC,S$GLB,, | Performed by: DIETITIAN, REGISTERED

## 2020-05-25 PROCEDURE — 99999 PR PBB SHADOW E&M-EST. PATIENT-LVL I: ICD-10-PCS | Mod: PBBFAC,HCNC,, | Performed by: DIETITIAN, REGISTERED

## 2020-05-25 PROCEDURE — G0108 DIAB MANAGE TRN  PER INDIV: HCPCS | Mod: HCNC,S$GLB,, | Performed by: DIETITIAN, REGISTERED

## 2020-05-25 PROCEDURE — 99999 PR PBB SHADOW E&M-EST. PATIENT-LVL I: CPT | Mod: PBBFAC,HCNC,, | Performed by: DIETITIAN, REGISTERED

## 2020-05-25 NOTE — TELEPHONE ENCOUNTER
Patient advised lab orders sent to [facility] said she went on Friday to [facility] to have her CXR.   [facility]

## 2020-05-25 NOTE — TELEPHONE ENCOUNTER
----- Message from Allison Vasquez sent at 5/25/2020 11:13 AM CDT -----  Contact: [phone]/ self  Patient requesting to speak with you regarding orders for blood work. She wants to know where they were sent to. Please call.

## 2020-05-25 NOTE — PROGRESS NOTES
Diabetes Care Specialist Telehealth Visit Note     It was in the patient's best interest to receive diabetes self-management education and support services in this format to prevent the exposure to COVID-19.        Established Patient - Audio Only Telehealth Visit  The patient location is: home   The chief complaint leading to consultation is: Diabetes    Visit type: Virtual visit with audio only (telephone)  Total time spent with patient: 50 min      The reason for the audio only service rather than synchronous audio and video virtual visit was related to technical difficulties or patient preference/necessity.     Each patient to whom I provide medical services by telemedicine is:  (1) informed of the relationship between the physician and patient and the respective role of any other health care provider with respect to management of the patient; and (2) notified that they may decline to receive medical services by telemedicine and may withdraw from such care at any time. Patient verbally consented to receive this service via voice-only telephone call.          Diabetes Education  Author: Claudette Pablo RD  Date: 5/26/2020    Diabetes Care Management Summary  Diabetes Education Record Assessment/Progress: Initial  Current Diabetes Risk Level: Moderate     Last A1c:   Lab Results   Component Value Date    HGBA1C 7.2 (H) 01/21/2020     Patient reports lows. Not taking medications appropriately. Instructed to take as directed. Spoke w/ MD about updated labs; MD ordered. To f/u with patient re medication compliance, labs.       Diabetes Type  Diabetes Type : Type II    Diabetes History  Diabetes Diagnosis: >10 years  Current Treatment: Insulin(lantus 40u in the morning (will take an add'l 20u at night))  Reviewed Problem List with Patient: No    Health Maintenance was reviewed today with patient. Discussed with patient importance of routine eye exams, foot exams/foot care, blood work (i.e.: A1c, microalbumin, and  lipid), dental visits, yearly flu vaccine, and pneumonia vaccine as indicated by PCP. Patient verbalized understanding.     Health Maintenance Topics with due status: Not Due       Topic Last Completion Date    Eye Exam 07/10/2019    Lipid Panel 10/08/2019    Colonoscopy 10/10/2019    Mammogram 12/04/2019    Foot Exam 01/21/2020    Hemoglobin A1c 01/21/2020    Low Dose Statin 05/21/2020     Health Maintenance Due   Topic Date Due    TETANUS VACCINE  08/02/1973    Pap Smear with HPV Cotest  08/02/1976       Nutrition  What type of beverages do you drink?: water  Meal Plan 24 Hour Recall - Breakfast: cheerios  Meal Plan 24 Hour Recall - Lunch: turkey sandwich, salad  Meal Plan 24 Hour Recall - Dinner: baked chicken, peas, mashed potatoes     Monitoring   Self Monitoring : 2-3x/day  Blood Glucose Logs: No  In the last month, how often have you had a low blood sugar reaction?: once  How do you treat low blood sugar?: peppermints, sandwich  Can you tell when your blood sugar is too high?: sometimes    Exercise   Exercise Type: walking  Intensity: Low  Frequency: 3-5 Times per week  Duration: 1 hour    Current Diabetes Treatment   Current Treatment: Insulin(lantus 40u in the morning (will take an add'l 20u at night))    Social History  Preferred Learning Method: Reading Materials  Primary Support: Self                                Barriers to Change  Barriers to Change: None  Learning Challenges : Other(Visit Type)    Readiness to Learn   Readiness to Learn : Acceptance    Cultural Influences  Cultural Influences: None    Diabetes Education Assessment/Progress  Diabetes Disease Process (diabetes disease process and treatment options): Demonstrates Understanding/Competency(verbalizes/demonstrates), Individual Session, Written Materials Provided, Discussion, Comprehends Key Points, Instructed  Nutrition (Incorporating nutritional management into one's lifestyle): Individual Session, Written Materials Provided,  Discussion, Comprehends Key Points, Instructed, Needs Review  Physical Activity (incorporating physical activity into one's lifestyle): Demonstrates Understanding/Competency (verbalizes/demonstrates), Individual Session, Written Materials Provided, Discussion, Comprehends Key Points, Instructed  Medications (states correct name, dose, onset, peak, duration, side effects & timing of meds): Individual Session, Written Materials Provided, Discussion, Comprehends Key Points, Instructed, Needs Review  Monitoring (monitoring blood glucose/other parameters & using results): Demonstrates Understanding/Competency (verbalizes/demonstrates), Individual Session, Written Materials Provided, Discussion, Comprehends Key Points, Instructed  Acute Complications (preventing, detecting, and treating acute complications): Demonstrates Understanding/Competency (verbalizes/demonstrates), Individual Session, Written Materials Provided, Discussion, Comprehends Key Points, Instructed  Chronic Complications (preventing, detecting, and treating chronic complications): Demonstrates Understanding/Competency (verbalizes/demonstrates), Individual Session, Written Materials Provided, Discussion, Comprehends Key Points, Instructed  Clinical (diabetes, other pertinent medical history, and relevant comorbidities reviewed during visit): Discussion, Comprehends Key Points  Cognitive (knowledge of self-management skills, functional health literacy): Discussion, Comprehends Key Points  Psychosocial (emotional response to diabetes): Discussion, Comprehends Key Points  Diabetes Distress and Support Systems: Not Covered/Deferred(Visit Type)  Behavioral (readiness for change, lifestyle practices, self-care behaviors): Discussion, Comprehends Key Points    Goals  Patient has selected/evaluated goals during today's session: Yes, selected  Medications: Set(Patient will take medication as prescribed in order to have bg wnl )  Start Date: 05/25/20          Diabetes Care Plan/Intervention  Education Plan/Intervention: Individual Follow-Up DSMT    Diabetes Meal Plan  Restrictions: Restricted Carbohydrate    Today's Self-Management Care Plan was developed with the patient's input and is based on barriers identified during today's assessment.    The long and short-term goals in the care plan were written with the patient/caregiver's input. The patient has agreed to work toward these goals to improve her overall diabetes control.      The patient received a copy of today's self-management plan and verbalized understanding of the care plan, goals, and all of today's instructions.      The patient was encouraged to communicate with her physician and care team regarding her condition(s) and treatment.  I provided the patient with my contact information today and encouraged her to contact me via phone or patient portal as needed.     Education Units of Time   Time Spent: 60 min

## 2020-05-25 NOTE — PROGRESS NOTES
Received CT chest results from [facility], the radiologist recommended a CT scan to get a better picture as there were some slight abnormalities seen in both lungs.  Will send order to [facility], needs to call them to schedule CT.

## 2020-05-27 LAB
ALBUMIN/CREAT UR: 10 MCG/MG CREAT
ALBUMIN: 4.3 GRAM/DL (ref 3.5–5)
ALP SERPL-CCNC: 91 UNIT/L (ref 38–126)
ALT SERPL W P-5'-P-CCNC: 12 UNIT/L (ref 7–56)
ANION GAP SERPL CALC-SCNC: 17 MEQ/L (ref 9–18)
AST SERPL-CCNC: 14 UNIT/L (ref 7–40)
BASOPHILS ABSOLUTE COUNT: 0.1 K/UL (ref 0–0.2)
BASOPHILS NFR BLD: 1.1 % (ref 0–2)
BILIRUB SERPL-MCNC: 0.4 MG/DL (ref 0–1.2)
BUN BLD-MCNC: 10 MG/DL (ref 7–21)
BUN/CREAT SERPL: 14 RATIO (ref 6–22)
CALC OSMOLALITY: 277 MOSM/KG (ref 275–295)
CALCIUM SERPL-MCNC: 9.6 MG/DL (ref 8.5–10.3)
CHLORIDE SERPL-SCNC: 104 MEQ/L (ref 98–107)
CHOL/HDLC RATIO: 4
CHOLEST SERPL-MSCNC: 175 MG/DL (ref 100–200)
CO2 SERPL-SCNC: 22 MEQ/L (ref 21–31)
CREAT SERPL-MCNC: 0.7 MG/DL (ref 0.5–1)
CREAT UR-MCNC: 61 MG/DL (ref 20–275)
DIFFERENTIAL TYPE: NORMAL
EOSINOPHIL NFR BLD: 1.7 % (ref 0–4)
EOSINOPHILS ABSOLUTE COUNT: 0.1 K/UL (ref 0–0.7)
ERYTHROCYTE [DISTWIDTH] IN BLOOD BY AUTOMATED COUNT: 15 GRAM/DL (ref 12–15.3)
GFR: 84.2 ML/MIN/1.73M2
GLUCOSE SERPL-MCNC: 105 MG/DL (ref 70–100)
HBA1C MFR BLD: 7.3 % (ref 4.5–5.7)
HCT VFR BLD AUTO: 39.1 % (ref 37–47)
HDLC SERPL-MCNC: 50 MG/DL (ref 40–75)
HGB BLD-MCNC: 13.6 GRAM/DL (ref 12–16)
LDLC SERPL CALC-MCNC: 117 MG/DL (ref 0–125)
LYMPHOCYTES %: 33.4 % (ref 15–45)
LYMPHOCYTES ABSOLUTE COUNT: 2.5 K/UL (ref 1–4.2)
MCH RBC QN AUTO: 31.4 PICOGRAM (ref 27–33)
MCHC RBC AUTO-ENTMCNC: 34.7 GRAM/DL (ref 32–36)
MCV RBC AUTO: 90.6 FEMTOLITER (ref 81–99)
MICROALBUMIN UR-MCNC: 0.6 MG/DL
MONOCYTES %: 7.1 % (ref 3–13)
MONOCYTES ABSOLUTE COUNT: 0.5 K/UL (ref 0.1–0.8)
NEUTROPHILS ABSOLUTE COUNT: 4.2 K/UL (ref 2.1–7.6)
NEUTROPHILS RELATIVE PERCENT: 56.7 % (ref 32–80)
NONHDLC SERPL-MCNC: 125 MG/DL (ref 60–125)
PLATELET # BLD AUTO: 238 K/UL (ref 150–350)
PMV BLD AUTO: 8.9 FEMTOLITER (ref 7–10.2)
POTASSIUM SERPL-SCNC: 4.2 MEQ/L (ref 3.5–5)
RBC # BLD AUTO: 4.32 MIL/UL (ref 4.2–5.4)
SODIUM BLD-SCNC: 139 MEQ/L (ref 135–145)
TOTAL PROTEIN: 7.4 GRAM/DL (ref 6.3–8.2)
TRIGL SERPL-MCNC: 48 MG/DL (ref 30–150)
WBC # BLD AUTO: 7.4 K/UL (ref 4.5–11)

## 2020-06-01 ENCOUNTER — TELEPHONE (OUTPATIENT)
Dept: FAMILY MEDICINE | Facility: CLINIC | Age: 65
End: 2020-06-01

## 2020-06-01 NOTE — TELEPHONE ENCOUNTER
----- Message from Cecelia Munoz sent at 6/1/2020  2:35 PM CDT -----  Contact: [facility] Pimentel/[phone] est [phone]  Loren with [facility] is requesting clinical notes for patient. Please fax [phone]

## 2020-06-02 ENCOUNTER — PATIENT OUTREACH (OUTPATIENT)
Dept: ADMINISTRATIVE | Facility: HOSPITAL | Age: 65
End: 2020-06-02

## 2020-06-08 ENCOUNTER — TELEPHONE (OUTPATIENT)
Dept: FAMILY MEDICINE | Facility: CLINIC | Age: 65
End: 2020-06-08

## 2020-06-08 DIAGNOSIS — J44.9 CHRONIC OBSTRUCTIVE PULMONARY DISEASE, UNSPECIFIED COPD TYPE: Primary | ICD-10-CM

## 2020-06-08 RX ORDER — TIOTROPIUM BROMIDE 18 UG/1
18 CAPSULE ORAL; RESPIRATORY (INHALATION) DAILY
Qty: 90 CAPSULE | Refills: 3 | Status: SHIPPED | OUTPATIENT
Start: 2020-06-08 | End: 2020-12-18

## 2020-06-08 NOTE — TELEPHONE ENCOUNTER
Received CT scan from [facility], has some mucous buildup in her lungs. I'm going to add another medication to help her lungs called Spiriva.  Make sure she is still doing Mucinex and is taking her Symbicort BID (was only doing once per day).    I would like her to see a lung specialist, will place referral.

## 2020-06-09 ENCOUNTER — TELEPHONE (OUTPATIENT)
Dept: FAMILY MEDICINE | Facility: CLINIC | Age: 65
End: 2020-06-09

## 2020-06-09 NOTE — TELEPHONE ENCOUNTER
----- Message from Zoila Ang sent at 6/9/2020 11:11 AM CDT -----  Contact: [phone]-self  Patient is requesting a call back concerning having copies of her CT Scan and Xray faxed to [facility] for her [facility]-[phone]. Please call

## 2020-06-17 ENCOUNTER — PATIENT OUTREACH (OUTPATIENT)
Dept: ADMINISTRATIVE | Facility: OTHER | Age: 65
End: 2020-06-17

## 2020-06-18 ENCOUNTER — TELEPHONE (OUTPATIENT)
Dept: DIABETES | Facility: CLINIC | Age: 65
End: 2020-06-18

## 2020-06-18 ENCOUNTER — OFFICE VISIT (OUTPATIENT)
Dept: FAMILY MEDICINE | Facility: CLINIC | Age: 65
End: 2020-06-18
Payer: MEDICARE

## 2020-06-18 VITALS
WEIGHT: 207.44 LBS | DIASTOLIC BLOOD PRESSURE: 72 MMHG | HEART RATE: 74 BPM | TEMPERATURE: 98 F | BODY MASS INDEX: 34.52 KG/M2 | OXYGEN SATURATION: 97 % | SYSTOLIC BLOOD PRESSURE: 124 MMHG

## 2020-06-18 DIAGNOSIS — I15.2 HYPERTENSION ASSOCIATED WITH DIABETES: ICD-10-CM

## 2020-06-18 DIAGNOSIS — E55.9 VITAMIN D DEFICIENCY: ICD-10-CM

## 2020-06-18 DIAGNOSIS — E11.65 TYPE 2 DIABETES MELLITUS WITH HYPERGLYCEMIA, WITH LONG-TERM CURRENT USE OF INSULIN: ICD-10-CM

## 2020-06-18 DIAGNOSIS — F41.9 ANXIETY: ICD-10-CM

## 2020-06-18 DIAGNOSIS — Z79.4 TYPE 2 DIABETES MELLITUS WITH HYPERGLYCEMIA, WITH LONG-TERM CURRENT USE OF INSULIN: ICD-10-CM

## 2020-06-18 DIAGNOSIS — E78.5 HYPERLIPIDEMIA DUE TO TYPE 2 DIABETES MELLITUS: ICD-10-CM

## 2020-06-18 DIAGNOSIS — J44.9 CHRONIC OBSTRUCTIVE PULMONARY DISEASE, UNSPECIFIED COPD TYPE: ICD-10-CM

## 2020-06-18 DIAGNOSIS — E11.59 HYPERTENSION ASSOCIATED WITH DIABETES: ICD-10-CM

## 2020-06-18 DIAGNOSIS — E11.69 HYPERLIPIDEMIA DUE TO TYPE 2 DIABETES MELLITUS: ICD-10-CM

## 2020-06-18 DIAGNOSIS — M06.9 RHEUMATOID ARTHRITIS, INVOLVING UNSPECIFIED SITE, UNSPECIFIED RHEUMATOID FACTOR PRESENCE: ICD-10-CM

## 2020-06-18 PROCEDURE — 3074F SYST BP LT 130 MM HG: CPT | Mod: HCNC,CPTII,S$GLB, | Performed by: INTERNAL MEDICINE

## 2020-06-18 PROCEDURE — 99499 UNLISTED E&M SERVICE: CPT | Mod: HCNC,S$GLB,, | Performed by: INTERNAL MEDICINE

## 2020-06-18 PROCEDURE — 3051F PR MOST RECENT HEMOGLOBIN A1C LEVEL 7.0 - < 8.0%: ICD-10-PCS | Mod: HCNC,CPTII,S$GLB, | Performed by: INTERNAL MEDICINE

## 2020-06-18 PROCEDURE — 3078F PR MOST RECENT DIASTOLIC BLOOD PRESSURE < 80 MM HG: ICD-10-PCS | Mod: HCNC,CPTII,S$GLB, | Performed by: INTERNAL MEDICINE

## 2020-06-18 PROCEDURE — 99214 PR OFFICE/OUTPT VISIT, EST, LEVL IV, 30-39 MIN: ICD-10-PCS | Mod: HCNC,S$GLB,, | Performed by: INTERNAL MEDICINE

## 2020-06-18 PROCEDURE — 3008F PR BODY MASS INDEX (BMI) DOCUMENTED: ICD-10-PCS | Mod: HCNC,CPTII,S$GLB, | Performed by: INTERNAL MEDICINE

## 2020-06-18 PROCEDURE — 99999 PR PBB SHADOW E&M-EST. PATIENT-LVL IV: ICD-10-PCS | Mod: PBBFAC,HCNC,, | Performed by: INTERNAL MEDICINE

## 2020-06-18 PROCEDURE — 3051F HG A1C>EQUAL 7.0%<8.0%: CPT | Mod: HCNC,CPTII,S$GLB, | Performed by: INTERNAL MEDICINE

## 2020-06-18 PROCEDURE — 99999 PR PBB SHADOW E&M-EST. PATIENT-LVL IV: CPT | Mod: PBBFAC,HCNC,, | Performed by: INTERNAL MEDICINE

## 2020-06-18 PROCEDURE — 99499 RISK ADDL DX/OHS AUDIT: ICD-10-PCS | Mod: HCNC,S$GLB,, | Performed by: INTERNAL MEDICINE

## 2020-06-18 PROCEDURE — 3008F BODY MASS INDEX DOCD: CPT | Mod: HCNC,CPTII,S$GLB, | Performed by: INTERNAL MEDICINE

## 2020-06-18 PROCEDURE — 3078F DIAST BP <80 MM HG: CPT | Mod: HCNC,CPTII,S$GLB, | Performed by: INTERNAL MEDICINE

## 2020-06-18 PROCEDURE — 99214 OFFICE O/P EST MOD 30 MIN: CPT | Mod: HCNC,S$GLB,, | Performed by: INTERNAL MEDICINE

## 2020-06-18 PROCEDURE — 3074F PR MOST RECENT SYSTOLIC BLOOD PRESSURE < 130 MM HG: ICD-10-PCS | Mod: HCNC,CPTII,S$GLB, | Performed by: INTERNAL MEDICINE

## 2020-06-18 RX ORDER — VENLAFAXINE HYDROCHLORIDE 75 MG/1
75 CAPSULE, EXTENDED RELEASE ORAL DAILY
Qty: 30 CAPSULE | Refills: 11 | Status: SHIPPED | OUTPATIENT
Start: 2020-06-18 | End: 2020-12-18

## 2020-06-18 NOTE — PROGRESS NOTES
[facility] Note    Chief Complaint      Chief Complaint   Patient presents with    Follow-up     History of Present Illness      Margarita Orourke is a 64 y.o. female who presents today for f/u HTN.  Patient comes to appointment with daughter.      From previous visit:  Patient is taking meds on the following schedule:  2 AM: Norvasc 10 mg (wakes up SOB, BP the other night was 225/100, also took Clonidine)  6 AM: Irbesartan 300 mg  5:30 PM: HCTZ 25 mg  8:00 PM: Bystolic 10 mg     In the past, when she took everything at once, BP dropped too low.  Gets SOB when her BP goes up when she sleeps.  Seen by cardiologist last year when in hospital for PNA.  Had angiogram which showed no blockages at [facility] with Dr. Valdez.     Coughing up lots of phlegm, has been taking Mucinex and drinking lots of water.  Has wheezing on occasion, takes albuterol Has been sneezing, no watery eyes.  No issues with reflux, takes Prilosec daily.  Has not been taking Symbicort every day, using albuterol instead.  Problem List Items Addressed This Visit     Hypertension associated with diabetes    Current Assessment & Plan     BP controlled today on Norvasc 10 mg daily, Bystolic 10 mg daily, Lasix, HCTZ 25 mg, Irbesartan 300 mg daily. No CP/SOB. Takes Clonidine PRN.         Hyperlipidemia due to type 2 diabetes mellitus    Current Assessment & Plan     Stable on Zocor 40 mg daily, no myalgias.         Type 2 diabetes mellitus with hyperglycemia, with long-term current use of insulin    Current Assessment & Plan     A1C 7.3 in 5/2020, stable on lantus 40 units at night.  Lowest glucose has been 100.  AM glucose today 126.         Relevant Orders    Hemoglobin A1C    Comprehensive metabolic panel    Anxiety    Current Assessment & Plan     Has panic attacks on occasion.   Takes 1/2 xanax when she needs it, takes when BP goes up and she panics.  Has had anxiety since she was 17.  Has tried paxil, prozac, lexapro and zoloft  in past; had issues with all of them.         Relevant Medications    venlafaxine (EFFEXOR-XR) 75 MG 24 hr capsule    COPD (chronic obstructive pulmonary disease)    Current Assessment & Plan     Stable on symbicort with albuterol PRN. Mild cough at present, no fever, no SOB.  Spiriva added after last visit, seeing Pulm at .         Rheumatoid arthritis    Current Assessment & Plan     Sees rheum Dr. Yin at .  10/2019 ESR/CRP at  were elevated. On Plaquenil and MTX/folate.  UTD on eye exam.  Worst in hips/knees.         Vitamin D deficiency    Current Assessment & Plan     On supplementation, no issues.         Relevant Orders    Vitamin D          Health Maintenance   Topic Date Due    TETANUS VACCINE  1973    Eye Exam  07/10/2020    Hemoglobin A1c  2020    Foot Exam  2021    Lipid Panel  2021    Low Dose Statin  2021    Mammogram  2021    Hepatitis C Screening  Completed    Pneumococcal Vaccine (Medium Risk)  Completed       Past Medical History:   Diagnosis Date    Anxiety     Arthritis     Asthma     Back pain     Depression     Diabetes mellitus     Eczema     GERD (gastroesophageal reflux disease)     Hepatitis B     Hyperlipidemia     Hypertension     Seizures        No past surgical history on file.    family history is not on file.    Social History     Tobacco Use    Smoking status: Former Smoker     Quit date: 2019     Years since quittin.0   Substance Use Topics    Alcohol use: No    Drug use: No       Review of Systems   Constitutional: Negative for chills and fever.   HENT: Negative for congestion and sore throat.    Eyes: Negative for blurred vision and discharge.   Respiratory: Negative for cough and shortness of breath.    Cardiovascular: Negative for chest pain and palpitations.   Gastrointestinal: Negative for constipation, diarrhea, nausea and vomiting.   Genitourinary: Negative for dysuria and hematuria.    Musculoskeletal: Negative for falls and myalgias.   Skin: Negative for itching and rash.   Neurological: Negative for dizziness and headaches.        Outpatient Encounter Medications as of 6/18/2020   Medication Sig Dispense Refill    albuterol (PROVENTIL) 2.5 mg /3 mL (0.083 %) nebulizer solution Take 3 mLs (2.5 mg total) by nebulization every 6 (six) hours as needed for Wheezing. Rescue 90 mL 1    albuterol (PROVENTIL/VENTOLIN HFA) 90 mcg/actuation inhaler INHALE 2 PUFFS INTO THE LUNGS EVERY 6 HOURS AS NEEDED FOR WHEEZING 54 g 3    ALPRAZolam (XANAX) 0.25 MG tablet Take 1 tablet (0.25 mg total) by mouth 3 (three) times daily. 90 tablet 0    amLODIPine (NORVASC) 10 MG tablet Take 1 tablet (10 mg total) by mouth once daily. 90 tablet 3    budesonide-formoterol 160-4.5 mcg (SYMBICORT) 160-4.5 mcg/actuation HFAA Inhale 2 puffs into the lungs every 12 (twelve) hours. Controller      busPIRone (BUSPAR) 30 MG Tab Take 1 tablet (30 mg total) by mouth 2 (two) times daily. 60 tablet 11    cloNIDine (CATAPRES) 0.1 MG tablet Take 1 tablet (0.1 mg total) by mouth every 6 (six) hours as needed (BP over 170/90). 30 tablet 1    clotrimazole-betamethasone 1-0.05% (LOTRISONE) cream Apply topically 2 (two) times daily. 45 g 3    ergocalciferol (ERGOCALCIFEROL) 50,000 unit Cap TK ONE C PO ONCE WEEKLY  0    folic acid (FOLVITE) 1 MG tablet Take 1 mg by mouth once daily.      furosemide (LASIX) 20 MG tablet TK 1 T PO D PRF SWELLING  3    hydroCHLOROthiazide (HYDRODIURIL) 25 MG tablet Take 1 tablet (25 mg total) by mouth once daily. 90 tablet 3    hydroxychloroquine (PLAQUENIL) 200 mg tablet Take 200 mg by mouth once daily.      ibuprofen (ADVIL,MOTRIN) 600 MG tablet Take 1 tablet (600 mg total) by mouth every 6 (six) hours as needed for Pain. 30 tablet 0    insulin glargine (LANTUS) 100 unit/mL injection Inject 42 Units into the skin every evening. 3 vial 11    insulin glargine 100 units/mL (3mL) SubQ pen Inject 40  "Units into the skin once daily. (Patient taking differently: Inject 45 Units into the skin once daily. ) 3 Syringe 11    irbesartan (AVAPRO) 300 MG tablet Take 300 mg by mouth every evening.      methotrexate 2.5 MG Tab Take 20 mg by mouth once a week.  1    nebivoloL (BYSTOLIC) 10 MG Tab Take 1 tablet (10 mg total) by mouth once daily. 90 tablet 3    omeprazole (PRILOSEC) 20 MG capsule Take 20 mg by mouth once daily.      ondansetron (ZOFRAN-ODT) 4 MG TbDL Take 1 tablet (4 mg total) by mouth every 12 (twelve) hours. 30 tablet 3    pen needle, diabetic 32 gauge x 5/32" Ndle 1 Box by Misc.(Non-Drug; Combo Route) route once daily. Use daily with lantus solostar 1 each 3    simvastatin (ZOCOR) 40 MG tablet TK 1 T PO D  3    tiotropium (SPIRIVA) 18 mcg inhalation capsule Inhale 1 capsule (18 mcg total) into the lungs once daily. Controller 90 capsule 3    venlafaxine (EFFEXOR-XR) 75 MG 24 hr capsule Take 1 capsule (75 mg total) by mouth once daily. 30 capsule 11     No facility-administered encounter medications on file as of 6/18/2020.         Review of patient's allergies indicates:   Allergen Reactions    Zantac [ranitidine hcl] Nausea And Vomiting    Penicillins     Phenergan [promethazine] Nausea Only       Physical Exam      Vital Signs  Temp: 98.2 °F (36.8 °C)  Temp src: Oral  Pulse: 74  SpO2: 97 %  BP: 124/72  BP Location: Left arm  Patient Position: Sitting  Pain Score: 0-No pain  Height and Weight  Weight: 94.1 kg (207 lb 7.3 oz)]  Body mass index is 34.52 kg/m².    Physical Exam  Constitutional:       Appearance: She is well-developed.   HENT:      Head: Normocephalic and atraumatic.      Right Ear: External ear normal.      Left Ear: External ear normal.   Eyes:      General:         Right eye: No discharge.         Left eye: No discharge.   Neck:      Musculoskeletal: Normal range of motion.      Thyroid: No thyromegaly.   Cardiovascular:      Rate and Rhythm: Normal rate and regular rhythm.     "  Heart sounds: Normal heart sounds. No murmur.   Pulmonary:      Effort: Pulmonary effort is normal. No respiratory distress.      Breath sounds: Normal breath sounds.   Abdominal:      General: Bowel sounds are normal. There is no distension.      Palpations: Abdomen is soft.      Tenderness: There is no abdominal tenderness.   Musculoskeletal: Normal range of motion.         General: No deformity.   Skin:     General: Skin is warm and dry.      Findings: No rash.   Neurological:      Mental Status: She is alert and oriented to person, place, and time.   Psychiatric:         Behavior: Behavior normal.          Laboratory:  CBC:  Recent Labs   Lab Result Units 05/26/20  1117   WBC K/UL 7.4   RBC MIL/uL 4.32   Hemoglobin gram/dL 13.6   Hematocrit % 39.1   Platelets K/   Mean Corpuscular Volume Femtoliter 90.6   Mean Corpuscular Hemoglobin Picogram 31.4   Mean Corpuscular Hemoglobin Conc gram/dL 34.7     CMP:  Recent Labs   Lab Result Units 05/26/20  1117   Glucose mg/dL 105*   Calcium mg/dL 9.6   ALBUMIN gram/dL 4.3   Total Protein gram/dL 7.4   Sodium mEq/L 139   Potassium mEq/L 4.2   CO2 mEq/L 22   Chloride mEq/L 104   BUN mg/dL 10   Alkaline Phosphatase unit/L 91   ALT unit/L 12   AST unit/L 14   Total Bilirubin mg/dL 0.4     URINALYSIS:  No results for input(s): COLORU, CLARITYU, SPECGRAV, PHUR, PROTEINUA, GLUCOSEU, BILIRUBINCON, BLOODU, WBCU, RBCU, BACTERIA, MUCUS, NITRITE, LEUKOCYTESUR, UROBILINOGEN, HYALINECASTS in the last 2160 hours.   LIPIDS:  Recent Labs   Lab Result Units 05/26/20  1117   HDL mg/dL 50   Cholesterol mg/dL 175   Triglycerides mg/dL 48   LDL Calculated mg/dL 117   Non-HDL Cholesterol mg/dL 125     TSH:  No results for input(s): TSH in the last 2160 hours.  A1C:  Recent Labs   Lab Result Units 05/26/20  1117   Hemoglobin A1C % 7.3*       Radiology:  CT chest 6/2020:  Bronchiectasis, right lung nodules      Assessment/Plan     Margarita Orourke is a 64 y.o.female with:    1. Type 2 diabetes  mellitus with hyperglycemia, with long-term current use of insulin  - Hemoglobin A1C; Future  - Comprehensive metabolic panel; Future  - Hemoglobin A1C  - Comprehensive metabolic panel    2. Hyperlipidemia due to type 2 diabetes mellitus    3. Chronic obstructive pulmonary disease, unspecified COPD type    4. Hypertension associated with diabetes    5. Vitamin D deficiency  - Vitamin D; Future  - Vitamin D    6. Rheumatoid arthritis, involving unspecified site, unspecified rheumatoid factor presence    7. Anxiety  - venlafaxine (EFFEXOR-XR) 75 MG 24 hr capsule; Take 1 capsule (75 mg total) by mouth once daily.  Dispense: 30 capsule; Refill: 11    -Re-start therapy, will try low dose Effexor  -Seeing Dr. Orellana tomorrow AM.  Feeling better with Mucinex, Spiriva.  -Continue current medications and maintain follow up with specialists.  Return to clinic in 6 months with labs prior at [facility]      Verena Amaral MD  [facility]

## 2020-06-18 NOTE — ASSESSMENT & PLAN NOTE
A1C 7.3 in 5/2020, stable on lantus 40 units at night.  Lowest glucose has been 100.  AM glucose today 126.

## 2020-06-18 NOTE — ASSESSMENT & PLAN NOTE
Has panic attacks on occasion.   Takes 1/2 xanax when she needs it, takes when BP goes up and she panics.  Has had anxiety since she was 17.  Has tried paxil, prozac, lexapro and zoloft in past; had issues with all of them.

## 2020-06-18 NOTE — ASSESSMENT & PLAN NOTE
Stable on symbicort with albuterol PRN. Mild cough at present, no fever, no SOB.  Spiriva added after last visit, seeing Pulm at [facility].

## 2020-07-10 ENCOUNTER — TELEPHONE (OUTPATIENT)
Dept: DIABETES | Facility: CLINIC | Age: 65
End: 2020-07-10

## 2020-07-22 ENCOUNTER — TELEPHONE (OUTPATIENT)
Dept: FAMILY MEDICINE | Facility: CLINIC | Age: 65
End: 2020-07-22

## 2020-07-22 NOTE — TELEPHONE ENCOUNTER
States she is already taking 1/2 of her HCTZ, but only when she is having problems breathing, not every day. Said she knows her symptoms are being caused by the irbesartan and nothing else.

## 2020-07-22 NOTE — TELEPHONE ENCOUNTER
----- Message from Susie Aguilera sent at 7/22/2020 12:02 PM CDT -----  Contact: patient  cell/[phone] or [phone]  Patient wants to speak with you about one of her blood pressure medications. She looked up the side effects and feels that she is having them. She feels too light headed and like she is off balance. It is Irbesartan 300mg.     Pt said that her blood pressure is average around 106/65 . Please call patient about th is.

## 2020-07-22 NOTE — TELEPHONE ENCOUNTER
I don't think it's necessarily the Irbesartan that does this.   It's likely that she is on too much BP medication in general, making BP too low which causes dizziness.    Take 1/2 of HCTZ for the next week.  This one can dehydrate and cause dizziness, it also affects BP.    Call me with BP readings next week.

## 2020-07-22 NOTE — TELEPHONE ENCOUNTER
Pt stated that irbesartan makes her very light headed. Pt stated that her blood pressure drops low (106/65). She gets very dizzy to the point that she feels like she's about to fall. Pt stated that she feels weak and her heart rate has been low (65-68). Pt stated that the irbesartan may be too strong for her. She would like to know what should she do next. Please advise.

## 2020-07-27 RX ORDER — AMLODIPINE BESYLATE 10 MG/1
10 TABLET ORAL DAILY
Qty: 90 TABLET | Refills: 3 | Status: SHIPPED | OUTPATIENT
Start: 2020-07-27 | End: 2020-12-18

## 2020-08-03 ENCOUNTER — HOSPITAL ENCOUNTER (EMERGENCY)
Facility: HOSPITAL | Age: 65
Discharge: HOME | End: 2020-08-03
Attending: EMERGENCY MEDICINE
Payer: MEDICARE

## 2020-08-03 VITALS
OXYGEN SATURATION: 99 % | BODY MASS INDEX: 35 KG/M2 | HEIGHT: 64 IN | SYSTOLIC BLOOD PRESSURE: 141 MMHG | HEART RATE: 90 BPM | DIASTOLIC BLOOD PRESSURE: 77 MMHG | TEMPERATURE: 98 F | RESPIRATION RATE: 18 BRPM | WEIGHT: 205 LBS

## 2020-08-03 DIAGNOSIS — F41.0 ANXIETY ATTACK: Primary | ICD-10-CM

## 2020-08-03 DIAGNOSIS — I10 CHRONIC HYPERTENSION: ICD-10-CM

## 2020-08-03 PROCEDURE — 25000003 PHARM REV CODE 250: Mod: HCNC | Performed by: EMERGENCY MEDICINE

## 2020-08-03 PROCEDURE — 99283 EMERGENCY DEPT VISIT LOW MDM: CPT | Mod: HCNC

## 2020-08-03 RX ORDER — IRBESARTAN 300 MG/1
300 TABLET ORAL NIGHTLY
Qty: 90 TABLET | Refills: 3 | Status: SHIPPED | OUTPATIENT
Start: 2020-08-03 | End: 2020-12-18

## 2020-08-03 RX ORDER — ACETAMINOPHEN 325 MG/1
650 TABLET ORAL
Status: COMPLETED | OUTPATIENT
Start: 2020-08-03 | End: 2020-08-03

## 2020-08-03 RX ADMIN — ACETAMINOPHEN 650 MG: 325 TABLET ORAL at 02:08

## 2020-08-03 NOTE — ED PROVIDER NOTES
"Encounter Date: 8/3/2020    SCRIBE #1 NOTE: I, Renee Ana Luisa, am scribing for, and in the presence of,  Dr. May. I have scribed the entire note.       History     Chief Complaint   Patient presents with    Hypertension     hx of htn. States SBP was 180 20min pta. Took her clonidine pta. Also c/o "burning" headache     Margarita Orourke is a 65 y.o. female who  has a past medical history of Anxiety, Arthritis, Asthma, Back pain, Depression, Diabetes mellitus, Eczema, GERD (gastroesophageal reflux disease), Hepatitis B, Hyperlipidemia, Hypertension, and Seizures.    The patient presents to the ED due to concern for elevated BP, CP, and palpitations. She reports waking up from sleep with her heart racing. She took her BP and a distant was 180 systolic.  She took a dose of clonidine and presented to the ER for evaluation of her elevated blood pressure.  She reports associated mild chest discomfort, as well as a burning headache to the top of her head.  On arrival to ER, she states she feels much better.    Patient reports history panic attacks and notes she went to sleep with multiple recent stressors on her mind, including celebrating her birthday recently without being able to visit with friends or family, as well as recently being told she has an autoimmune disease that is affecting her lungs.  She denies any fever, current chest pain/shortness of breath, vision changes, slurred speech, facial droop, focal weakness/numbness, abdominal pain, nausea/vomiting/diarrhea, urinary complaints, or any other concerns at this time.    The history is provided by the patient. No  was used.     Review of patient's allergies indicates:   Allergen Reactions    Zantac [ranitidine hcl] Nausea And Vomiting    Penicillins     Phenergan [promethazine] Nausea Only     Past Medical History:   Diagnosis Date    Anxiety     Arthritis     Asthma     Back pain     Depression     Diabetes mellitus     Eczema     " GERD (gastroesophageal reflux disease)     Hepatitis B     Hyperlipidemia     Hypertension     Seizures      No past surgical history on file.  No family history on file.  Social History     Tobacco Use    Smoking status: Former Smoker     Quit date: 2019     Years since quittin.1   Substance Use Topics    Alcohol use: No    Drug use: No     Review of Systems   Constitutional: Negative for chills and fever.   HENT: Negative for sore throat.    Respiratory: Negative for cough and shortness of breath.    Cardiovascular: Positive for chest pain and palpitations.   Gastrointestinal: Negative for nausea and vomiting.   Genitourinary: Negative for dysuria, frequency and urgency.   Musculoskeletal: Negative for back pain.   Skin: Negative for rash and wound.   Neurological: Positive for headaches. Negative for syncope and weakness.   Hematological: Does not bruise/bleed easily.   Psychiatric/Behavioral: Negative for agitation, behavioral problems and confusion. The patient is nervous/anxious.        Physical Exam     Initial Vitals [20 0202]   BP Pulse Resp Temp SpO2   (!) 141/77 90 18 97.9 °F (36.6 °C) 99 %      MAP       --         Physical Exam    Nursing note and vitals reviewed.  Constitutional: She appears well-developed and well-nourished. She is not diaphoretic. No distress.   Calm, cooperative, pleasant, no acute distress.   HENT:   Head: Normocephalic and atraumatic.   Mouth/Throat: Oropharynx is clear and moist.   Eyes: EOM are normal. Pupils are equal, round, and reactive to light.   Neck: No tracheal deviation present.   Cardiovascular: Normal rate, regular rhythm, normal heart sounds and intact distal pulses.   Pulmonary/Chest: Breath sounds normal. No stridor. No respiratory distress. She has no wheezes.   Abdominal: Soft. Bowel sounds are normal. She exhibits no distension and no mass. There is no abdominal tenderness.   Musculoskeletal: Normal range of motion. No edema.   Neurological:  She is alert and oriented to person, place, and time. She has normal strength. No cranial nerve deficit or sensory deficit. She exhibits normal muscle tone. GCS eye subscore is 4. GCS verbal subscore is 5. GCS motor subscore is 6.   No focal neuro deficits.   Skin: Skin is warm and dry. Capillary refill takes less than 2 seconds. No pallor.   Psychiatric: She has a normal mood and affect. Her behavior is normal. Thought content normal.         ED Course   Procedures  Labs Reviewed - No data to display       Imaging Results    None          Medical Decision Making:   History:   Old Medical Records: I decided to obtain old medical records.  Old Records Summarized: other records.       <> Summary of Records: Patient called her PCP on 7/22 concerned that BP has been running low. PCP instructed her to take half a dose of HCTZ and Irbesartan and to call her back with BP measurements.   Differential Diagnosis:   Differential Diagnosis includes, but is not limited to:  Hypertensive encephalopathy, CVA/TIA, intracranial hemorrhage, MI/ACS, aortic/carotid artery dissection, AAA, hypertensive nephropathy, medication non-compliance, anxiety, drug abuse/intoxication, essential hypertension    Clinical Tests:   Lab Tests: Reviewed  Radiological Study: Reviewed  Medical Tests: Reviewed  ED Management:  After complete evaluation, including thorough history and physical exam, the patient was found to have asymptomatic elevated blood pressure complicated by anxiety. The patient's symptoms are not consistent with SAH/intracranial bleed. Physical exam is benign without focal weakness, sensory deficit, or cerebellar dysfunction to suggest acute stroke or intracranial mass. There is no meningismus, fever, or other evidence of infection to suggest meningitis/encephalitis. There is no other evidence of end-organ damage consistent with hypertensive urgency/emergency at time of this evaluation. I do not feel further ED evaluation or  intervention is warranted. The patient was counseled extensively on medication compliance and was instructed to follow-up with a PCP for further management of elevated BP.      On re-evaluation, the patient's status has improved.  After complete ED evaluation, clinical impression is most consistent with anxiety, elevated BP, palpitations.  PCP follow-up within 2-3 days was recommended.    After taking into careful account the patient's history, physical exam findings, as well as empirical and objective data obtained throughout ED workup, I feel no emergent medical condition has been identified. No further evaluation or admission was felt to be required, and the patient is stable for discharge from the ED. The patient and any additional family present were updated with test results, overall clinical impression, and recommended further plan of care, including discharge instructions as provided and outpatient follow-up for continued evaluation and management as needed. All questions were answered. The patient expressed understanding and agreed with current plan for discharge and follow-up plan of care. Strict ED return precautions were provided, including return/worsening of current symptoms, new symptoms, or any other concerns.                     ED Course as of Aug 05 0743   Mon Aug 03, 2020   0221 65-year-old female with history of anxiety on Xanax p.r.n., hypertension on multiple medications, presents to ER after she states a panic attack woke her up from sleep.  She checked her blood pressure which ran 180 systolic.  She became concerned, took a dose of clonidine, and presents to the ER for evaluation.  On arrival, blood pressure 140/70, and she states she feels much better.  She endorses a mild, burning headache on the top of her head, but denies any other symptoms.  She denies vision changes, slurred speech, focal weakness/numbness, chest pain, shortness of breath, nausea/vomiting.  Endorses chronic lower  extremity edema and is on Lasix.  She states multiple recent stressors, including a recent birthday, where she was unable to visit any friends or family, as well as being told her autoimmune disease is starting to affect her lungs, and she was recently referred to physical therapy.  She states she went to sleep with these on her mind, which caused her to feel stressed and have a panic attack.  She is calm and cooperative and her exam is benign.  No focal neuro deficits.  She was given Tylenol for her mild headache.  No indication for emergent labs or imaging at this time.  Patient stable for discharge with supportive care.    [SS]      ED Course User Index  [SS] Leroy May MD                Clinical Impression:       ICD-10-CM ICD-9-CM   1. Anxiety attack  F41.0 300.01   2. Chronic hypertension  I10 401.9         Disposition:   Disposition: Discharged  Condition: Stable     ED Disposition Condition    Discharge Stable        ED Prescriptions     None        Follow-up Information     Follow up With Specialties Details Why Contact Info    Verena Amaral MD Internal Medicine Schedule an appointment as soon as possible for a visit   [address] [place] [address]  [address]  [facility] [address]  [phone]                        I, Dr. Leroy May, personally performed the services described in this documentation. All medical record entries made by the scribe were at my direction and in my presence.  I have reviewed the chart and agree that the record reflects my personal performance and is accurate and complete.     Leroy May MD.           Leroy May MD  08/03/20 6640       Leroy May MD  08/05/20 4681

## 2020-08-06 RX ORDER — ALPRAZOLAM 0.25 MG/1
0.25 TABLET ORAL 3 TIMES DAILY
Qty: 90 TABLET | Refills: 1 | Status: SHIPPED | OUTPATIENT
Start: 2020-08-06 | End: 2021-01-13 | Stop reason: SDUPTHER

## 2020-08-10 RX ORDER — CLONIDINE HYDROCHLORIDE 0.1 MG/1
0.1 TABLET ORAL EVERY 6 HOURS PRN
Qty: 30 TABLET | Refills: 1 | Status: SHIPPED | OUTPATIENT
Start: 2020-08-10 | End: 2021-08-10

## 2020-09-03 ENCOUNTER — TELEPHONE (OUTPATIENT)
Dept: FAMILY MEDICINE | Facility: CLINIC | Age: 65
End: 2020-09-03

## 2020-09-03 NOTE — TELEPHONE ENCOUNTER
Try mucinex BID, drink lots of water for mucus    Can do delsym OTC for cough.  Can do antihistamine (zyrtec/claritin) and flonase if having postnasal drip    No abx needed unless fever or worsening SOB/symptoms.

## 2020-09-03 NOTE — TELEPHONE ENCOUNTER
"----- Message from Jana Veras sent at 9/3/2020 12:18 PM CDT -----  Contact: [phone] or [phone]  Would like to get medical advice.  Symptoms (please be specific):  congestion and mucus with chest congestion  How long has patient had these symptoms:  few days   Pharmacy name and phone #:  [id number] - [place] - [address] AT [facility] [address] [phone] (Phone)  [phone] (Fax)  Any drug allergies (copy from chart):      Would the patient rather a call back or a response via [facility]?:  Call back   Comments:  Patient states "[employer]" works for her. Please advise        "

## 2020-09-03 NOTE — TELEPHONE ENCOUNTER
"Pt states she has a chest cold, c/o congestion and "coughing up cold" asking for cipro to be called in. States shes been sick for about 3 days, denies any fever.   "

## 2020-09-08 ENCOUNTER — TELEPHONE (OUTPATIENT)
Dept: FAMILY MEDICINE | Facility: CLINIC | Age: 65
End: 2020-09-08

## 2020-09-08 NOTE — TELEPHONE ENCOUNTER
----- Message from Leta Trejo sent at 9/8/2020  4:13 PM CDT -----  Type:  Needs Medical Advice    Who Called:Self  Reason:Patient tested negative for  Covid last week or so. She then went to visit a friends 5 days ago ans that friend tested positive and now she would like to be tested again. Stated we are only testing patients with symptoms as far as I know   Would the patient rather a call back or a response via [facility]? call  Best Call Back Number: [phone]  Additional Information: none

## 2020-09-09 NOTE — TELEPHONE ENCOUNTER
Only testing symptomatic patients at this time.  Patient should quarantine from others, let us know if having symptoms so we can arrange testing.    Can go to one of the community sites for testing if she desires.

## 2020-09-18 DIAGNOSIS — E11.9 TYPE 2 DIABETES MELLITUS WITHOUT COMPLICATION, UNSPECIFIED WHETHER LONG TERM INSULIN USE: ICD-10-CM

## 2020-09-28 ENCOUNTER — TELEPHONE (OUTPATIENT)
Dept: ADMINISTRATIVE | Facility: HOSPITAL | Age: 65
End: 2020-09-28

## 2020-09-28 ENCOUNTER — PATIENT OUTREACH (OUTPATIENT)
Dept: ADMINISTRATIVE | Facility: HOSPITAL | Age: 65
End: 2020-09-28

## 2020-09-29 ENCOUNTER — TELEPHONE (OUTPATIENT)
Dept: ADMINISTRATIVE | Facility: HOSPITAL | Age: 65
End: 2020-09-29

## 2020-12-04 ENCOUNTER — TELEPHONE (OUTPATIENT)
Dept: ADMINISTRATIVE | Facility: HOSPITAL | Age: 65
End: 2020-12-04

## 2020-12-04 ENCOUNTER — PATIENT OUTREACH (OUTPATIENT)
Dept: ADMINISTRATIVE | Facility: HOSPITAL | Age: 65
End: 2020-12-04

## 2020-12-09 ENCOUNTER — TELEPHONE (OUTPATIENT)
Dept: FAMILY MEDICINE | Facility: CLINIC | Age: 65
End: 2020-12-09

## 2020-12-09 ENCOUNTER — PATIENT OUTREACH (OUTPATIENT)
Dept: ADMINISTRATIVE | Facility: HOSPITAL | Age: 65
End: 2020-12-09

## 2020-12-09 NOTE — TELEPHONE ENCOUNTER
----- Message from Carmen Carranza MA sent at 12/9/2020 10:32 AM CST -----  Pt needs a 2020 refill of Zocor 40 mg, for a [employer]  statin use in Person  with Diabetes. Please advise.

## 2020-12-09 NOTE — TELEPHONE ENCOUNTER
Please call patient to see if she is taking zocor still, if not, needs to get filled ASAP.  Needs to be on this because she has diabetes

## 2020-12-11 DIAGNOSIS — I10 ESSENTIAL HYPERTENSION: ICD-10-CM

## 2020-12-11 RX ORDER — OMEPRAZOLE 20 MG/1
20 CAPSULE, DELAYED RELEASE ORAL DAILY
Qty: 90 CAPSULE | Refills: 1 | Status: SHIPPED | OUTPATIENT
Start: 2020-12-11 | End: 2021-06-17 | Stop reason: SDUPTHER

## 2020-12-11 RX ORDER — HYDROCHLOROTHIAZIDE 25 MG/1
25 TABLET ORAL DAILY
Qty: 90 TABLET | Refills: 1 | Status: SHIPPED | OUTPATIENT
Start: 2020-12-11 | End: 2021-06-17 | Stop reason: SDUPTHER

## 2020-12-11 NOTE — TELEPHONE ENCOUNTER
----- Message from Nasreen Hunter sent at 12/11/2020  2:45 PM CST -----  Type:  RX Refill Request    Who Called: Margarita  Refill or New Rx: refill  RX Name and Strength: omeprazole (PRILOSEC) 20 MG capsule  How is the patient currently taking it? (ex. 1XDay): 1XDay  Is this a 30 day or 90 day RX: 90  Preferred Pharmacy with phone number: [facility] #[id number] - [place] - [address] AT [facility] [phone] (Phone)  [phone] (Fax)  Local or Mail Order: local  Ordering Provider: Dr. Amaral  Would the patient rather a call back or a response via [facility]? Call back  Best Call Back Number: [phone] (mobile) or [phone] (home)  Additional Information: pt says the pharmacy has been trying to get this medication refilled for 3 days      Type:  RX Refill Request    Who Called: Margarita  Refill or New Rx: refill  RX Name and Strength: hydroCHLOROthiazide (HYDRODIURIL) 25 MG tablet  How is the patient currently taking it? (ex. 1XDay): 1XDay  Is this a 30 day or 90 day RX: 90  Preferred Pharmacy with phone number: [facility] #[id number] - [place] - [address] AT [facility] [phone] (Phone)  [phone] (Fax)  Local or Mail Order: local  Ordering Provider: Dr. Amaral  Would the patient rather a call back or a response via [facility]? Call back  Best Call Back Number:  [phone] (mobile) or [phone] (home)  Additional Information: none

## 2020-12-15 ENCOUNTER — TELEPHONE (OUTPATIENT)
Dept: FAMILY MEDICINE | Facility: CLINIC | Age: 65
End: 2020-12-15

## 2020-12-15 NOTE — TELEPHONE ENCOUNTER
----- Message from Sowmya Andersen sent at 12/15/2020  9:48 AM CST -----  Patient has an appt. On Friday, She would like to do an audio visit. She stated she has a lung infection in both of her lungs and was advise to not go out if she does not have to.

## 2020-12-18 ENCOUNTER — CLINICAL SUPPORT (OUTPATIENT)
Dept: FAMILY MEDICINE | Facility: CLINIC | Age: 65
End: 2020-12-18
Attending: INTERNAL MEDICINE
Payer: MEDICARE

## 2020-12-18 ENCOUNTER — OFFICE VISIT (OUTPATIENT)
Dept: FAMILY MEDICINE | Facility: CLINIC | Age: 65
End: 2020-12-18
Payer: MEDICARE

## 2020-12-18 DIAGNOSIS — E55.9 VITAMIN D DEFICIENCY: ICD-10-CM

## 2020-12-18 DIAGNOSIS — E11.65 TYPE 2 DIABETES MELLITUS WITH HYPERGLYCEMIA, WITH LONG-TERM CURRENT USE OF INSULIN: ICD-10-CM

## 2020-12-18 DIAGNOSIS — Z79.4 TYPE 2 DIABETES MELLITUS WITH HYPERGLYCEMIA, WITH LONG-TERM CURRENT USE OF INSULIN: ICD-10-CM

## 2020-12-18 DIAGNOSIS — F41.9 ANXIETY: ICD-10-CM

## 2020-12-18 DIAGNOSIS — J84.10 PULMONARY FIBROSIS: ICD-10-CM

## 2020-12-18 DIAGNOSIS — J42 CHRONIC BRONCHITIS, UNSPECIFIED CHRONIC BRONCHITIS TYPE: ICD-10-CM

## 2020-12-18 DIAGNOSIS — E11.65 TYPE 2 DIABETES MELLITUS WITH HYPERGLYCEMIA, WITH LONG-TERM CURRENT USE OF INSULIN: Primary | ICD-10-CM

## 2020-12-18 DIAGNOSIS — Z79.4 TYPE 2 DIABETES MELLITUS WITH HYPERGLYCEMIA, WITH LONG-TERM CURRENT USE OF INSULIN: Primary | ICD-10-CM

## 2020-12-18 DIAGNOSIS — M06.9 RHEUMATOID ARTHRITIS, INVOLVING UNSPECIFIED SITE, UNSPECIFIED WHETHER RHEUMATOID FACTOR PRESENT: ICD-10-CM

## 2020-12-18 DIAGNOSIS — E11.69 HYPERLIPIDEMIA DUE TO TYPE 2 DIABETES MELLITUS: ICD-10-CM

## 2020-12-18 DIAGNOSIS — Z23 PNEUMOCOCCAL VACCINATION GIVEN: ICD-10-CM

## 2020-12-18 DIAGNOSIS — I15.2 HYPERTENSION ASSOCIATED WITH DIABETES: ICD-10-CM

## 2020-12-18 DIAGNOSIS — E11.59 HYPERTENSION ASSOCIATED WITH DIABETES: ICD-10-CM

## 2020-12-18 DIAGNOSIS — I70.0 ATHEROSCLEROSIS OF AORTA: ICD-10-CM

## 2020-12-18 DIAGNOSIS — E78.5 HYPERLIPIDEMIA DUE TO TYPE 2 DIABETES MELLITUS: ICD-10-CM

## 2020-12-18 PROBLEM — E66.01 SEVERE OBESITY (BMI 35.0-35.9 WITH COMORBIDITY): Status: ACTIVE | Noted: 2020-12-18

## 2020-12-18 PROCEDURE — 1101F PT FALLS ASSESS-DOCD LE1/YR: CPT | Mod: HCNC,CPTII,S$GLB, | Performed by: INTERNAL MEDICINE

## 2020-12-18 PROCEDURE — 92250 DIABETIC EYE SCREENING PHOTO: ICD-10-PCS | Mod: HCNC,TC,S$GLB, | Performed by: INTERNAL MEDICINE

## 2020-12-18 PROCEDURE — 92250 FUNDUS PHOTOGRAPHY W/I&R: CPT | Mod: 26,HCNC,S$GLB, | Performed by: OPTOMETRIST

## 2020-12-18 PROCEDURE — 92250 FUNDUS PHOTOGRAPHY W/I&R: CPT | Mod: HCNC,TC,S$GLB, | Performed by: INTERNAL MEDICINE

## 2020-12-18 PROCEDURE — 90670 PNEUMOCOCCAL CONJUGATE VACCINE 13-VALENT LESS THAN 5YO & GREATER THAN: ICD-10-PCS | Mod: HCNC,S$GLB,, | Performed by: INTERNAL MEDICINE

## 2020-12-18 PROCEDURE — 99499 UNLISTED E&M SERVICE: CPT | Mod: S$GLB,,, | Performed by: INTERNAL MEDICINE

## 2020-12-18 PROCEDURE — 1101F PR PT FALLS ASSESS DOC 0-1 FALLS W/OUT INJ PAST YR: ICD-10-PCS | Mod: HCNC,CPTII,S$GLB, | Performed by: INTERNAL MEDICINE

## 2020-12-18 PROCEDURE — 99999 PR PBB SHADOW E&M-EST. PATIENT-LVL IV: ICD-10-PCS | Mod: PBBFAC,HCNC,, | Performed by: INTERNAL MEDICINE

## 2020-12-18 PROCEDURE — 3078F DIAST BP <80 MM HG: CPT | Mod: HCNC,CPTII,S$GLB, | Performed by: INTERNAL MEDICINE

## 2020-12-18 PROCEDURE — 99214 OFFICE O/P EST MOD 30 MIN: CPT | Mod: 25,HCNC,S$GLB, | Performed by: INTERNAL MEDICINE

## 2020-12-18 PROCEDURE — 3078F PR MOST RECENT DIASTOLIC BLOOD PRESSURE < 80 MM HG: ICD-10-PCS | Mod: HCNC,CPTII,S$GLB, | Performed by: INTERNAL MEDICINE

## 2020-12-18 PROCEDURE — 3288F PR FALLS RISK ASSESSMENT DOCUMENTED: ICD-10-PCS | Mod: HCNC,CPTII,S$GLB, | Performed by: INTERNAL MEDICINE

## 2020-12-18 PROCEDURE — 99499 RISK ADDL DX/OHS AUDIT: ICD-10-PCS | Mod: S$GLB,,, | Performed by: INTERNAL MEDICINE

## 2020-12-18 PROCEDURE — G0009 PNEUMOCOCCAL CONJUGATE VACCINE 13-VALENT LESS THAN 5YO & GREATER THAN: ICD-10-PCS | Mod: HCNC,S$GLB,, | Performed by: INTERNAL MEDICINE

## 2020-12-18 PROCEDURE — 99999 PR PBB SHADOW E&M-EST. PATIENT-LVL IV: CPT | Mod: PBBFAC,HCNC,, | Performed by: INTERNAL MEDICINE

## 2020-12-18 PROCEDURE — 90670 PCV13 VACCINE IM: CPT | Mod: HCNC,S$GLB,, | Performed by: INTERNAL MEDICINE

## 2020-12-18 PROCEDURE — 3074F PR MOST RECENT SYSTOLIC BLOOD PRESSURE < 130 MM HG: ICD-10-PCS | Mod: HCNC,CPTII,S$GLB, | Performed by: INTERNAL MEDICINE

## 2020-12-18 PROCEDURE — 3051F PR MOST RECENT HEMOGLOBIN A1C LEVEL 7.0 - < 8.0%: ICD-10-PCS | Mod: HCNC,CPTII,S$GLB, | Performed by: INTERNAL MEDICINE

## 2020-12-18 PROCEDURE — 3288F FALL RISK ASSESSMENT DOCD: CPT | Mod: HCNC,CPTII,S$GLB, | Performed by: INTERNAL MEDICINE

## 2020-12-18 PROCEDURE — 3074F SYST BP LT 130 MM HG: CPT | Mod: HCNC,CPTII,S$GLB, | Performed by: INTERNAL MEDICINE

## 2020-12-18 PROCEDURE — G0009 ADMIN PNEUMOCOCCAL VACCINE: HCPCS | Mod: HCNC,S$GLB,, | Performed by: INTERNAL MEDICINE

## 2020-12-18 PROCEDURE — 99214 PR OFFICE/OUTPT VISIT, EST, LEVL IV, 30-39 MIN: ICD-10-PCS | Mod: 25,HCNC,S$GLB, | Performed by: INTERNAL MEDICINE

## 2020-12-18 PROCEDURE — 3051F HG A1C>EQUAL 7.0%<8.0%: CPT | Mod: HCNC,CPTII,S$GLB, | Performed by: INTERNAL MEDICINE

## 2020-12-18 PROCEDURE — 92250 DIABETIC EYE SCREENING PHOTO: ICD-10-PCS | Mod: 26,HCNC,S$GLB, | Performed by: OPTOMETRIST

## 2020-12-18 RX ORDER — MYCOPHENOLATE MOFETIL 500 MG/1
500 TABLET ORAL 2 TIMES DAILY
COMMUNITY
End: 2022-09-01

## 2020-12-18 NOTE — PROGRESS NOTES
[facility] Note    Chief Complaint      Chief Complaint   Patient presents with    Follow-up     History of Present Illness      Margarita Orourke is a 65 y.o. female who presents today for f/u HTN.  Patient comes to appointment alone.  Pulm: Dr. Orellana, cardiology: Dr. Valdez    Problem List Items Addressed This Visit     Hypertension associated with diabetes    Current Assessment & Plan     BP controlled today on Norvasc 10 mg daily, Lasix, HCTZ 25 mg. No CP/SOB. Takes Clonidine PRN.  Taken off the Irbesartan and Bystolic.  BP at home has been 120/70's.  Sees Dr. Valdez.         Hyperlipidemia due to type 2 diabetes mellitus    Current Assessment & Plan     Stable on Zocor 40 mg daily, no myalgias.         Type 2 diabetes mellitus with hyperglycemia, with long-term current use of insulin - Primary    Current Assessment & Plan     A1C 7.3 in 5/2020, stable on lantus 40 units at night.  Lowest glucose has been 100.  AM glucose today 126.         Relevant Orders    Diabetic Eye Screening Photo    CBC Auto Differential    Comprehensive Metabolic Panel    Hemoglobin A1C    Anxiety    Current Assessment & Plan     Started on Effexor last visit, felt like it made her hallucinate and stopped.  Also stopped buspar because she felt like it made her depressed.  No longer having issues with depression. Has panic attacks on occasion.   Takes 1/2 xanax when she needs it, takes when BP goes up and she panics.  Has been taking less xanax than before.  Has had anxiety since she was 17.  Has tried paxil, prozac, lexapro and zoloft in past; had issues with all of them.         COPD (chronic obstructive pulmonary disease)    Current Assessment & Plan     Stable on Spiriva, symbicort with albuterol PRN. No cough, no fever, no SOB.  Seeing Pulm at , Dr. rOellana.         Rheumatoid arthritis    Current Assessment & Plan     Sees rheum Dr. Yin at .  On Plaquenil, just changed from MTX  to Cellcept.  Pain comes and goes but seems to be better with this change. UTD on eye exam.  Worst in hips/knees.         Vitamin D deficiency    Current Assessment & Plan     On supplementation, no issues.         Atherosclerosis of aorta    Overview     Media tab 20 [facility] CT Chest:   Some tortuosity and atherosclerotic calcification of the aorta.            Current Assessment & Plan     On ASA and statin.         Pulmonary fibrosis    Current Assessment & Plan     Sees Dr. Orellana. Doing Pulmonary Rehab 3 times per week.  2/2 her RA.  Feels like she is doing better since started.           Other Visit Diagnoses     Pneumococcal vaccination given        Relevant Orders    (In Office Administered) Pneumococcal Conjugate Vaccine (13 Valent) (IM)          Health Maintenance   Topic Date Due    TETANUS VACCINE  1973    Eye Exam  07/10/2020    Pneumococcal Vaccine (65+ Low/Medium Risk) (1 of 2 - PCV13) 2020    Hemoglobin A1c  2020    Lipid Panel  2021    Urine Microalbumin  2021    Mammogram  2021    High Dose Statin  2021    Foot Exam  2021    DEXA SCAN  2022    Hepatitis C Screening  Completed       Past Medical History:   Diagnosis Date    Anxiety     Arthritis     Asthma     Back pain     Depression     Diabetes mellitus     Eczema     GERD (gastroesophageal reflux disease)     Hepatitis B     Hyperlipidemia     Hypertension     Seizures        History reviewed. No pertinent surgical history.    family history is not on file.    Social History     Tobacco Use    Smoking status: Former Smoker     Quit date: 2019     Years since quittin.5   Substance Use Topics    Alcohol use: No    Drug use: No       Review of Systems   Constitutional: Negative for chills and fever.   HENT: Negative for congestion and sore throat.    Eyes: Negative for blurred vision and discharge.   Respiratory: Negative for cough and shortness of breath.     Cardiovascular: Negative for chest pain and palpitations.   Gastrointestinal: Negative for constipation, diarrhea, nausea and vomiting.   Genitourinary: Negative for dysuria and hematuria.   Musculoskeletal: Negative for falls and myalgias.   Skin: Negative for itching and rash.   Neurological: Negative for dizziness and headaches.        Outpatient Encounter Medications as of 12/18/2020   Medication Sig Dispense Refill    albuterol (PROVENTIL) 2.5 mg /3 mL (0.083 %) nebulizer solution Take 3 mLs (2.5 mg total) by nebulization every 6 (six) hours as needed for Wheezing. Rescue 90 mL 1    albuterol (PROVENTIL/VENTOLIN HFA) 90 mcg/actuation inhaler INHALE 2 PUFFS INTO THE LUNGS EVERY 6 HOURS AS NEEDED FOR WHEEZING 54 g 3    ALPRAZolam (XANAX) 0.25 MG tablet Take 1 tablet (0.25 mg total) by mouth 3 (three) times daily. 90 tablet 1    budesonide-formoterol 160-4.5 mcg (SYMBICORT) 160-4.5 mcg/actuation HFAA Inhale 2 puffs into the lungs every 12 (twelve) hours. Controller      cloNIDine (CATAPRES) 0.1 MG tablet Take 1 tablet (0.1 mg total) by mouth every 6 (six) hours as needed (BP over 170/90). 30 tablet 1    clotrimazole-betamethasone 1-0.05% (LOTRISONE) cream Apply topically 2 (two) times daily. 45 g 3    ergocalciferol (ERGOCALCIFEROL) 50,000 unit Cap TK ONE C PO ONCE WEEKLY  0    furosemide (LASIX) 20 MG tablet TK 1 T PO D PRF SWELLING  3    hydroCHLOROthiazide (HYDRODIURIL) 25 MG tablet Take 1 tablet (25 mg total) by mouth once daily. 90 tablet 1    hydroxychloroquine (PLAQUENIL) 200 mg tablet Take 200 mg by mouth once daily.      insulin glargine (LANTUS) 100 unit/mL injection Inject 42 Units into the skin every evening. 3 vial 11    mycophenolate (CELLCEPT) 500 mg Tab Take 500 mg by mouth 2 (two) times daily.      omeprazole (PRILOSEC) 20 MG capsule Take 1 capsule (20 mg total) by mouth once daily. 90 capsule 1    ondansetron (ZOFRAN-ODT) 4 MG TbDL Take 1 tablet (4 mg total) by mouth every 12  "(twelve) hours. 30 tablet 3    pen needle, diabetic 32 gauge x 5/32" Ndle 1 Box by Misc.(Non-Drug; Combo Route) route once daily. Use daily with lantus solostar 1 each 3    simvastatin (ZOCOR) 40 MG tablet TK 1 T PO D  3    [DISCONTINUED] amLODIPine (NORVASC) 10 MG tablet Take 1 tablet (10 mg total) by mouth once daily. (Patient not taking: Reported on 12/18/2020) 90 tablet 3    [DISCONTINUED] busPIRone (BUSPAR) 30 MG Tab Take 1 tablet (30 mg total) by mouth 2 (two) times daily. (Patient not taking: Reported on 12/18/2020) 60 tablet 11    [DISCONTINUED] folic acid (FOLVITE) 1 MG tablet Take 1 mg by mouth once daily.      [DISCONTINUED] ibuprofen (ADVIL,MOTRIN) 600 MG tablet Take 1 tablet (600 mg total) by mouth every 6 (six) hours as needed for Pain. (Patient not taking: Reported on 12/18/2020) 30 tablet 0    [DISCONTINUED] insulin glargine 100 units/mL (3mL) SubQ pen Inject 40 Units into the skin once daily. (Patient not taking: Reported on 12/18/2020) 3 Syringe 11    [DISCONTINUED] irbesartan (AVAPRO) 300 MG tablet Take 1 tablet (300 mg total) by mouth every evening. (Patient not taking: Reported on 12/18/2020) 90 tablet 3    [DISCONTINUED] methotrexate 2.5 MG Tab Take 20 mg by mouth once a week.  1    [DISCONTINUED] nebivoloL (BYSTOLIC) 10 MG Tab Take 1 tablet (10 mg total) by mouth once daily. (Patient not taking: Reported on 12/18/2020) 90 tablet 3    [DISCONTINUED] tiotropium (SPIRIVA) 18 mcg inhalation capsule Inhale 1 capsule (18 mcg total) into the lungs once daily. Controller (Patient not taking: Reported on 12/18/2020) 90 capsule 3    [DISCONTINUED] venlafaxine (EFFEXOR-XR) 75 MG 24 hr capsule Take 1 capsule (75 mg total) by mouth once daily. (Patient not taking: Reported on 12/18/2020) 30 capsule 11     No facility-administered encounter medications on file as of 12/18/2020.         Review of patient's allergies indicates:   Allergen Reactions    Zantac [ranitidine hcl] Nausea And Vomiting "    Penicillins     Phenergan [promethazine] Nausea Only       Physical Exam      Vital Signs  Temp: (P) 97.7 °F (36.5 °C)  Temp src: (P) Temporal  Pulse: (P) 83  SpO2: (P) 99 %  BP: (P) 134/84  BP Location: (P) Left arm  Patient Position: (P) Sitting  Pain Score: (P) 0-No pain  Height and Weight  Weight: (P) 95.5 kg (210 lb 10.4 oz)]  Body mass index is 36.16 kg/m² (pended).    Physical Exam  Constitutional:       Appearance: She is well-developed.   HENT:      Head: Normocephalic and atraumatic.      Right Ear: External ear normal.      Left Ear: External ear normal.   Eyes:      General:         Right eye: No discharge.         Left eye: No discharge.   Neck:      Musculoskeletal: Normal range of motion.      Thyroid: No thyromegaly.   Cardiovascular:      Rate and Rhythm: Normal rate and regular rhythm.      Pulses:           Dorsalis pedis pulses are 2+ on the right side and 2+ on the left side.      Heart sounds: Normal heart sounds. No murmur.   Pulmonary:      Effort: Pulmonary effort is normal. No respiratory distress.      Breath sounds: Normal breath sounds.   Abdominal:      General: Bowel sounds are normal. There is no distension.      Palpations: Abdomen is soft.      Tenderness: There is no abdominal tenderness.   Musculoskeletal: Normal range of motion.         General: No deformity.      Right foot: Normal range of motion. No deformity.      Left foot: Normal range of motion. No deformity.   Feet:      Right foot:      Protective Sensation: 5 sites tested. 5 sites sensed.      Skin integrity: No ulcer or blister.      Left foot:      Protective Sensation: 5 sites tested. 5 sites sensed.      Skin integrity: No ulcer or blister.   Skin:     General: Skin is warm and dry.      Findings: No rash.   Neurological:      Mental Status: She is alert and oriented to person, place, and time.   Psychiatric:         Behavior: Behavior normal.          Laboratory:  CBC:  No results for input(s): WBC, RBC, HGB,  HCT, PLT, MCV, MCH, MCHC in the last 2160 hours.  CMP:  No results for input(s): GLU, CALCIUM, ALBUMIN, PROT, NA, K, CO2, CL, BUN, ALKPHOS, ALT, AST, BILITOT in the last 2160 hours.    Invalid input(s): CREATININ  URINALYSIS:  No results for input(s): COLORU, CLARITYU, SPECGRAV, PHUR, PROTEINUA, GLUCOSEU, BILIRUBINCON, BLOODU, WBCU, RBCU, BACTERIA, MUCUS, NITRITE, LEUKOCYTESUR, UROBILINOGEN, HYALINECASTS in the last 2160 hours.   LIPIDS:  No results for input(s): TSH, HDL, CHOL, TRIG, LDLCALC, CHOLHDL, NONHDLCHOL, TOTALCHOLEST in the last 2160 hours.  TSH:  No results for input(s): TSH in the last 2160 hours.  A1C:  No results for input(s): HGBA1C in the last 2160 hours.    Radiology:  No new imaging on file    Assessment/Plan     Margarita Orourke is a 65 y.o.female with:    1. Type 2 diabetes mellitus with hyperglycemia, with long-term current use of insulin  - Diabetic Eye Screening Photo; Future  - CBC Auto Differential; Future  - Comprehensive Metabolic Panel; Future  - Hemoglobin A1C; Future  - CBC Auto Differential  - Comprehensive Metabolic Panel  - Hemoglobin A1C    2. Hypertension associated with diabetes    3. Hyperlipidemia due to type 2 diabetes mellitus    4. Chronic bronchitis, unspecified chronic bronchitis type    5. Anxiety    6. Vitamin D deficiency    7. Rheumatoid arthritis, involving unspecified site, unspecified whether rheumatoid factor present    8. Atherosclerosis of aorta    9. Pulmonary fibrosis    10. Pneumococcal vaccination given  - (In Office Administered) Pneumococcal Conjugate Vaccine (13 Valent) (IM)       -Counseled on continuing pulm rehab exercise and working on diet  -eye camera today  -labs ordered for this visit and sent to [facility], did not do prior to visit  -prevnar today  -Continue current medications and maintain follow up with specialists.  Return to clinic in 6 months with labs prior at [facility]      Verena Amaral MD  [facility] [place]

## 2020-12-18 NOTE — ASSESSMENT & PLAN NOTE
Sees Dr. Orellana. Doing Pulmonary Rehab 3 times per week.  2/2 her RA.  Feels like she is doing better since started.

## 2020-12-18 NOTE — PROGRESS NOTES
Margarita Orourke is a 65 y.o. female here for a diabetic eye screening with non-dilated fundus photos per Dr. Verena Amaral.    Patient cooperative?: Yes  Small pupils?: Yes  Last eye exam:     For exam results, see Encounter Report.

## 2020-12-18 NOTE — ASSESSMENT & PLAN NOTE
Sees rheum Dr. Yin at .  On Plaquenil, just changed from MTX to Cellcept.  Pain comes and goes but seems to be better with this change. UTD on eye exam.  Worst in hips/knees.

## 2020-12-18 NOTE — ASSESSMENT & PLAN NOTE
BP controlled today on Norvasc 10 mg daily, Lasix, HCTZ 25 mg. No CP/SOB. Takes Clonidine PRN.  Taken off the Irbesartan and Bystolic.  BP at home has been 120/70's.  Sees Dr. Valdez.

## 2020-12-18 NOTE — ASSESSMENT & PLAN NOTE
Stable on Spiriva, symbicort with albuterol PRN. No cough, no fever, no SOB.  Seeing Pulm at , Dr. Orellana.

## 2020-12-18 NOTE — ASSESSMENT & PLAN NOTE
Started on Effexor last visit, felt like it made her hallucinate and stopped.  Also stopped buspar because she felt like it made her depressed.  No longer having issues with depression. Has panic attacks on occasion.   Takes 1/2 xanax when she needs it, takes when BP goes up and she panics.  Has been taking less xanax than before.  Has had anxiety since she was 17.  Has tried paxil, prozac, lexapro and zoloft in past; had issues with all of them.

## 2020-12-21 DIAGNOSIS — Z79.4 TYPE 2 DIABETES MELLITUS WITH HYPERGLYCEMIA, WITH LONG-TERM CURRENT USE OF INSULIN: Primary | ICD-10-CM

## 2020-12-21 DIAGNOSIS — E11.65 TYPE 2 DIABETES MELLITUS WITH HYPERGLYCEMIA, WITH LONG-TERM CURRENT USE OF INSULIN: Primary | ICD-10-CM

## 2020-12-21 NOTE — PROGRESS NOTES
Needs to see eye doctor for full exam, has some changes to the eyes from long standing high BP.  Referral placed.

## 2021-01-05 ENCOUNTER — TELEPHONE (OUTPATIENT)
Dept: OPHTHALMOLOGY | Facility: CLINIC | Age: 66
End: 2021-01-05

## 2021-01-06 ENCOUNTER — TELEPHONE (OUTPATIENT)
Dept: FAMILY MEDICINE | Facility: CLINIC | Age: 66
End: 2021-01-06

## 2021-01-06 RX ORDER — AZITHROMYCIN 250 MG/1
TABLET, FILM COATED ORAL
Qty: 6 TABLET | Refills: 0 | Status: SHIPPED | OUTPATIENT
Start: 2021-01-06 | End: 2021-01-11

## 2021-01-13 RX ORDER — ALPRAZOLAM 0.25 MG/1
0.25 TABLET ORAL 3 TIMES DAILY
Qty: 90 TABLET | Refills: 1 | Status: SHIPPED | OUTPATIENT
Start: 2021-01-13 | End: 2021-07-20

## 2021-01-22 ENCOUNTER — TELEPHONE (OUTPATIENT)
Dept: OPHTHALMOLOGY | Facility: CLINIC | Age: 66
End: 2021-01-22

## 2021-01-28 LAB
ALBUMIN: 4.3 GRAM/DL (ref 3.5–5)
ALP SERPL-CCNC: 84 UNIT/L (ref 38–126)
ALT SERPL W P-5'-P-CCNC: 12 UNIT/L (ref 7–56)
ANION GAP SERPL CALC-SCNC: 12 MEQ/L (ref 9–18)
AST SERPL-CCNC: 15 UNIT/L (ref 7–40)
BASOPHILS ABSOLUTE COUNT: 0.1 K/UL (ref 0–0.2)
BASOPHILS NFR BLD: 1 % (ref 0–2)
BILIRUB SERPL-MCNC: 0.3 MG/DL (ref 0–1.2)
BUN BLD-MCNC: 10 MG/DL (ref 7–21)
BUN/CREAT SERPL: 14 RATIO (ref 6–22)
CALC OSMOLALITY: 274 MOSM/KG (ref 275–295)
CALCIUM SERPL-MCNC: 10 MG/DL (ref 8.5–10.3)
CHLORIDE SERPL-SCNC: 97 MEQ/L (ref 98–107)
CO2 SERPL-SCNC: 32 MEQ/L (ref 21–31)
CREAT SERPL-MCNC: 0.7 MG/DL (ref 0.5–1)
DIFFERENTIAL TYPE: NORMAL
EOSINOPHIL NFR BLD: 2.5 % (ref 0–4)
EOSINOPHILS ABSOLUTE COUNT: 0.2 K/UL (ref 0–0.7)
ERYTHROCYTE [DISTWIDTH] IN BLOOD BY AUTOMATED COUNT: 14.4 GRAM/DL (ref 12–15.3)
GFR: 80 ML/MIN/1.73M2
GLUCOSE SERPL-MCNC: 115 MG/DL (ref 70–100)
HBA1C MFR BLD: 7.5 % (ref 4.5–5.7)
HCT VFR BLD AUTO: 43 % (ref 37–47)
HGB BLD-MCNC: 14.6 GRAM/DL (ref 12–16)
LYMPHOCYTES %: 41.1 % (ref 15–45)
LYMPHOCYTES ABSOLUTE COUNT: 2.8 K/UL (ref 1–4.2)
MCH RBC QN AUTO: 30 PICOGRAM (ref 27–33)
MCHC RBC AUTO-ENTMCNC: 34.1 GRAM/DL (ref 32–36)
MCV RBC AUTO: 88 FEMTOLITER (ref 81–99)
MONOCYTES %: 9.8 % (ref 3–13)
MONOCYTES ABSOLUTE COUNT: 0.7 K/UL (ref 0.1–0.8)
NEUTROPHILS ABSOLUTE COUNT: 3.1 K/UL (ref 2.1–7.6)
NEUTROPHILS RELATIVE PERCENT: 45.6 % (ref 32–80)
PLATELET # BLD AUTO: 270 K/UL (ref 150–350)
PMV BLD AUTO: 8.8 FEMTOLITER (ref 7–10.2)
POTASSIUM SERPL-SCNC: 4 MEQ/L (ref 3.5–5)
RBC # BLD AUTO: 4.89 MIL/UL (ref 4.2–5.4)
SODIUM BLD-SCNC: 137 MEQ/L (ref 135–145)
TOTAL PROTEIN: 7.6 GRAM/DL (ref 6.3–8.2)
WBC # BLD AUTO: 6.9 K/UL (ref 4.5–11)

## 2021-01-29 NOTE — PROGRESS NOTES
A1C slightly worse at 7.5.  Increase Lantus dose by 5 units. Needs to eat low carb diet and exercise, goal A1C is <7.

## 2021-02-02 ENCOUNTER — TELEPHONE (OUTPATIENT)
Dept: FAMILY MEDICINE | Facility: CLINIC | Age: 66
End: 2021-02-02

## 2021-03-15 ENCOUNTER — TELEPHONE (OUTPATIENT)
Dept: FAMILY MEDICINE | Facility: CLINIC | Age: 66
End: 2021-03-15

## 2021-03-15 DIAGNOSIS — R22.2 LUMP IN CHEST: Primary | ICD-10-CM

## 2021-03-19 ENCOUNTER — HOSPITAL ENCOUNTER (OUTPATIENT)
Dept: RADIOLOGY | Facility: HOSPITAL | Age: 66
Discharge: HOME | End: 2021-03-19
Attending: INTERNAL MEDICINE
Payer: MEDICARE

## 2021-03-19 DIAGNOSIS — R22.2 LUMP IN CHEST: ICD-10-CM

## 2021-03-19 PROCEDURE — 76705 ECHO EXAM OF ABDOMEN: CPT | Mod: 26,,, | Performed by: RADIOLOGY

## 2021-03-19 PROCEDURE — 76705 ECHO EXAM OF ABDOMEN: CPT | Mod: TC

## 2021-03-19 PROCEDURE — 76705 US SOFT TISSUE, ABDOMEN: ICD-10-PCS | Mod: 26,,, | Performed by: RADIOLOGY

## 2021-03-23 ENCOUNTER — TELEPHONE (OUTPATIENT)
Dept: FAMILY MEDICINE | Facility: CLINIC | Age: 66
End: 2021-03-23

## 2021-04-12 RX ORDER — PEN NEEDLE, DIABETIC 30 GX3/16"
1 NEEDLE, DISPOSABLE MISCELLANEOUS DAILY
Qty: 1 EACH | Refills: 3 | Status: SHIPPED | OUTPATIENT
Start: 2021-04-12 | End: 2021-06-17 | Stop reason: SDUPTHER

## 2021-04-20 ENCOUNTER — TELEPHONE (OUTPATIENT)
Dept: FAMILY MEDICINE | Facility: CLINIC | Age: 66
End: 2021-04-20

## 2021-06-03 ENCOUNTER — PATIENT OUTREACH (OUTPATIENT)
Dept: ADMINISTRATIVE | Facility: HOSPITAL | Age: 66
End: 2021-06-03

## 2021-06-03 DIAGNOSIS — Z79.4 TYPE 2 DIABETES MELLITUS WITH HYPERGLYCEMIA, WITH LONG-TERM CURRENT USE OF INSULIN: Primary | ICD-10-CM

## 2021-06-03 DIAGNOSIS — Z12.31 VISIT FOR SCREENING MAMMOGRAM: Primary | ICD-10-CM

## 2021-06-03 DIAGNOSIS — E11.65 TYPE 2 DIABETES MELLITUS WITH HYPERGLYCEMIA, WITH LONG-TERM CURRENT USE OF INSULIN: Primary | ICD-10-CM

## 2021-06-09 ENCOUNTER — TELEPHONE (OUTPATIENT)
Dept: FAMILY MEDICINE | Facility: CLINIC | Age: 66
End: 2021-06-09

## 2021-06-09 ENCOUNTER — TELEPHONE (OUTPATIENT)
Dept: ADMINISTRATIVE | Facility: HOSPITAL | Age: 66
End: 2021-06-09

## 2021-06-09 ENCOUNTER — PATIENT OUTREACH (OUTPATIENT)
Dept: ADMINISTRATIVE | Facility: HOSPITAL | Age: 66
End: 2021-06-09

## 2021-06-09 DIAGNOSIS — E11.69 HYPERLIPIDEMIA DUE TO TYPE 2 DIABETES MELLITUS: ICD-10-CM

## 2021-06-09 DIAGNOSIS — E78.5 HYPERLIPIDEMIA DUE TO TYPE 2 DIABETES MELLITUS: ICD-10-CM

## 2021-06-09 DIAGNOSIS — E78.5 HYPERLIPIDEMIA DUE TO TYPE 2 DIABETES MELLITUS: Primary | ICD-10-CM

## 2021-06-09 DIAGNOSIS — E11.69 HYPERLIPIDEMIA DUE TO TYPE 2 DIABETES MELLITUS: Primary | ICD-10-CM

## 2021-06-17 ENCOUNTER — OFFICE VISIT (OUTPATIENT)
Dept: FAMILY MEDICINE | Facility: CLINIC | Age: 66
End: 2021-06-17
Payer: MEDICARE

## 2021-06-17 VITALS
SYSTOLIC BLOOD PRESSURE: 122 MMHG | WEIGHT: 213.63 LBS | OXYGEN SATURATION: 95 % | TEMPERATURE: 99 F | DIASTOLIC BLOOD PRESSURE: 80 MMHG | BODY MASS INDEX: 36.67 KG/M2 | HEART RATE: 80 BPM

## 2021-06-17 DIAGNOSIS — J42 CHRONIC BRONCHITIS, UNSPECIFIED CHRONIC BRONCHITIS TYPE: ICD-10-CM

## 2021-06-17 DIAGNOSIS — Z79.4 TYPE 2 DIABETES MELLITUS WITH HYPERGLYCEMIA, WITH LONG-TERM CURRENT USE OF INSULIN: ICD-10-CM

## 2021-06-17 DIAGNOSIS — E11.65 TYPE 2 DIABETES MELLITUS WITH HYPERGLYCEMIA, WITH LONG-TERM CURRENT USE OF INSULIN: ICD-10-CM

## 2021-06-17 DIAGNOSIS — E78.5 HYPERLIPIDEMIA DUE TO TYPE 2 DIABETES MELLITUS: ICD-10-CM

## 2021-06-17 DIAGNOSIS — M06.9 RHEUMATOID ARTHRITIS, INVOLVING UNSPECIFIED SITE, UNSPECIFIED WHETHER RHEUMATOID FACTOR PRESENT: ICD-10-CM

## 2021-06-17 DIAGNOSIS — E11.69 HYPERLIPIDEMIA DUE TO TYPE 2 DIABETES MELLITUS: ICD-10-CM

## 2021-06-17 DIAGNOSIS — E11.59 HYPERTENSION ASSOCIATED WITH DIABETES: ICD-10-CM

## 2021-06-17 DIAGNOSIS — E55.9 VITAMIN D DEFICIENCY: ICD-10-CM

## 2021-06-17 DIAGNOSIS — E66.01 SEVERE OBESITY (BMI 35.0-35.9 WITH COMORBIDITY): ICD-10-CM

## 2021-06-17 DIAGNOSIS — F51.01 PRIMARY INSOMNIA: ICD-10-CM

## 2021-06-17 DIAGNOSIS — J84.10 PULMONARY FIBROSIS: ICD-10-CM

## 2021-06-17 DIAGNOSIS — I10 ESSENTIAL HYPERTENSION: ICD-10-CM

## 2021-06-17 DIAGNOSIS — Z12.31 ENCOUNTER FOR SCREENING MAMMOGRAM FOR MALIGNANT NEOPLASM OF BREAST: Primary | ICD-10-CM

## 2021-06-17 DIAGNOSIS — F41.9 ANXIETY: ICD-10-CM

## 2021-06-17 DIAGNOSIS — I70.0 ATHEROSCLEROSIS OF AORTA: ICD-10-CM

## 2021-06-17 DIAGNOSIS — I15.2 HYPERTENSION ASSOCIATED WITH DIABETES: ICD-10-CM

## 2021-06-17 PROCEDURE — 3288F PR FALLS RISK ASSESSMENT DOCUMENTED: ICD-10-PCS | Mod: CPTII,S$GLB,, | Performed by: INTERNAL MEDICINE

## 2021-06-17 PROCEDURE — 99999 PR PBB SHADOW E&M-EST. PATIENT-LVL III: CPT | Mod: PBBFAC,,, | Performed by: INTERNAL MEDICINE

## 2021-06-17 PROCEDURE — 1101F PT FALLS ASSESS-DOCD LE1/YR: CPT | Mod: CPTII,S$GLB,, | Performed by: INTERNAL MEDICINE

## 2021-06-17 PROCEDURE — 3008F BODY MASS INDEX DOCD: CPT | Mod: CPTII,S$GLB,, | Performed by: INTERNAL MEDICINE

## 2021-06-17 PROCEDURE — 1101F PR PT FALLS ASSESS DOC 0-1 FALLS W/OUT INJ PAST YR: ICD-10-PCS | Mod: CPTII,S$GLB,, | Performed by: INTERNAL MEDICINE

## 2021-06-17 PROCEDURE — 1126F AMNT PAIN NOTED NONE PRSNT: CPT | Mod: S$GLB,,, | Performed by: INTERNAL MEDICINE

## 2021-06-17 PROCEDURE — 99999 PR PBB SHADOW E&M-EST. PATIENT-LVL III: ICD-10-PCS | Mod: PBBFAC,,, | Performed by: INTERNAL MEDICINE

## 2021-06-17 PROCEDURE — 1126F PR PAIN SEVERITY QUANTIFIED, NO PAIN PRESENT: ICD-10-PCS | Mod: S$GLB,,, | Performed by: INTERNAL MEDICINE

## 2021-06-17 PROCEDURE — 99499 UNLISTED E&M SERVICE: CPT | Mod: S$GLB,,, | Performed by: INTERNAL MEDICINE

## 2021-06-17 PROCEDURE — 99499 RISK ADDL DX/OHS AUDIT: ICD-10-PCS | Mod: S$GLB,,, | Performed by: INTERNAL MEDICINE

## 2021-06-17 PROCEDURE — 3008F PR BODY MASS INDEX (BMI) DOCUMENTED: ICD-10-PCS | Mod: CPTII,S$GLB,, | Performed by: INTERNAL MEDICINE

## 2021-06-17 PROCEDURE — 3288F FALL RISK ASSESSMENT DOCD: CPT | Mod: CPTII,S$GLB,, | Performed by: INTERNAL MEDICINE

## 2021-06-17 PROCEDURE — 3051F PR MOST RECENT HEMOGLOBIN A1C LEVEL 7.0 - < 8.0%: ICD-10-PCS | Mod: CPTII,S$GLB,, | Performed by: INTERNAL MEDICINE

## 2021-06-17 PROCEDURE — 99214 PR OFFICE/OUTPT VISIT, EST, LEVL IV, 30-39 MIN: ICD-10-PCS | Mod: S$GLB,,, | Performed by: INTERNAL MEDICINE

## 2021-06-17 PROCEDURE — 99214 OFFICE O/P EST MOD 30 MIN: CPT | Mod: S$GLB,,, | Performed by: INTERNAL MEDICINE

## 2021-06-17 PROCEDURE — 3051F HG A1C>EQUAL 7.0%<8.0%: CPT | Mod: CPTII,S$GLB,, | Performed by: INTERNAL MEDICINE

## 2021-06-17 RX ORDER — INSULIN GLARGINE 100 [IU]/ML
42 INJECTION, SOLUTION SUBCUTANEOUS DAILY
Qty: 39 ML | Refills: 3 | Status: SHIPPED | OUTPATIENT
Start: 2021-06-17 | End: 2022-03-23 | Stop reason: SDUPTHER

## 2021-06-17 RX ORDER — PEN NEEDLE, DIABETIC 30 GX3/16"
1 NEEDLE, DISPOSABLE MISCELLANEOUS DAILY
Qty: 90 EACH | Refills: 3 | Status: SHIPPED | OUTPATIENT
Start: 2021-06-17 | End: 2022-07-20 | Stop reason: SDUPTHER

## 2021-06-17 RX ORDER — OMEPRAZOLE 20 MG/1
20 CAPSULE, DELAYED RELEASE ORAL DAILY
Qty: 90 CAPSULE | Refills: 3 | Status: SHIPPED | OUTPATIENT
Start: 2021-06-17 | End: 2023-09-01 | Stop reason: SDUPTHER

## 2021-06-17 RX ORDER — SIMVASTATIN 40 MG/1
40 TABLET, FILM COATED ORAL NIGHTLY
Qty: 90 TABLET | Refills: 3 | Status: SHIPPED | OUTPATIENT
Start: 2021-06-17 | End: 2023-03-02

## 2021-06-17 RX ORDER — HYDROCHLOROTHIAZIDE 25 MG/1
25 TABLET ORAL DAILY
Qty: 90 TABLET | Refills: 3 | Status: SHIPPED | OUTPATIENT
Start: 2021-06-17 | End: 2022-07-11 | Stop reason: SDUPTHER

## 2021-06-17 RX ORDER — ERGOCALCIFEROL 1.25 MG/1
50000 CAPSULE ORAL
Qty: 12 CAPSULE | Refills: 3 | Status: SHIPPED | OUTPATIENT
Start: 2021-06-17 | End: 2022-09-01 | Stop reason: SDUPTHER

## 2021-06-17 RX ORDER — FUROSEMIDE 20 MG/1
TABLET ORAL
Qty: 90 TABLET | Refills: 3 | Status: SHIPPED | OUTPATIENT
Start: 2021-06-17 | End: 2022-02-23

## 2021-06-24 DIAGNOSIS — Z79.4 TYPE 2 DIABETES MELLITUS WITH HYPERGLYCEMIA, WITH LONG-TERM CURRENT USE OF INSULIN: Primary | ICD-10-CM

## 2021-06-24 DIAGNOSIS — E11.69 HYPERLIPIDEMIA DUE TO TYPE 2 DIABETES MELLITUS: ICD-10-CM

## 2021-06-24 DIAGNOSIS — E78.5 HYPERLIPIDEMIA DUE TO TYPE 2 DIABETES MELLITUS: ICD-10-CM

## 2021-06-24 DIAGNOSIS — E11.65 TYPE 2 DIABETES MELLITUS WITH HYPERGLYCEMIA, WITH LONG-TERM CURRENT USE OF INSULIN: Primary | ICD-10-CM

## 2021-06-24 LAB
25(OH)D3 SERPL-MCNC: 23 NG/ML (ref 30–100)
ALBUMIN: 4.3 GRAM/DL (ref 3.5–5)
ALP SERPL-CCNC: 76 UNIT/L (ref 38–126)
ALT SERPL W P-5'-P-CCNC: 11 UNIT/L (ref 7–56)
ANION GAP SERPL CALC-SCNC: 16 MEQ/L (ref 9–18)
AST SERPL-CCNC: 13 UNIT/L (ref 7–40)
BASOPHILS ABSOLUTE COUNT: 0.1 K/UL (ref 0–0.2)
BASOPHILS NFR BLD: 1.1 % (ref 0–2)
BILIRUB SERPL-MCNC: 0.4 MG/DL (ref 0–1.2)
BUN BLD-MCNC: 11 MG/DL (ref 7–21)
BUN/CREAT SERPL: 16 RATIO (ref 6–22)
CALC OSMOLALITY: 280 MOSM/KG (ref 275–295)
CALCIUM SERPL-MCNC: 9.6 MG/DL (ref 8.5–10.3)
CHLORIDE SERPL-SCNC: 101 MEQ/L (ref 98–107)
CHOL/HDLC RATIO: 4
CHOLEST SERPL-MSCNC: 189 MG/DL (ref 100–200)
CO2 SERPL-SCNC: 27 MEQ/L (ref 21–31)
CREAT SERPL-MCNC: 0.7 MG/DL (ref 0.5–1)
DIFFERENTIAL TYPE: NORMAL
EOSINOPHIL NFR BLD: 2 % (ref 0–4)
EOSINOPHILS ABSOLUTE COUNT: 0.1 K/UL (ref 0–0.7)
ERYTHROCYTE [DISTWIDTH] IN BLOOD BY AUTOMATED COUNT: 14.1 % (ref 12–15.3)
GFR: 88.3 ML/MIN/1.73M2
GLUCOSE SERPL-MCNC: 127 MG/DL (ref 70–100)
HBA1C MFR BLD: 7.6 % (ref 4.5–5.7)
HCT VFR BLD AUTO: 41.1 % (ref 37–47)
HDLC SERPL-MCNC: 50 MG/DL (ref 40–75)
HGB BLD-MCNC: 14 GRAM/DL (ref 12–16)
LDLC SERPL CALC-MCNC: 129 MG/DL (ref 0–125)
LYMPHOCYTES %: 39 % (ref 15–45)
LYMPHOCYTES ABSOLUTE COUNT: 2.4 K/UL (ref 1–4.2)
MCH RBC QN AUTO: 30.1 PICOGRAM (ref 27–33)
MCHC RBC AUTO-ENTMCNC: 34.1 GRAM/DL (ref 32–36)
MCV RBC AUTO: 88.1 FEMTOLITER (ref 81–99)
MONOCYTES %: 7.7 % (ref 3–13)
MONOCYTES ABSOLUTE COUNT: 0.5 K/UL (ref 0.1–0.8)
NEUTROPHILS ABSOLUTE COUNT: 3 K/UL (ref 2.1–7.6)
NEUTROPHILS RELATIVE PERCENT: 50.2 % (ref 32–80)
NONHDLC SERPL-MCNC: 139 MG/DL (ref 60–125)
PLATELET # BLD AUTO: 237 K/UL (ref 150–350)
PMV BLD AUTO: 8.7 FEMTOLITER (ref 7–10.2)
POTASSIUM SERPL-SCNC: 4.1 MEQ/L (ref 3.5–5)
RANDOM URINE: 59.2 MG/DL
RANDOM URINE: <1.2 MG/DL
RANDOM URINE: <20 MCG/MG CREAT (ref 0–29)
RBC # BLD AUTO: 4.66 MIL/UL (ref 4.2–5.4)
SODIUM BLD-SCNC: 140 MEQ/L (ref 135–145)
TOTAL PROTEIN: 7.4 GRAM/DL (ref 6.3–8.2)
TRIGL SERPL-MCNC: 64 MG/DL (ref 30–150)
WBC # BLD AUTO: 6.1 K/UL (ref 4.5–11)

## 2021-06-24 RX ORDER — ROSUVASTATIN CALCIUM 20 MG/1
20 TABLET, COATED ORAL DAILY
Qty: 90 TABLET | Refills: 3 | Status: SHIPPED | OUTPATIENT
Start: 2021-06-24 | End: 2023-09-01 | Stop reason: SDUPTHER

## 2021-07-20 RX ORDER — ALPRAZOLAM 0.25 MG/1
TABLET ORAL
Qty: 90 TABLET | Refills: 3 | Status: SHIPPED | OUTPATIENT
Start: 2021-07-20 | End: 2022-02-23 | Stop reason: SDUPTHER

## 2021-08-13 ENCOUNTER — TELEPHONE (OUTPATIENT)
Dept: FAMILY MEDICINE | Facility: CLINIC | Age: 66
End: 2021-08-13

## 2021-08-13 RX ORDER — AZITHROMYCIN 250 MG/1
TABLET, FILM COATED ORAL
Qty: 6 TABLET | Refills: 0 | Status: SHIPPED | OUTPATIENT
Start: 2021-08-13 | End: 2021-08-18

## 2021-08-20 ENCOUNTER — LAB VISIT (OUTPATIENT)
Dept: PRIMARY CARE CLINIC | Facility: OTHER | Age: 66
End: 2021-08-20
Payer: MEDICARE

## 2021-08-20 DIAGNOSIS — R05.9 COUGH: ICD-10-CM

## 2021-08-20 PROCEDURE — U0003 INFECTIOUS AGENT DETECTION BY NUCLEIC ACID (DNA OR RNA); SEVERE ACUTE RESPIRATORY SYNDROME CORONAVIRUS 2 (SARS-COV-2) (CORONAVIRUS DISEASE [COVID-19]), AMPLIFIED PROBE TECHNIQUE, MAKING USE OF HIGH THROUGHPUT TECHNOLOGIES AS DESCRIBED BY CMS-2020-01-R: HCPCS | Performed by: INTERNAL MEDICINE

## 2021-08-23 ENCOUNTER — PES CALL (OUTPATIENT)
Dept: ADMINISTRATIVE | Facility: CLINIC | Age: 66
End: 2021-08-23

## 2021-08-23 LAB
SARS-COV-2 RNA RESP QL NAA+PROBE: NOT DETECTED
SARS-COV-2- CYCLE NUMBER: NORMAL

## 2021-09-13 ENCOUNTER — OFFICE VISIT (OUTPATIENT)
Dept: URGENT CARE | Facility: CLINIC | Age: 66
End: 2021-09-13
Payer: MEDICARE

## 2021-09-13 ENCOUNTER — TELEPHONE (OUTPATIENT)
Dept: INTERNAL MEDICINE | Facility: CLINIC | Age: 66
End: 2021-09-13

## 2021-09-13 VITALS
RESPIRATION RATE: 16 BRPM | DIASTOLIC BLOOD PRESSURE: 84 MMHG | WEIGHT: 213 LBS | OXYGEN SATURATION: 95 % | SYSTOLIC BLOOD PRESSURE: 154 MMHG | HEIGHT: 64 IN | TEMPERATURE: 98 F | BODY MASS INDEX: 36.37 KG/M2 | HEART RATE: 79 BPM

## 2021-09-13 DIAGNOSIS — Z11.52 ENCOUNTER FOR SCREENING LABORATORY TESTING FOR COVID-19 VIRUS: Primary | ICD-10-CM

## 2021-09-13 LAB
CTP QC/QA: YES
SARS-COV-2 RDRP RESP QL NAA+PROBE: NEGATIVE

## 2021-09-13 PROCEDURE — 99499 UNLISTED E&M SERVICE: CPT | Mod: S$GLB,,, | Performed by: PHYSICIAN ASSISTANT

## 2021-09-13 PROCEDURE — 99203 PR OFFICE/OUTPT VISIT, NEW, LEVL III, 30-44 MIN: ICD-10-PCS | Mod: S$GLB,,, | Performed by: PHYSICIAN ASSISTANT

## 2021-09-13 PROCEDURE — 3079F PR MOST RECENT DIASTOLIC BLOOD PRESSURE 80-89 MM HG: ICD-10-PCS | Mod: CPTII,S$GLB,, | Performed by: PHYSICIAN ASSISTANT

## 2021-09-13 PROCEDURE — U0002: ICD-10-PCS | Mod: QW,S$GLB,, | Performed by: PHYSICIAN ASSISTANT

## 2021-09-13 PROCEDURE — 3051F PR MOST RECENT HEMOGLOBIN A1C LEVEL 7.0 - < 8.0%: ICD-10-PCS | Mod: CPTII,S$GLB,, | Performed by: PHYSICIAN ASSISTANT

## 2021-09-13 PROCEDURE — 3008F PR BODY MASS INDEX (BMI) DOCUMENTED: ICD-10-PCS | Mod: CPTII,S$GLB,, | Performed by: PHYSICIAN ASSISTANT

## 2021-09-13 PROCEDURE — U0002 COVID-19 LAB TEST NON-CDC: HCPCS | Mod: QW,S$GLB,, | Performed by: PHYSICIAN ASSISTANT

## 2021-09-13 PROCEDURE — 99499 RISK ADDL DX/OHS AUDIT: ICD-10-PCS | Mod: S$GLB,,, | Performed by: PHYSICIAN ASSISTANT

## 2021-09-13 PROCEDURE — 1125F AMNT PAIN NOTED PAIN PRSNT: CPT | Mod: CPTII,S$GLB,, | Performed by: PHYSICIAN ASSISTANT

## 2021-09-13 PROCEDURE — 3008F BODY MASS INDEX DOCD: CPT | Mod: CPTII,S$GLB,, | Performed by: PHYSICIAN ASSISTANT

## 2021-09-13 PROCEDURE — 3051F HG A1C>EQUAL 7.0%<8.0%: CPT | Mod: CPTII,S$GLB,, | Performed by: PHYSICIAN ASSISTANT

## 2021-09-13 PROCEDURE — 1160F RVW MEDS BY RX/DR IN RCRD: CPT | Mod: CPTII,S$GLB,, | Performed by: PHYSICIAN ASSISTANT

## 2021-09-13 PROCEDURE — 3077F SYST BP >= 140 MM HG: CPT | Mod: CPTII,S$GLB,, | Performed by: PHYSICIAN ASSISTANT

## 2021-09-13 PROCEDURE — 1125F PR PAIN SEVERITY QUANTIFIED, PAIN PRESENT: ICD-10-PCS | Mod: CPTII,S$GLB,, | Performed by: PHYSICIAN ASSISTANT

## 2021-09-13 PROCEDURE — 3077F PR MOST RECENT SYSTOLIC BLOOD PRESSURE >= 140 MM HG: ICD-10-PCS | Mod: CPTII,S$GLB,, | Performed by: PHYSICIAN ASSISTANT

## 2021-09-13 PROCEDURE — 1159F MED LIST DOCD IN RCRD: CPT | Mod: CPTII,S$GLB,, | Performed by: PHYSICIAN ASSISTANT

## 2021-09-13 PROCEDURE — 1159F PR MEDICATION LIST DOCUMENTED IN MEDICAL RECORD: ICD-10-PCS | Mod: CPTII,S$GLB,, | Performed by: PHYSICIAN ASSISTANT

## 2021-09-13 PROCEDURE — 1160F PR REVIEW ALL MEDS BY PRESCRIBER/CLIN PHARMACIST DOCUMENTED: ICD-10-PCS | Mod: CPTII,S$GLB,, | Performed by: PHYSICIAN ASSISTANT

## 2021-09-13 PROCEDURE — 99203 OFFICE O/P NEW LOW 30 MIN: CPT | Mod: S$GLB,,, | Performed by: PHYSICIAN ASSISTANT

## 2021-09-13 PROCEDURE — 3079F DIAST BP 80-89 MM HG: CPT | Mod: CPTII,S$GLB,, | Performed by: PHYSICIAN ASSISTANT

## 2021-09-13 RX ORDER — CLONIDINE HYDROCHLORIDE 0.1 MG/1
0.1 TABLET ORAL
COMMUNITY
Start: 2020-06-05 | End: 2021-11-05 | Stop reason: SDUPTHER

## 2021-09-13 RX ORDER — INSULIN GLARGINE 100 [IU]/ML
INJECTION, SOLUTION SUBCUTANEOUS
COMMUNITY
Start: 2020-06-19 | End: 2022-02-23

## 2021-09-13 RX ORDER — LATANOPROST 50 UG/ML
SOLUTION/ DROPS OPHTHALMIC
COMMUNITY
Start: 2021-06-28

## 2021-09-13 RX ORDER — TIMOLOL MALEATE 5 MG/ML
1 SOLUTION OPHTHALMIC NIGHTLY
COMMUNITY
Start: 2021-03-19

## 2021-09-13 RX ORDER — TIOTROPIUM BROMIDE 18 UG/1
CAPSULE ORAL; RESPIRATORY (INHALATION)
COMMUNITY
Start: 2020-06-19

## 2021-09-21 ENCOUNTER — TELEPHONE (OUTPATIENT)
Dept: FAMILY MEDICINE | Facility: CLINIC | Age: 66
End: 2021-09-21

## 2021-10-04 ENCOUNTER — TELEPHONE (OUTPATIENT)
Dept: FAMILY MEDICINE | Facility: CLINIC | Age: 66
End: 2021-10-04

## 2021-10-05 ENCOUNTER — PATIENT MESSAGE (OUTPATIENT)
Dept: ADMINISTRATIVE | Facility: HOSPITAL | Age: 66
End: 2021-10-05

## 2021-10-22 ENCOUNTER — TELEPHONE (OUTPATIENT)
Dept: FAMILY MEDICINE | Facility: CLINIC | Age: 66
End: 2021-10-22

## 2021-10-22 RX ORDER — IRBESARTAN 150 MG/1
150 TABLET ORAL NIGHTLY
Qty: 90 TABLET | Refills: 3 | Status: SHIPPED | OUTPATIENT
Start: 2021-10-22 | End: 2022-02-23

## 2021-11-05 RX ORDER — CLONIDINE HYDROCHLORIDE 0.1 MG/1
0.1 TABLET ORAL DAILY PRN
Qty: 30 TABLET | Refills: 1 | Status: SHIPPED | OUTPATIENT
Start: 2021-11-05 | End: 2022-09-01 | Stop reason: SDUPTHER

## 2021-12-07 ENCOUNTER — TELEPHONE (OUTPATIENT)
Dept: FAMILY MEDICINE | Facility: CLINIC | Age: 66
End: 2021-12-07
Payer: MEDICARE

## 2021-12-10 ENCOUNTER — OFFICE VISIT (OUTPATIENT)
Dept: URGENT CARE | Facility: CLINIC | Age: 66
End: 2021-12-10
Payer: MEDICARE

## 2021-12-10 VITALS
OXYGEN SATURATION: 95 % | HEART RATE: 71 BPM | HEIGHT: 64 IN | BODY MASS INDEX: 36.54 KG/M2 | SYSTOLIC BLOOD PRESSURE: 157 MMHG | TEMPERATURE: 98 F | DIASTOLIC BLOOD PRESSURE: 96 MMHG | RESPIRATION RATE: 17 BRPM | WEIGHT: 214 LBS

## 2021-12-10 DIAGNOSIS — J44.9 CHRONIC OBSTRUCTIVE PULMONARY DISEASE, UNSPECIFIED COPD TYPE: ICD-10-CM

## 2021-12-10 DIAGNOSIS — R05.9 COUGH: ICD-10-CM

## 2021-12-10 DIAGNOSIS — J06.9 UPPER RESPIRATORY VIRUS: Primary | ICD-10-CM

## 2021-12-10 LAB
CTP QC/QA: YES
CTP QC/QA: YES
POC MOLECULAR INFLUENZA A AGN: NEGATIVE
POC MOLECULAR INFLUENZA B AGN: NEGATIVE
SARS-COV-2 RDRP RESP QL NAA+PROBE: NEGATIVE

## 2021-12-10 PROCEDURE — 99213 OFFICE O/P EST LOW 20 MIN: CPT | Mod: S$GLB,,, | Performed by: PHYSICIAN ASSISTANT

## 2021-12-10 PROCEDURE — 99499 UNLISTED E&M SERVICE: CPT | Mod: S$GLB,,, | Performed by: PHYSICIAN ASSISTANT

## 2021-12-10 PROCEDURE — 4010F PR ACE/ARB THEARPY RXD/TAKEN: ICD-10-PCS | Mod: CPTII,S$GLB,, | Performed by: PHYSICIAN ASSISTANT

## 2021-12-10 PROCEDURE — 99499 RISK ADDL DX/OHS AUDIT: ICD-10-PCS | Mod: S$GLB,,, | Performed by: PHYSICIAN ASSISTANT

## 2021-12-10 PROCEDURE — 87502 POCT INFLUENZA A/B MOLECULAR: ICD-10-PCS | Mod: QW,S$GLB,, | Performed by: PHYSICIAN ASSISTANT

## 2021-12-10 PROCEDURE — U0002: ICD-10-PCS | Mod: QW,S$GLB,, | Performed by: PHYSICIAN ASSISTANT

## 2021-12-10 PROCEDURE — 4010F ACE/ARB THERAPY RXD/TAKEN: CPT | Mod: CPTII,S$GLB,, | Performed by: PHYSICIAN ASSISTANT

## 2021-12-10 PROCEDURE — 99213 PR OFFICE/OUTPT VISIT, EST, LEVL III, 20-29 MIN: ICD-10-PCS | Mod: S$GLB,,, | Performed by: PHYSICIAN ASSISTANT

## 2021-12-10 PROCEDURE — U0002 COVID-19 LAB TEST NON-CDC: HCPCS | Mod: QW,S$GLB,, | Performed by: PHYSICIAN ASSISTANT

## 2021-12-10 PROCEDURE — 87502 INFLUENZA DNA AMP PROBE: CPT | Mod: QW,S$GLB,, | Performed by: PHYSICIAN ASSISTANT

## 2021-12-10 RX ORDER — AZITHROMYCIN 250 MG/1
TABLET, FILM COATED ORAL
Qty: 6 TABLET | Refills: 0 | Status: SHIPPED | OUTPATIENT
Start: 2021-12-10 | End: 2022-02-23

## 2021-12-12 RX ORDER — BLOOD-GLUCOSE CONTROL, NORMAL
EACH MISCELLANEOUS
Qty: 300 EACH | Refills: 0 | OUTPATIENT
Start: 2021-12-12

## 2021-12-23 ENCOUNTER — HOSPITAL ENCOUNTER (EMERGENCY)
Facility: HOSPITAL | Age: 66
Discharge: HOME | End: 2021-12-23
Attending: EMERGENCY MEDICINE
Payer: MEDICARE

## 2021-12-23 VITALS
RESPIRATION RATE: 18 BRPM | OXYGEN SATURATION: 97 % | BODY MASS INDEX: 36.54 KG/M2 | SYSTOLIC BLOOD PRESSURE: 187 MMHG | TEMPERATURE: 98 F | DIASTOLIC BLOOD PRESSURE: 88 MMHG | HEIGHT: 64 IN | HEART RATE: 84 BPM | WEIGHT: 214 LBS

## 2021-12-23 DIAGNOSIS — Z20.822 COVID-19 VIRUS NOT DETECTED: Primary | ICD-10-CM

## 2021-12-23 PROCEDURE — U0002 COVID-19 LAB TEST NON-CDC: HCPCS | Performed by: PHYSICIAN ASSISTANT

## 2021-12-23 PROCEDURE — 99282 EMERGENCY DEPT VISIT SF MDM: CPT | Mod: 25

## 2021-12-23 NOTE — ED PROVIDER NOTES
Encounter Date: 2021       History     Chief Complaint   Patient presents with    COVID-19 Concerns     + exposure, pt complains of cough, + vaccinated      66-year-old female presents to ED with concern of COVID after possible exposure, requesting a COVID test.  She does report mild cough at reported baseline.  Denies any other associated symptoms.  No fevers, chills, nausea, vomiting, headaches, body aches, sore throat, nasal congestion, chest pain, shortness of breath, abdominal pain, diarrhea, loss in taste or smell.  She reports she is otherwise feeling well at this time with no other acute complaints.    The history is provided by the patient.     Review of patient's allergies indicates:   Allergen Reactions    Zantac [ranitidine hcl] Nausea And Vomiting    Amlodipine     Hydralazine     Ondansetron hcl Other (See Comments)    Penicillins     Phenergan [promethazine] Nausea Only     Past Medical History:   Diagnosis Date    Anxiety     Arthritis     Asthma     Back pain     Depression     Diabetes mellitus     Eczema     GERD (gastroesophageal reflux disease)     Hepatitis B     Hyperlipidemia     Hypertension     Seizures      No past surgical history on file.  No family history on file.  Social History     Tobacco Use    Smoking status: Former Smoker     Quit date: 2019     Years since quittin.5    Smokeless tobacco: Never Used   Substance Use Topics    Alcohol use: No    Drug use: No     Review of Systems   Constitutional: Negative for appetite change, chills and fever.   HENT: Negative for congestion and sore throat.    Respiratory: Positive for cough. Negative for shortness of breath.    Cardiovascular: Negative for chest pain.   Gastrointestinal: Negative for abdominal pain, diarrhea, nausea and vomiting.   Neurological: Negative for headaches.       Physical Exam     Initial Vitals [21 0949]   BP Pulse Resp Temp SpO2   (!) 187/88 84 18 98.2 °F (36.8 °C) 97 %       MAP       --         Physical Exam    Nursing note and vitals reviewed.  Constitutional: Vital signs are normal. She appears well-developed and well-nourished. She is cooperative. She does not have a sickly appearance. She does not appear ill. No distress.   HENT:   Head: Normocephalic and atraumatic.   Eyes: EOM are normal.   Neck:   Normal range of motion.  Cardiovascular: Normal rate, regular rhythm and normal heart sounds.   Pulmonary/Chest: Effort normal and breath sounds normal.   Abdominal: Normal appearance.   Musculoskeletal:      Cervical back: Normal range of motion.     Neurological: She is alert and oriented to person, place, and time. GCS eye subscore is 4. GCS verbal subscore is 5. GCS motor subscore is 6.   Skin: Skin is warm and dry.   Psychiatric: She has a normal mood and affect. Her speech is normal.         ED Course   Procedures  Labs Reviewed   SARS-COV-2 RDRP GENE    Narrative:     This test utilizes isothermal nucleic acid amplification   technology to detect the SARS-CoV-2 RdRp nucleic acid segment.   The analytical sensitivity (limit of detection) is 125 genome   equivalents/mL.   A POSITIVE result implies infection with the SARS-CoV-2 virus;   the patient is presumed to be contagious.     A NEGATIVE result means that SARS-CoV-2 nucleic acids are not   present above the limit of detection. A NEGATIVE result should be   treated as presumptive. It does not rule out the possibility of   COVID-19 and should not be the sole basis for treatment decisions.   If COVID-19 is strongly suspected based on clinical and exposure   history, re-testing using an alternate molecular assay should be   considered.   This test is only for use under the Food and Drug   Administration s Emergency Use Authorization (EUA).   Commercial kits are provided by [facility] [employer].   Performance characteristics of the EUA have been independently   verified by [facility] Department of   Pathology and  Laboratory Medicine.   _________________________________________________________________   The authorized Fact Sheet for Healthcare Providers and the authorized Fact   Sheet for Patients of the ID NOW COVID-19 are available on the FDA   website:     https://www.fda.gov/media/842136/download  https://www.fda.gov/media/681657/download       POCT INFLUENZA A/B MOLECULAR          Imaging Results    None          Medications - No data to display  Medical Decision Making:   Initial Assessment:   Patient requesting COVID test after possible exposure.  She does report having a cough at reported baseline.  No other symptoms.  Afebrile.  On exam, patient very well-appearing, no apparent distress  Differential Diagnosis:   COVID exposure  ED Management:  COVID test is negative.  Results discussed with patient.                       Clinical Impression:   Final diagnoses:  [Z20.822] COVID-19 virus not detected (Primary)          ED Disposition Condition    Discharge Stable        ED Prescriptions     None        Follow-up Information     Follow up With Specialties Details Why Contact Info    Your Doctor               Yonis Parkinson PA-C  12/23/21 1017       Yonis Parkinson PA-C  12/23/21 1021

## 2021-12-23 NOTE — ED NOTES
Pt presents to ED c/o Covid exposure w/o symptoms    APPEARANCE: Alert, oriented and in no acute distress.  HEENT: Speaks without hoarseness.  PERIPHERAL VASCULAR: peripheral pulses present. Normal cap refill. No edema. Warm to touch.    RESPIRATORY:Normal rate and effort. Respirations are equal and unlabored no obvious signs of distress.  GASTRO: soft, nondistended, nontender. Denies nausea, vomiting, or diarrhea.  : voids spontaneously and without difficulty.   MUSC: Full ROM. No obvious deformity. Ambulatory with a steady gait  SKIN: Skin is warm and dry, without discoloration. Mucous membranes moist.  NEURO: Pt is awake, alert, aware of environment. No neurologic deficits noted.

## 2021-12-23 NOTE — Clinical Note
"Margarita "Kacie Orourke was seen and treated in our emergency department on 12/23/2021.     COVID-19 is present in our communities across the state. There is limited testing for COVID at this time, so not all patients can be tested. In this situation, your employee meets the following criteria:    Margarita Orourke has met the criteria for COVID-19 testing and has a NEGATIVE result. The employee can return to work once they are asymptomatic for 72 hours without the use of fever reducing medications (Tylenol, Motrin, etc).     If you have any questions or concerns, or if I can be of further assistance, please do not hesitate to contact me.    Sincerely,             Yonis Parkinson PA-C"

## 2021-12-23 NOTE — DISCHARGE INSTRUCTIONS

## 2021-12-27 ENCOUNTER — HOSPITAL ENCOUNTER (EMERGENCY)
Facility: HOSPITAL | Age: 66
Discharge: HOME | End: 2021-12-27
Attending: EMERGENCY MEDICINE
Payer: MEDICARE

## 2021-12-27 ENCOUNTER — TELEPHONE (OUTPATIENT)
Dept: FAMILY MEDICINE | Facility: CLINIC | Age: 66
End: 2021-12-27
Payer: MEDICARE

## 2021-12-27 VITALS
TEMPERATURE: 99 F | RESPIRATION RATE: 20 BRPM | BODY MASS INDEX: 35.65 KG/M2 | HEART RATE: 82 BPM | DIASTOLIC BLOOD PRESSURE: 77 MMHG | WEIGHT: 214 LBS | OXYGEN SATURATION: 98 % | HEIGHT: 65 IN | SYSTOLIC BLOOD PRESSURE: 166 MMHG

## 2021-12-27 DIAGNOSIS — Z20.822 LAB TEST NEGATIVE FOR COVID-19 VIRUS: Primary | ICD-10-CM

## 2021-12-27 LAB — SARS-COV-2 RDRP RESP QL NAA+PROBE: NEGATIVE

## 2021-12-27 PROCEDURE — 99283 EMERGENCY DEPT VISIT LOW MDM: CPT | Mod: 25

## 2021-12-27 PROCEDURE — U0002 COVID-19 LAB TEST NON-CDC: HCPCS | Performed by: EMERGENCY MEDICINE

## 2021-12-27 NOTE — ED PROVIDER NOTES
Encounter Date: 2021    SCRIBE #1 NOTE: I, Isabel Bray , am scribing for, and in the presence of, Adeline Daniels NP.       History   No chief complaint on file.    66-year-old female presents to the ED due to COVID-19 concerns.   Patient reports she had exposure to COVID and wanted to get tested as she has a history of lung issues. She denies experiencing any symptoms.       The history is provided by the patient.     Review of patient's allergies indicates:   Allergen Reactions    Zantac [ranitidine hcl] Nausea And Vomiting    Amlodipine     Hydralazine     Ondansetron hcl Other (See Comments)    Penicillins     Phenergan [promethazine] Nausea Only     Past Medical History:   Diagnosis Date    Anxiety     Arthritis     Asthma     Back pain     Depression     Diabetes mellitus     Eczema     GERD (gastroesophageal reflux disease)     Hepatitis B     Hyperlipidemia     Hypertension     Seizures      No past surgical history on file.  No family history on file.  Social History     Tobacco Use    Smoking status: Former Smoker     Quit date: 2019     Years since quittin.5    Smokeless tobacco: Never Used   Substance Use Topics    Alcohol use: No    Drug use: No     Review of Systems   Constitutional: Negative for fever.   HENT: Negative for congestion, rhinorrhea and sore throat.    Respiratory: Negative for cough and shortness of breath.    Cardiovascular: Negative for chest pain.   Gastrointestinal: Negative for diarrhea, nausea and vomiting.   Genitourinary: Negative for dysuria.   Musculoskeletal: Negative for back pain.   Skin: Negative for rash.   Neurological: Negative for weakness and headaches.   Hematological: Does not bruise/bleed easily.   All other systems reviewed and are negative.      Physical Exam     Initial Vitals [21 1035]   BP Pulse Resp Temp SpO2   (!) 166/77 82 20 98.5 °F (36.9 °C) 98 %      MAP       --         Physical Exam    Nursing note and vitals  reviewed.  Constitutional: She appears well-developed and well-nourished.   HENT:   Head: Normocephalic and atraumatic.   Eyes: Conjunctivae and EOM are normal. Pupils are equal, round, and reactive to light.   Neck:   Normal range of motion.  Cardiovascular: Normal rate, regular rhythm, normal heart sounds and intact distal pulses. Exam reveals no gallop and no friction rub.    No murmur heard.  Pulmonary/Chest: Breath sounds normal. No respiratory distress. She has no wheezes. She has no rhonchi. She has no rales. She exhibits no tenderness.   Musculoskeletal:         General: No tenderness or edema. Normal range of motion.      Cervical back: Normal range of motion.     Neurological: She is alert and oriented to person, place, and time. She has normal strength. GCS score is 15. GCS eye subscore is 4. GCS verbal subscore is 5. GCS motor subscore is 6.   Skin: Skin is warm.       ED Course   Procedures  Labs Reviewed   SARS-COV-2 RNA AMPLIFICATION, QUAL          Imaging Results    None          Medications - No data to display  Medical Decision Making:   Initial Assessment:   65 y/o female which presents for COVID testing after being exposed last week. COVID pending.  Differential Diagnosis:   COVID positive, COVID negative, viral uri    Clinical Tests:   Lab Tests: Ordered and Reviewed  ED Management:  Pt examined and has a normal exam. COVID negative. Patient given strict return precautions and voiced understanding of all discharge instructions. Pt was stable at discharge.               Scribe Attestation:   Scribe #1: I performed the above scribed service and the documentation accurately describes the services I performed. I attest to the accuracy of the note.        ED Course as of 12/27/21 1920   Mon Dec 27, 2021   1127 SARS-CoV-2 RNA, Amplification, Qual: Negative [AT]      ED Course User Index  [AT] POLO Laird             Clinical Impression:   Final diagnoses:  [Z20.822] Lab test negative for  COVID-19 virus (Primary)          ED Disposition Condition    Discharge Stable        ED Prescriptions     None        Follow-up Information     Follow up With Specialties Details Why Contact Info    Verena Amaral MD Internal Medicine Schedule an appointment as soon as possible for a visit  As needed [address] [facility]  [address]  [place] [address]  [phone]          This document was produced by a scribe under my direction and in my presence. I agree with the content of the note and have made any necessary edits.     Adeline Daniels DNP, FNP-BC       POLO Laird  12/27/21 1920

## 2021-12-28 NOTE — ED NOTES
Pt denies symptoms but is requesting to be covid tested due to exposure; stable and ambulatory. To be seen and discharged by provider.

## 2022-01-04 ENCOUNTER — HOSPITAL ENCOUNTER (EMERGENCY)
Facility: HOSPITAL | Age: 67
Discharge: HOME | End: 2022-01-04
Attending: EMERGENCY MEDICINE
Payer: MEDICARE

## 2022-01-04 VITALS
TEMPERATURE: 98 F | RESPIRATION RATE: 20 BRPM | HEART RATE: 80 BPM | OXYGEN SATURATION: 96 % | DIASTOLIC BLOOD PRESSURE: 80 MMHG | SYSTOLIC BLOOD PRESSURE: 168 MMHG

## 2022-01-04 DIAGNOSIS — Z20.822 COVID-19 VIRUS NOT DETECTED: Primary | ICD-10-CM

## 2022-01-04 DIAGNOSIS — Z20.822 CLOSE EXPOSURE TO COVID-19 VIRUS: ICD-10-CM

## 2022-01-04 LAB — SARS-COV-2 RDRP RESP QL NAA+PROBE: NEGATIVE

## 2022-01-04 PROCEDURE — 99282 EMERGENCY DEPT VISIT SF MDM: CPT | Mod: 25

## 2022-01-04 PROCEDURE — U0002 COVID-19 LAB TEST NON-CDC: HCPCS | Performed by: PHYSICIAN ASSISTANT

## 2022-01-04 NOTE — DISCHARGE INSTRUCTIONS

## 2022-01-04 NOTE — ED PROVIDER NOTES
Encounter Date: 2022       History     Chief Complaint   Patient presents with    COVID-19 Concerns     Exposure to son who tested positive 9 days ago, she has had cough congestion, increased sob. Patient is a pulmonary patient at  and noticied she has had slight changes. O2 saturations 99% RA     66-year-old female presents to ED requesting COVID test after known exposure to her son who has recently tested positive.  Patient states she is a pulmonary patient and .  She states she has had increased nasal congestion with intermittent cough at her baseline.  She states she does become very anxious with the idea of having COVID, causing her to breathes heavier and feel short of breath.  No current shortness of breath or chest pain.  No fevers, chills, nausea, vomiting, abdominal pain, diarrhea, loss in taste or smell.  She is COVID vaccinated.  No other acute complaints at this time.    The history is provided by the patient.     Review of patient's allergies indicates:   Allergen Reactions    Zantac [ranitidine hcl] Nausea And Vomiting    Amlodipine     Hydralazine     Ondansetron hcl Other (See Comments)    Penicillins     Phenergan [promethazine] Nausea Only     Past Medical History:   Diagnosis Date    Anxiety     Arthritis     Asthma     Back pain     Depression     Diabetes mellitus     Eczema     GERD (gastroesophageal reflux disease)     Hepatitis B     Hyperlipidemia     Hypertension     Seizures      No past surgical history on file.  No family history on file.  Social History     Tobacco Use    Smoking status: Former Smoker     Quit date: 2019     Years since quittin.5    Smokeless tobacco: Never Used   Substance Use Topics    Alcohol use: No    Drug use: No     Review of Systems   Constitutional: Negative for activity change, chills and fever.   HENT: Positive for congestion. Negative for sore throat.    Respiratory: Positive for cough and shortness of breath.     Cardiovascular: Negative for chest pain.   Gastrointestinal: Negative for abdominal pain, diarrhea, nausea and vomiting.   Musculoskeletal: Negative for back pain, myalgias, neck pain and neck stiffness.   Neurological: Negative for headaches.   Psychiatric/Behavioral: The patient is nervous/anxious.        Physical Exam     Initial Vitals [01/04/22 0959]   BP Pulse Resp Temp SpO2   (!) 195/98 89 (!) 22 98 °F (36.7 °C) 99 %      MAP       --         Physical Exam    Nursing note and vitals reviewed.  Constitutional: Vital signs are normal. She appears well-developed and well-nourished. She is cooperative. She does not have a sickly appearance. She does not appear ill. No distress.   Very well-appearing, pleasant, no apparent distress   HENT:   Head: Normocephalic and atraumatic.   Eyes: EOM are normal.   Neck:   Normal range of motion.  Pulmonary/Chest: Effort normal and breath sounds normal.   Speaking in full sentences with no labored breathing and no signs of respiratory distress.  No use of accessory muscles.   Musculoskeletal:      Cervical back: Normal range of motion.     Neurological: She is alert. GCS eye subscore is 4. GCS verbal subscore is 5. GCS motor subscore is 6.   Psychiatric: She has a normal mood and affect. Her speech is normal and behavior is normal.         ED Course   Procedures  Labs Reviewed   SARS-COV-2 RNA AMPLIFICATION, QUAL          Imaging Results    None          Medications - No data to display  Medical Decision Making:   Initial Assessment:   Patient presents with concern of COVID after known exposure, reporting increased nasal congestion.  She also reports anxious with at the of having COVID, causing her to feel intermittently short of breath.  She is a pulmonary patient in [facility] and does have continued cough at baseline.  Afebrile arrival with O2 sat 99% on room air.  Patient otherwise well-appearing, speaking in full sentences, no labored breathing, no signs of respiratory  distress.  Differential Diagnosis:   COVID, viral, URI, allergy/irritant, anxiety  ED Management:  Due to patient's past medical history, known COVID exposure, patient high concern for COVID and her overall benefit from an early diagnosis, we will proceed with rapid test at this time.  Patient is otherwise well-appearing and maintaining appropriate O2 sats with no labored breathing.    COVID test is negative.  Results discussed with patient.  She continues to remain very well-appearing with no current complaints.  Stable for discharge home.  Encouraged to continue monitoring for symptoms closely, pulmonary follow-up, with high ED return precautions.  Patient states her understanding and agrees with plan.                      Clinical Impression:   Final diagnoses:  [Z20.822] COVID-19 virus not detected (Primary)  [Z20.822] Close exposure to COVID-19 virus          ED Disposition Condition    Discharge Stable        ED Prescriptions     None        Follow-up Information    None          Yonis Parkinson PA-C  01/04/22 1121

## 2022-01-04 NOTE — Clinical Note
"Margarita "Kacie Orourke was seen and treated in our emergency department on 1/4/2022.     COVID-19 is present in our communities across the state. There is limited testing for COVID at this time, so not all patients can be tested. In this situation, your employee meets the following criteria:    Margarita Orourke has met the criteria for COVID-19 testing and has a NEGATIVE result. The employee can return to work once they are asymptomatic for 72 hours without the use of fever reducing medications (Tylenol, Motrin, etc).     If you have any questions or concerns, or if I can be of further assistance, please do not hesitate to contact me.    Sincerely,             Yonis Parkinson PA-C"

## 2022-01-07 ENCOUNTER — PES CALL (OUTPATIENT)
Dept: ADMINISTRATIVE | Facility: CLINIC | Age: 67
End: 2022-01-07
Payer: MEDICARE

## 2022-01-10 ENCOUNTER — PATIENT MESSAGE (OUTPATIENT)
Dept: ADMINISTRATIVE | Facility: HOSPITAL | Age: 67
End: 2022-01-10
Payer: MEDICARE

## 2022-01-11 ENCOUNTER — TELEPHONE (OUTPATIENT)
Dept: INTERNAL MEDICINE | Facility: CLINIC | Age: 67
End: 2022-01-11
Payer: MEDICARE

## 2022-01-11 DIAGNOSIS — Z79.4 TYPE 2 DIABETES MELLITUS WITH HYPERGLYCEMIA, WITH LONG-TERM CURRENT USE OF INSULIN: ICD-10-CM

## 2022-01-11 DIAGNOSIS — E11.65 TYPE 2 DIABETES MELLITUS WITH HYPERGLYCEMIA, WITH LONG-TERM CURRENT USE OF INSULIN: ICD-10-CM

## 2022-01-11 RX ORDER — ISOPROPYL ALCOHOL 70 ML/100ML
1 SWAB TOPICAL 3 TIMES DAILY
Qty: 300 EACH | Refills: 3 | Status: SHIPPED | OUTPATIENT
Start: 2022-01-11

## 2022-01-11 RX ORDER — BLOOD-GLUCOSE CONTROL, NORMAL
300 EACH MISCELLANEOUS 3 TIMES DAILY
COMMUNITY
End: 2022-01-11 | Stop reason: SDUPTHER

## 2022-01-11 RX ORDER — BLOOD-GLUCOSE CONTROL, NORMAL
300 EACH MISCELLANEOUS 3 TIMES DAILY
Qty: 300 EACH | Refills: 3 | Status: SHIPPED | OUTPATIENT
Start: 2022-01-11

## 2022-01-11 NOTE — TELEPHONE ENCOUNTER
----- Message from Zoila Ang sent at 1/11/2022  3:05 PM CST -----  Contact: [phone]  Type:  Needs Medical Advice    Who Called: PT  Reason for call: regarding the Prescription for her Diabetic Testing Supplies, pt states the Pharmacy as not received the Prescription and it is going on a Month  Pharmacy name and phone #:   [facility]  Would the patient rather a call back or a response via [facility]?  Call back  Best Call Back Number: [phone]

## 2022-01-11 NOTE — TELEPHONE ENCOUNTER
No new care gaps identified.  Powered by [employer]. Reference number: [id number].   1/11/2022 4:27:11 PM CST

## 2022-01-11 NOTE — TELEPHONE ENCOUNTER
----- Message from Celestina Mckeon sent at 1/11/2022  4:07 PM CST -----  Contact: [phone]  Pt is calling back for the nurse she states that she was told to call back to send her testing supplies to [facility] please give a return call

## 2022-02-23 ENCOUNTER — OFFICE VISIT (OUTPATIENT)
Dept: INTERNAL MEDICINE | Facility: CLINIC | Age: 67
End: 2022-02-23
Payer: MEDICARE

## 2022-02-23 DIAGNOSIS — E11.69 HYPERLIPIDEMIA DUE TO TYPE 2 DIABETES MELLITUS: ICD-10-CM

## 2022-02-23 DIAGNOSIS — E11.59 HYPERTENSION ASSOCIATED WITH DIABETES: ICD-10-CM

## 2022-02-23 DIAGNOSIS — F51.01 PRIMARY INSOMNIA: ICD-10-CM

## 2022-02-23 DIAGNOSIS — F41.9 ANXIETY: ICD-10-CM

## 2022-02-23 DIAGNOSIS — E66.01 SEVERE OBESITY (BMI 35.0-35.9 WITH COMORBIDITY): ICD-10-CM

## 2022-02-23 DIAGNOSIS — D84.9 IMMUNOSUPPRESSED STATUS: ICD-10-CM

## 2022-02-23 DIAGNOSIS — Z79.4 TYPE 2 DIABETES MELLITUS WITH HYPERGLYCEMIA, WITH LONG-TERM CURRENT USE OF INSULIN: ICD-10-CM

## 2022-02-23 DIAGNOSIS — J84.10 PULMONARY FIBROSIS: Primary | ICD-10-CM

## 2022-02-23 DIAGNOSIS — I70.0 ATHEROSCLEROSIS OF AORTA: ICD-10-CM

## 2022-02-23 DIAGNOSIS — J42 CHRONIC BRONCHITIS, UNSPECIFIED CHRONIC BRONCHITIS TYPE: ICD-10-CM

## 2022-02-23 DIAGNOSIS — E11.65 TYPE 2 DIABETES MELLITUS WITH HYPERGLYCEMIA, WITH LONG-TERM CURRENT USE OF INSULIN: ICD-10-CM

## 2022-02-23 DIAGNOSIS — E78.5 HYPERLIPIDEMIA DUE TO TYPE 2 DIABETES MELLITUS: ICD-10-CM

## 2022-02-23 DIAGNOSIS — I15.2 HYPERTENSION ASSOCIATED WITH DIABETES: ICD-10-CM

## 2022-02-23 DIAGNOSIS — M06.9 RHEUMATOID ARTHRITIS, INVOLVING UNSPECIFIED SITE, UNSPECIFIED WHETHER RHEUMATOID FACTOR PRESENT: ICD-10-CM

## 2022-02-23 PROBLEM — Z20.822 LAB TEST NEGATIVE FOR COVID-19 VIRUS: Status: RESOLVED | Noted: 2021-12-27 | Resolved: 2022-02-23

## 2022-02-23 PROCEDURE — 4010F PR ACE/ARB THEARPY RXD/TAKEN: ICD-10-PCS | Mod: HCNC,CPTII,S$GLB, | Performed by: INTERNAL MEDICINE

## 2022-02-23 PROCEDURE — 99499 RISK ADDL DX/OHS AUDIT: ICD-10-PCS | Mod: S$GLB,,, | Performed by: INTERNAL MEDICINE

## 2022-02-23 PROCEDURE — 1159F PR MEDICATION LIST DOCUMENTED IN MEDICAL RECORD: ICD-10-PCS | Mod: HCNC,CPTII,S$GLB, | Performed by: INTERNAL MEDICINE

## 2022-02-23 PROCEDURE — 99499 UNLISTED E&M SERVICE: CPT | Mod: S$GLB,,, | Performed by: INTERNAL MEDICINE

## 2022-02-23 PROCEDURE — 3051F PR MOST RECENT HEMOGLOBIN A1C LEVEL 7.0 - < 8.0%: ICD-10-PCS | Mod: HCNC,CPTII,S$GLB, | Performed by: INTERNAL MEDICINE

## 2022-02-23 PROCEDURE — 4010F ACE/ARB THERAPY RXD/TAKEN: CPT | Mod: HCNC,CPTII,S$GLB, | Performed by: INTERNAL MEDICINE

## 2022-02-23 PROCEDURE — 1159F MED LIST DOCD IN RCRD: CPT | Mod: HCNC,CPTII,S$GLB, | Performed by: INTERNAL MEDICINE

## 2022-02-23 PROCEDURE — 99214 OFFICE O/P EST MOD 30 MIN: CPT | Mod: HCNC,S$GLB,, | Performed by: INTERNAL MEDICINE

## 2022-02-23 PROCEDURE — 99999 PR PBB SHADOW E&M-EST. PATIENT-LVL IV: ICD-10-PCS | Mod: PBBFAC,HCNC,, | Performed by: INTERNAL MEDICINE

## 2022-02-23 PROCEDURE — 3051F HG A1C>EQUAL 7.0%<8.0%: CPT | Mod: HCNC,CPTII,S$GLB, | Performed by: INTERNAL MEDICINE

## 2022-02-23 PROCEDURE — 99214 PR OFFICE/OUTPT VISIT, EST, LEVL IV, 30-39 MIN: ICD-10-PCS | Mod: HCNC,S$GLB,, | Performed by: INTERNAL MEDICINE

## 2022-02-23 PROCEDURE — 99999 PR PBB SHADOW E&M-EST. PATIENT-LVL IV: CPT | Mod: PBBFAC,HCNC,, | Performed by: INTERNAL MEDICINE

## 2022-02-23 RX ORDER — ALPRAZOLAM 0.25 MG/1
0.25 TABLET ORAL 3 TIMES DAILY
Qty: 90 TABLET | Refills: 3 | Status: SHIPPED | OUTPATIENT
Start: 2022-02-23 | End: 2023-03-02 | Stop reason: SDUPTHER

## 2022-02-23 RX ORDER — LOSARTAN POTASSIUM 50 MG/1
50 TABLET ORAL DAILY
COMMUNITY
End: 2022-09-01 | Stop reason: SDUPTHER

## 2022-02-23 NOTE — ASSESSMENT & PLAN NOTE
Taking 1/2 tablet of xanax PRN, anxiety and depression have been better.  Has not been having panic attacks as much as many more.  Had damage at her house from [place], had to get new roof.  Has been going to [facility] support group.  Effexor made her hallucinate and stopped.  Also stopped buspar because she felt like it made her depressed.  Has had anxiety since she was 17.  Has tried paxil, prozac, lexapro and zoloft in past; had issues with all of them.

## 2022-02-23 NOTE — ASSESSMENT & PLAN NOTE
BP controlled today on HCTZ 25 mg, Losartan 50 mg daily. No CP/SOB. Takes Clonidine PRN.  Previously on Norvasc, Irbesartan and Bystolic.  BP at home has been 120/70's.  Sees Dr. Valdez.

## 2022-02-23 NOTE — ASSESSMENT & PLAN NOTE
Sees Dr. Orellana. Doing Pulmonary Rehab 3 times per week.  2/2 her RA. Does Symbicort and Albuterol PRN.  Breathing has been stable.

## 2022-02-23 NOTE — ASSESSMENT & PLAN NOTE
Had home sleep study with apnea episodes, not enough for LINDSEY, recommended in lab study.  Goes to bed around 9 PM, is up and down, doesn't sleep.  Wakes up at 2:30 AM, BP goes up. Takes xanax and BP goes down and she will sleep until ~8:00 AM. Talks in her sleep and wakes herself up.  Feels sleepy all day, falls asleep sitting up all day.  Overweight.  Snores.

## 2022-02-23 NOTE — PATIENT INSTRUCTIONS
Try the following over the counter medications to help with your symptoms:  1. Antihistamine once daily (either Zyrtec, Allegra, Claritin or Xyzal)  2. Flonase nasal spray once daily (fluticasone is generic)    3. Mucinex twice daily (Guaifenesin is generic)    Get your 4th COVID booster ASAP.    You can get Shingrix vaccine at the pharmacy, you do not need a prescription for this.    Call to schedule your eye exam with Dr. Esparza in March.

## 2022-02-23 NOTE — PROGRESS NOTES
[facility] Note    Chief Complaint      Chief Complaint   Patient presents with    Follow-up     History of Present Illness      Margarita Orourke is a 66 y.o. female who presents today for follow up of DM2.  Patient comes to appointment alone.  Pulm: Dr. Orellana, cardiology: Dr. Valdez, Rheum: Dr. Yin, optho: [facility]    Problem List Items Addressed This Visit     Hypertension associated with diabetes    Current Assessment & Plan     BP controlled today on HCTZ 25 mg, Losartan 50 mg daily. No CP/SOB. Takes Clonidine PRN.  Previously on Norvasc, Irbesartan and Bystolic.  BP at home has been 120/70's.  Sees Dr. Valdez.           Hyperlipidemia due to type 2 diabetes mellitus    Current Assessment & Plan     Stable on Zocor 40 mg daily, no myalgias.  The 10-year ASCVD risk score (Rishi MICHAEL JrRony, et al., 2013) is: 24.4%    Values used to calculate the score:      Age: 66 years      Sex: Female      Is Non- : Yes      Diabetic: Yes      Tobacco smoker: No      Systolic Blood Pressure: 138 mmHg      Is BP treated: Yes      HDL Cholesterol: 50 mg/dL      Total Cholesterol: 189 mg/dL             Type 2 diabetes mellitus with hyperglycemia, with long-term current use of insulin    Current Assessment & Plan     A1C 7.3 in 6/2020. stable on lantus 48 units at night.  Lowest glucose has been 100.  AM glucose today 126.           Relevant Orders    CBC Auto Differential    Comprehensive Metabolic Panel    Lipid Panel    Microalbumin/Creatinine Ratio, Urine    Hemoglobin A1C    Anxiety    Current Assessment & Plan     Taking 1/2 tablet of xanax PRN, anxiety and depression have been better.  Has not been having panic attacks as much as many more.  Had damage at her house from [place], had to get new roof.  Has been going to [facility] support group.  Effexor made her hallucinate and stopped.  Also stopped buspar because she felt like it made her depressed.  Has had anxiety since  she was 17.  Has tried paxil, prozac, lexapro and zoloft in past; had issues with all of them.           COPD (chronic obstructive pulmonary disease)    Current Assessment & Plan     Stable on Spiriva, symbicort with albuterol PRN. No cough, no fever, no SOB.  Seeing Pulm at , Dr. Orellana.           Rheumatoid arthritis    Current Assessment & Plan     Sees rheum Dr. Yin at .  On Plaquenil and Cellcept. Has been doing ok with pain since change, not as bad as before.  UTD on eye exam.  Worst in shoulder and hands.           Primary insomnia    Current Assessment & Plan     Had home sleep study with apnea episodes, not enough for LINDSEY, recommended in lab study.  Goes to bed around 9 PM, is up and down, doesn't sleep.  Wakes up at 2:30 AM, BP goes up. Takes xanax and BP goes down and she will sleep until ~8:00 AM. Talks in her sleep and wakes herself up.  Feels sleepy all day, falls asleep sitting up all day.  Overweight.  Snores.           Atherosclerosis of aorta    Overview     Media tab 7/1/20 Located within [facility] CT Chest:   Some tortuosity and atherosclerotic calcification of the aorta.              Current Assessment & Plan     On ASA and statin.           Severe obesity (BMI 35.0-35.9 with comorbidity)    Current Assessment & Plan     Going to Pulmonary rehab, still not doing well on diet.           Pulmonary fibrosis - Primary    Current Assessment & Plan     Sees Dr. Orellana. Doing Pulmonary Rehab 3 times per week.  2/2 her RA. Does Symbicort and Albuterol PRN.  Breathing has been stable.                 Health Maintenance   Topic Date Due    TETANUS VACCINE  Never done    Hemoglobin A1c  12/23/2021    Eye Exam  03/18/2022    Lipid Panel  06/23/2022    Mammogram  09/30/2022    DEXA Scan  12/04/2022    High Dose Statin  02/23/2023    Foot Exam  02/23/2023    Hepatitis C Screening  Completed       Past Medical History:   Diagnosis Date    Anxiety     Arthritis     Asthma     Back pain      Depression     Diabetes mellitus     Eczema     GERD (gastroesophageal reflux disease)     Hepatitis B     Hyperlipidemia     Hypertension     Seizures        History reviewed. No pertinent surgical history.    family history is not on file.    Social History     Tobacco Use    Smoking status: Former Smoker     Quit date: 2019     Years since quittin.7    Smokeless tobacco: Never Used   Substance Use Topics    Alcohol use: No    Drug use: No       Review of Systems   Constitutional: Negative for chills and fever.   HENT: Negative for sore throat.    Respiratory: Negative for cough and shortness of breath.    Cardiovascular: Negative for chest pain and palpitations.   Gastrointestinal: Negative for constipation, diarrhea, nausea and vomiting.   Genitourinary: Negative for dysuria and hematuria.   Musculoskeletal: Negative for falls.   Neurological: Negative for headaches.        Outpatient Encounter Medications as of 2022   Medication Sig Dispense Refill    albuterol (PROVENTIL/VENTOLIN HFA) 90 mcg/actuation inhaler INHALE 2 PUFFS INTO THE LUNGS EVERY 6 HOURS AS NEEDED FOR WHEEZING 54 g 3    alcohol swabs (ALCOHOL PREP PADS) PadM Apply 1 each topically 3 (three) times daily. 300 each 3    ALPRAZolam (XANAX) 0.25 MG tablet TAKE 1 TABLET BY MOUTH THREE TIMES DAILY 90 tablet 3    blood sugar diagnostic (TRUE METRIX GLUCOSE TEST STRIP) Strp 300 strips by Misc.(Non-Drug; Combo Route) route 3 (three) times daily. 300 strip 3    budesonide-formoterol 160-4.5 mcg (SYMBICORT) 160-4.5 mcg/actuation HFAA Inhale 2 puffs into the lungs every 12 (twelve) hours. Controller      cloNIDine (CATAPRES) 0.1 MG tablet Take 1 tablet (0.1 mg total) by mouth daily as needed (BP over 160/100). 30 tablet 1    clotrimazole-betamethasone 1-0.05% (LOTRISONE) cream Apply topically 2 (two) times daily. 45 g 3    ergocalciferol (ERGOCALCIFEROL) 50,000 unit Cap Take 1 capsule (50,000 Units total) by mouth every 7  "days. 12 capsule 3    hydroCHLOROthiazide (HYDRODIURIL) 25 MG tablet Take 1 tablet (25 mg total) by mouth once daily. 90 tablet 3    hydroxychloroquine (PLAQUENIL) 200 mg tablet Take 200 mg by mouth once daily.      insulin (LANTUS SOLOSTAR U-100 INSULIN) glargine 100 units/mL (3mL) SubQ pen Inject 42 Units into the skin once daily. (Patient taking differently: Inject 48 Units into the skin once daily.) 39 mL 3    lancets 30 gauge Misc 300 lancets by Misc.(Non-Drug; Combo Route) route 3 (three) times daily. 300 each 3    latanoprost 0.005 % ophthalmic solution       losartan (COZAAR) 50 MG tablet Take 50 mg by mouth once daily.      mycophenolate (CELLCEPT) 500 mg Tab Take 500 mg by mouth 2 (two) times daily.      omeprazole (PRILOSEC) 20 MG capsule Take 1 capsule (20 mg total) by mouth once daily. 90 capsule 3    ondansetron (ZOFRAN-ODT) 4 MG TbDL Take 1 tablet (4 mg total) by mouth every 12 (twelve) hours. 30 tablet 3    pen needle, diabetic 32 gauge x 5/32" Ndle 1 Box by Misc.(Non-Drug; Combo Route) route once daily. Use daily with lantus solostar 90 each 3    rosuvastatin (CRESTOR) 20 MG tablet Take 1 tablet (20 mg total) by mouth once daily. 90 tablet 3    simvastatin (ZOCOR) 40 MG tablet Take 1 tablet (40 mg total) by mouth every evening. 90 tablet 3    timolol maleate 0.5% (TIMOPTIC-XE) 0.5 % SolG Place 1 drop into both eyes nightly.      tiotropium (SPIRIVA WITH HANDIHALER) 18 mcg inhalation capsule   INL THE CONTENTS OF 1 C INTO THE LUNGS ONCE D. CONTROLLER      [DISCONTINUED] azithromycin (Z-MADELEINE) 250 MG tablet Take 2 tablets by mouth on day 1; Take 1 tablet by mouth on days 2-5 (Patient not taking: Reported on 2/23/2022) 6 tablet 0    [DISCONTINUED] furosemide (LASIX) 20 MG tablet TK 1 T PO D PRF SWELLING (Patient not taking: No sig reported) 90 tablet 3    [DISCONTINUED] insulin glargine (LANTUS) 100 unit/mL injection   INJECT 40 UNITS INTO THE SKIN ONCE D      [DISCONTINUED] irbesartan " (AVAPRO) 150 MG tablet Take 1 tablet (150 mg total) by mouth every evening. (Patient not taking: Reported on 2/23/2022) 90 tablet 3     No facility-administered encounter medications on file as of 2/23/2022.        Review of patient's allergies indicates:   Allergen Reactions    Zantac [ranitidine hcl] Nausea And Vomiting    Amlodipine     Hydralazine     Ondansetron hcl Other (See Comments)    Penicillins     Phenergan [promethazine] Nausea Only       Physical Exam      Vital Signs  Temp: (P) 98.7 °F (37.1 °C)  Pulse: (P) 78  SpO2: (P) 98 %  BP: (P) 138/86  Pain Score: (P)   5  Pain Loc: (P) Hip  Height and Weight  Weight: (P) 100.5 kg (221 lb 9 oz)]    Physical Exam  Constitutional:       Appearance: She is well-developed.   HENT:      Head: Normocephalic and atraumatic.      Right Ear: External ear normal.      Left Ear: External ear normal.   Eyes:      General:         Right eye: No discharge.         Left eye: No discharge.   Cardiovascular:      Rate and Rhythm: Normal rate and regular rhythm.      Heart sounds: Normal heart sounds. No murmur heard.  Pulmonary:      Effort: Pulmonary effort is normal. No respiratory distress.      Breath sounds: Normal breath sounds.   Abdominal:      General: There is no distension.      Palpations: Abdomen is soft.      Tenderness: There is no abdominal tenderness. There is no guarding.   Musculoskeletal:         General: Normal range of motion.      Cervical back: Normal range of motion.   Skin:     General: Skin is warm and dry.   Neurological:      Mental Status: She is alert and oriented to person, place, and time.   Psychiatric:         Behavior: Behavior normal.          Laboratory:  CBC:  No results for input(s): WBC, RBC, HGB, HCT, PLT, MCV, MCH, MCHC in the last 2160 hours.  CMP:  No results for input(s): GLU, CALCIUM, ALBUMIN, PROT, NA, K, CO2, CL, BUN, ALKPHOS, ALT, AST, BILITOT in the last 2160 hours.    Invalid input(s): CREATININ  URINALYSIS:  No results  for input(s): COLORU, CLARITYU, SPECGRAV, PHUR, PROTEINUA, GLUCOSEU, BILIRUBINCON, BLOODU, WBCU, RBCU, BACTERIA, MUCUS, NITRITE, LEUKOCYTESUR, UROBILINOGEN, HYALINECASTS in the last 2160 hours.   LIPIDS:  No results for input(s): TSH, HDL, CHOL, TRIG, LDLCALC, CHOLHDL, NONHDLCHOL, TOTALCHOLEST in the last 2160 hours.  TSH:  No results for input(s): TSH in the last 2160 hours.  A1C:  No results for input(s): HGBA1C in the last 2160 hours.    Radiology:  No results found in the last 30 days.     Assessment/Plan     Margarita Orourke is a 66 y.o.female with:    1. Pulmonary fibrosis    2. Anxiety    3. Hypertension associated with diabetes    4. Hyperlipidemia due to type 2 diabetes mellitus    5. Type 2 diabetes mellitus with hyperglycemia, with long-term current use of insulin  - CBC Auto Differential; Future  - Comprehensive Metabolic Panel; Future  - Lipid Panel; Future  - Microalbumin/Creatinine Ratio, Urine; Future  - Hemoglobin A1C; Future    6. Rheumatoid arthritis, involving unspecified site, unspecified whether rheumatoid factor present    7. Primary insomnia    8. Chronic bronchitis, unspecified chronic bronchitis type    9. Atherosclerosis of aorta    10. Severe obesity (BMI 35.0-35.9 with comorbidity)    -do MMG and DEXA after 9/2022  -labs ordered  -will call to schedule eye exam with Dr. Esparza next month  -Continue current medications and maintain follow up with specialists.    -Follow up in about 6 months (around 8/23/2022) for follow up of medical problems.       Verena Amaral MD  [facility]

## 2022-02-23 NOTE — ASSESSMENT & PLAN NOTE
Stable on Zocor 40 mg daily, no myalgias.  The 10-year ASCVD risk score (Rishi MICHAEL Jr., et al., 2013) is: 24.4%    Values used to calculate the score:      Age: 66 years      Sex: Female      Is Non- : Yes      Diabetic: Yes      Tobacco smoker: No      Systolic Blood Pressure: 138 mmHg      Is BP treated: Yes      HDL Cholesterol: 50 mg/dL      Total Cholesterol: 189 mg/dL

## 2022-02-23 NOTE — ASSESSMENT & PLAN NOTE
Sees rheum Dr. Yin at [facility].  On Plaquenil and Cellcept. Has been doing ok with pain since change, not as bad as before.  UTD on eye exam.  Worst in shoulder and hands.

## 2022-03-22 LAB
CHOL/HDLC RATIO: 4.07
CHOLEST SERPL-MSCNC: 183 MG/DL (ref 100–200)
HBA1C MFR BLD: 8.4 % (ref 4.5–5.7)
HDLC SERPL-MCNC: 45 MG/DL (ref 40–75)
LDLC SERPL CALC-MCNC: 126 MG/DL (ref 0–125)
TRIGL SERPL-MCNC: 77 MG/DL (ref 30–150)

## 2022-03-23 DIAGNOSIS — E11.65 TYPE 2 DIABETES MELLITUS WITH HYPERGLYCEMIA, WITH LONG-TERM CURRENT USE OF INSULIN: ICD-10-CM

## 2022-03-23 DIAGNOSIS — E11.65 TYPE 2 DIABETES MELLITUS WITH HYPERGLYCEMIA, WITH LONG-TERM CURRENT USE OF INSULIN: Primary | ICD-10-CM

## 2022-03-23 DIAGNOSIS — Z79.4 TYPE 2 DIABETES MELLITUS WITH HYPERGLYCEMIA, WITH LONG-TERM CURRENT USE OF INSULIN: Primary | ICD-10-CM

## 2022-03-23 DIAGNOSIS — Z79.4 TYPE 2 DIABETES MELLITUS WITH HYPERGLYCEMIA, WITH LONG-TERM CURRENT USE OF INSULIN: ICD-10-CM

## 2022-03-23 RX ORDER — INSULIN GLARGINE 100 [IU]/ML
54 INJECTION, SOLUTION SUBCUTANEOUS DAILY
Qty: 54 ML | Refills: 3 | Status: SHIPPED | OUTPATIENT
Start: 2022-03-23 | End: 2022-07-11 | Stop reason: SDUPTHER

## 2022-03-23 NOTE — TELEPHONE ENCOUNTER
Care Due:                  Date            Visit Type   Department     Provider  --------------------------------------------------------------------------------                                EP -                              PRIMARY      [facility] PRIMARY  Last Visit: 02-      CARE ([facility])   CARE           Verena Amaral                               -                              [facility] PRIMARY  Next Visit: 09-      CARE ([facility])   [facility]           Verena Amaral                                                            Last  Test          Frequency    Reason                     Performed    Due Date  --------------------------------------------------------------------------------    CMP.........  12 months..  hydroCHLOROthiazide,       06- 06-                             insulin, rosuvastatin,                             simvastatin..............    Powered by [employer]. Reference number: [id number].   3/23/2022 9:58:51 AM CDT

## 2022-03-23 NOTE — TELEPHONE ENCOUNTER
----- Message from Heidi Hastings MA sent at 3/23/2022  9:46 AM CDT -----  Pt said she went to up to 48 units for a few months because she said her sugars weren't going down.

## 2022-03-23 NOTE — TELEPHONE ENCOUNTER
Pt aware, scheduled pt for 3m f/u with Angela and switched A1C order to [facility].     Asking for a new script of the lantus with the increased dose to be sent to the pharmacy, pended refill.

## 2022-05-30 ENCOUNTER — PATIENT MESSAGE (OUTPATIENT)
Dept: ADMINISTRATIVE | Facility: HOSPITAL | Age: 67
End: 2022-05-30
Payer: MEDICARE

## 2022-06-01 ENCOUNTER — TELEPHONE (OUTPATIENT)
Dept: INTERNAL MEDICINE | Facility: CLINIC | Age: 67
End: 2022-06-01
Payer: MEDICARE

## 2022-06-01 DIAGNOSIS — E11.9 TYPE 2 DIABETES MELLITUS WITHOUT COMPLICATION, UNSPECIFIED WHETHER LONG TERM INSULIN USE: ICD-10-CM

## 2022-06-01 NOTE — TELEPHONE ENCOUNTER
Watery eyes, runny nose, congestion, coughing x2 weeks. Taking claritin, did help for a little bit. Has not been tested for flu, covid, or strep.

## 2022-06-01 NOTE — TELEPHONE ENCOUNTER
----- Message from Jolie Nesbitt sent at 6/1/2022  3:10 PM CDT -----  Type:  Needs Medical Advice    Who Called: pt   Symptoms (please be specific):  runny eyes, nose & cough sinus drip   How long has patient had these symptoms:    Pharmacy name and phone #:      [id number] - [place] - [address] AT [facility] [address]   Phone:  [phone]  Fax:  [phone]          Would the patient rather a call back or a response via [facility]? Call    Best Call Back Number: [phone] (M)       Additional Information:

## 2022-06-02 ENCOUNTER — OFFICE VISIT (OUTPATIENT)
Dept: INTERNAL MEDICINE | Facility: CLINIC | Age: 67
End: 2022-06-02
Payer: MEDICARE

## 2022-06-02 VITALS
HEART RATE: 74 BPM | WEIGHT: 221.81 LBS | OXYGEN SATURATION: 99 % | DIASTOLIC BLOOD PRESSURE: 84 MMHG | TEMPERATURE: 98 F | BODY MASS INDEX: 36.91 KG/M2 | SYSTOLIC BLOOD PRESSURE: 132 MMHG

## 2022-06-02 DIAGNOSIS — J06.9 URTI (ACUTE UPPER RESPIRATORY INFECTION): Primary | ICD-10-CM

## 2022-06-02 PROCEDURE — 4010F ACE/ARB THERAPY RXD/TAKEN: CPT | Mod: CPTII,S$GLB,, | Performed by: NURSE PRACTITIONER

## 2022-06-02 PROCEDURE — 3008F BODY MASS INDEX DOCD: CPT | Mod: CPTII,S$GLB,, | Performed by: NURSE PRACTITIONER

## 2022-06-02 PROCEDURE — 99999 PR PBB SHADOW E&M-EST. PATIENT-LVL IV: CPT | Mod: PBBFAC,,, | Performed by: NURSE PRACTITIONER

## 2022-06-02 PROCEDURE — 99499 RISK ADDL DX/OHS AUDIT: ICD-10-PCS | Mod: S$GLB,,, | Performed by: NURSE PRACTITIONER

## 2022-06-02 PROCEDURE — 3079F PR MOST RECENT DIASTOLIC BLOOD PRESSURE 80-89 MM HG: ICD-10-PCS | Mod: CPTII,S$GLB,, | Performed by: NURSE PRACTITIONER

## 2022-06-02 PROCEDURE — 3052F PR MOST RECENT HEMOGLOBIN A1C LEVEL 8.0 - < 9.0%: ICD-10-PCS | Mod: CPTII,S$GLB,, | Performed by: NURSE PRACTITIONER

## 2022-06-02 PROCEDURE — 99214 PR OFFICE/OUTPT VISIT, EST, LEVL IV, 30-39 MIN: ICD-10-PCS | Mod: S$GLB,,, | Performed by: NURSE PRACTITIONER

## 2022-06-02 PROCEDURE — 3075F SYST BP GE 130 - 139MM HG: CPT | Mod: CPTII,S$GLB,, | Performed by: NURSE PRACTITIONER

## 2022-06-02 PROCEDURE — 1159F MED LIST DOCD IN RCRD: CPT | Mod: CPTII,S$GLB,, | Performed by: NURSE PRACTITIONER

## 2022-06-02 PROCEDURE — 99999 PR PBB SHADOW E&M-EST. PATIENT-LVL IV: ICD-10-PCS | Mod: PBBFAC,,, | Performed by: NURSE PRACTITIONER

## 2022-06-02 PROCEDURE — 99499 UNLISTED E&M SERVICE: CPT | Mod: S$GLB,,, | Performed by: NURSE PRACTITIONER

## 2022-06-02 PROCEDURE — 1126F PR PAIN SEVERITY QUANTIFIED, NO PAIN PRESENT: ICD-10-PCS | Mod: CPTII,S$GLB,, | Performed by: NURSE PRACTITIONER

## 2022-06-02 PROCEDURE — 3075F PR MOST RECENT SYSTOLIC BLOOD PRESS GE 130-139MM HG: ICD-10-PCS | Mod: CPTII,S$GLB,, | Performed by: NURSE PRACTITIONER

## 2022-06-02 PROCEDURE — 1126F AMNT PAIN NOTED NONE PRSNT: CPT | Mod: CPTII,S$GLB,, | Performed by: NURSE PRACTITIONER

## 2022-06-02 PROCEDURE — 3052F HG A1C>EQUAL 8.0%<EQUAL 9.0%: CPT | Mod: CPTII,S$GLB,, | Performed by: NURSE PRACTITIONER

## 2022-06-02 PROCEDURE — 4010F PR ACE/ARB THEARPY RXD/TAKEN: ICD-10-PCS | Mod: CPTII,S$GLB,, | Performed by: NURSE PRACTITIONER

## 2022-06-02 PROCEDURE — 3008F PR BODY MASS INDEX (BMI) DOCUMENTED: ICD-10-PCS | Mod: CPTII,S$GLB,, | Performed by: NURSE PRACTITIONER

## 2022-06-02 PROCEDURE — 1159F PR MEDICATION LIST DOCUMENTED IN MEDICAL RECORD: ICD-10-PCS | Mod: CPTII,S$GLB,, | Performed by: NURSE PRACTITIONER

## 2022-06-02 PROCEDURE — 3079F DIAST BP 80-89 MM HG: CPT | Mod: CPTII,S$GLB,, | Performed by: NURSE PRACTITIONER

## 2022-06-02 PROCEDURE — 99214 OFFICE O/P EST MOD 30 MIN: CPT | Mod: S$GLB,,, | Performed by: NURSE PRACTITIONER

## 2022-06-02 RX ORDER — HYDROXYCHLOROQUINE SULFATE 200 MG/1
200 TABLET, FILM COATED ORAL 2 TIMES DAILY
COMMUNITY
Start: 2022-01-11

## 2022-06-02 RX ORDER — AZITHROMYCIN 250 MG/1
TABLET, FILM COATED ORAL
Qty: 6 TABLET | Refills: 0 | Status: SHIPPED | OUTPATIENT
Start: 2022-06-02 | End: 2022-06-07

## 2022-06-02 NOTE — PROGRESS NOTES
[facility]    Chief Complaint      Chief Complaint   Patient presents with    Sinus Problem    Chest Congestion     X 2 wks     History of Present Illness      Margarita Orourke is a 66 y.o. female patient of Dr. Pihlip with chronic conditions of DM 2 with complications, morbid obesity, rheumatoid arthritis, vascular disease, COPD, fibrosis of the lungs and interstitial disease of pulmonary, CHF who presents today for head congestion, sinus drip, watery eyes, postnasal drip, sore throat and cough x2 weeks.  Over-the-counter medicines not resolving symptoms.  No complaints of fever chills shortness of breath nausea vomiting or diarrhea.  Reports asthma and COPD are stable at this time.  Has had pneumonia several times in the past    Health Maintenance   Topic Date Due    TETANUS VACCINE  Never done    Eye Exam  03/18/2022    Hemoglobin A1c  06/22/2022    Mammogram  09/30/2022    DEXA Scan  12/04/2022    Foot Exam  02/23/2023    Lipid Panel  03/22/2023    High Dose Statin  06/02/2023    Hepatitis C Screening  Completed       Past Medical History:   Diagnosis Date    Anxiety     Arthritis     Asthma     Back pain     Depression     Diabetes mellitus     Eczema     GERD (gastroesophageal reflux disease)     Hepatitis B     Hyperlipidemia     Hypertension     Seizures        History reviewed. No pertinent surgical history.    family history is not on file.    Social History     Tobacco Use    Smoking status: Former Smoker     Quit date: 6/2/2019     Years since quitting: 3.0    Smokeless tobacco: Never Used   Substance Use Topics    Alcohol use: No    Drug use: No       Review of Systems   Constitutional: Positive for malaise/fatigue. Negative for chills and fever.   HENT: Positive for congestion, sinus pain and sore throat.    Eyes: Negative for blurred vision.   Respiratory: Positive for cough and sputum production. Negative for shortness of breath.     Cardiovascular: Negative for chest pain and palpitations.   Gastrointestinal: Negative for diarrhea, nausea and vomiting.   Genitourinary: Negative for dysuria.   Musculoskeletal: Negative for myalgias.   Neurological: Negative for dizziness and headaches.        Outpatient Encounter Medications as of 6/2/2022   Medication Sig Dispense Refill    albuterol (PROVENTIL/VENTOLIN HFA) 90 mcg/actuation inhaler INHALE 2 PUFFS INTO THE LUNGS EVERY 6 HOURS AS NEEDED FOR WHEEZING 54 g 3    alcohol swabs (ALCOHOL PREP PADS) PadM Apply 1 each topically 3 (three) times daily. 300 each 3    ALPRAZolam (XANAX) 0.25 MG tablet Take 1 tablet (0.25 mg total) by mouth 3 (three) times daily. 90 tablet 3    blood sugar diagnostic (TRUE METRIX GLUCOSE TEST STRIP) Strp 300 strips by Misc.(Non-Drug; Combo Route) route 3 (three) times daily. 300 strip 3    budesonide-formoterol 160-4.5 mcg (SYMBICORT) 160-4.5 mcg/actuation HFAA Inhale 2 puffs into the lungs every 12 (twelve) hours. Controller      cloNIDine (CATAPRES) 0.1 MG tablet Take 1 tablet (0.1 mg total) by mouth daily as needed (BP over 160/100). 30 tablet 1    clotrimazole-betamethasone 1-0.05% (LOTRISONE) cream Apply topically 2 (two) times daily. 45 g 3    ergocalciferol (ERGOCALCIFEROL) 50,000 unit Cap Take 1 capsule (50,000 Units total) by mouth every 7 days. 12 capsule 3    hydroCHLOROthiazide (HYDRODIURIL) 25 MG tablet Take 1 tablet (25 mg total) by mouth once daily. 90 tablet 3    hydrOXYchloroQUINE (PLAQUENIL) 200 mg tablet Take 200 mg by mouth 2 (two) times a day.      insulin (LANTUS SOLOSTAR U-100 INSULIN) glargine 100 units/mL (3mL) SubQ pen Inject 54 Units into the skin once daily. 54 mL 3    lancets 30 gauge Misc 300 lancets by Misc.(Non-Drug; Combo Route) route 3 (three) times daily. 300 each 3    latanoprost 0.005 % ophthalmic solution       losartan (COZAAR) 50 MG tablet Take 50 mg by mouth once daily.      mycophenolate (CELLCEPT) 500 mg Tab Take  "500 mg by mouth 2 (two) times daily.      omeprazole (PRILOSEC) 20 MG capsule Take 1 capsule (20 mg total) by mouth once daily. 90 capsule 3    ondansetron (ZOFRAN-ODT) 4 MG TbDL Take 1 tablet (4 mg total) by mouth every 12 (twelve) hours. 30 tablet 3    pen needle, diabetic 32 gauge x 5/32" Ndle 1 Box by Misc.(Non-Drug; Combo Route) route once daily. Use daily with lantus solostar 90 each 3    rosuvastatin (CRESTOR) 20 MG tablet Take 1 tablet (20 mg total) by mouth once daily. 90 tablet 3    simvastatin (ZOCOR) 40 MG tablet Take 1 tablet (40 mg total) by mouth every evening. 90 tablet 3    timolol maleate 0.5% (TIMOPTIC-XE) 0.5 % SolG Place 1 drop into both eyes nightly.      tiotropium (SPIRIVA WITH HANDIHALER) 18 mcg inhalation capsule   INL THE CONTENTS OF 1 C INTO THE LUNGS ONCE D. CONTROLLER      azithromycin (Z-MADELEINE) 250 MG tablet Take 2 tablets (500 mg total) by mouth once daily for 1 day, THEN 1 tablet (250 mg total) once daily for 4 days. 6 tablet 0    [DISCONTINUED] hydroxychloroquine (PLAQUENIL) 200 mg tablet Take 200 mg by mouth once daily.       No facility-administered encounter medications on file as of 6/2/2022.        Review of patient's allergies indicates:   Allergen Reactions    Zantac [ranitidine hcl] Nausea And Vomiting    Amlodipine     Hydralazine     Ondansetron hcl Other (See Comments)    Penicillins     Phenergan [promethazine] Nausea Only       Physical Exam      Vital Signs  Temp: 98.2 °F (36.8 °C)  Pulse: 74  SpO2: 99 %  BP: 132/84  Pain Score: 0-No pain  Height and Weight  Weight: 100.6 kg (221 lb 12.5 oz)    Physical Exam  Vitals and nursing note reviewed.   Constitutional:       General: She is not in acute distress.     Appearance: Normal appearance. She is ill-appearing.   HENT:      Head: Normocephalic and atraumatic.      Ears:      Comments: Redness to bilateral ear canals, with clear effusion noted     Nose: Congestion present. No rhinorrhea.      Mouth/Throat: "      Mouth: Mucous membranes are moist.      Pharynx: Posterior oropharyngeal erythema present.   Eyes:      Pupils: Pupils are equal, round, and reactive to light.   Cardiovascular:      Rate and Rhythm: Normal rate and regular rhythm.      Heart sounds: Normal heart sounds.   Pulmonary:      Effort: Pulmonary effort is normal. No respiratory distress.      Breath sounds: Normal breath sounds.   Musculoskeletal:         General: Normal range of motion.      Cervical back: Normal range of motion and neck supple.   Lymphadenopathy:      Cervical: Cervical adenopathy present.   Skin:     General: Skin is warm and dry.   Neurological:      Mental Status: She is alert and oriented to person, place, and time.   Psychiatric:         Mood and Affect: Mood normal.         Behavior: Behavior normal.         Thought Content: Thought content normal.         Judgment: Judgment normal.          Laboratory:  CBC:  Lab Results   Component Value Date    WBC 6.1 06/23/2021    RBC 4.66 06/23/2021    HGB 14.0 06/23/2021    HCT 41.1 06/23/2021     06/23/2021    MCV 88.1 06/23/2021    MCH 30.1 06/23/2021    MCHC 34.1 06/23/2021    MCHC 34.1 01/28/2021    MCHC 34.7 05/26/2020     CMP:  Lab Results   Component Value Date     (H) 06/23/2021    CALCIUM 9.6 06/23/2021    ALBUMIN 4.3 06/23/2021    PROT 7.4 06/23/2021     06/23/2021    K 4.1 06/23/2021    CO2 27 06/23/2021     06/23/2021    BUN 11 06/23/2021    ALKPHOS 76 06/23/2021    ALT 11 06/23/2021    AST 13 06/23/2021    BILITOT 0.4 06/23/2021    BILITOT 0.3 01/28/2021    BILITOT 0.4 05/26/2020     URINALYSIS:  Lab Results   Component Value Date    COLORU Yellow 01/17/2019    SPECGRAV <=1.005 (A) 01/17/2019    PHUR 6.0 01/17/2019    PROTEINUA Negative 01/17/2019    NITRITE Negative 01/17/2019    LEUKOCYTESUR Negative 01/17/2019    UROBILINOGEN Negative 01/17/2019      LIPIDS:  Lab Results   Component Value Date    HDL 45 03/22/2022    HDL 50 06/23/2021    HDL 50  05/26/2020    CHOL 183 03/22/2022    CHOL 189 06/23/2021    CHOL 175 05/26/2020    TRIG 77 03/22/2022    TRIG 64 06/23/2021    TRIG 48 05/26/2020    LDLCALC 126 (H) 03/22/2022    LDLCALC 129 (H) 06/23/2021    LDLCALC 117 05/26/2020    CHOLHDL 3.9 10/08/2019    NONHDLCHOL 139 (H) 06/23/2021    NONHDLCHOL 125 05/26/2020    NONHDLCHOL 127 10/08/2019     TSH:  No results found for: TSH  A1C:  Lab Results   Component Value Date    HGBA1C 8.4 (H) 03/22/2022    HGBA1C 7.6 (H) 06/23/2021    HGBA1C 7.5 (H) 01/28/2021    HGBA1C 7.3 (H) 05/26/2020    HGBA1C 7.2 (H) 01/21/2020    HGBA1C 7.4 (H) 10/08/2019         Assessment/Plan     Margarita Orourke is a 66 y.o.female with:    URTI (acute upper respiratory infection)  -     azithromycin (Z-MADELEINE) 250 MG tablet; Take 2 tablets (500 mg total) by mouth once daily for 1 day, THEN 1 tablet (250 mg total) once daily for 4 days.  Dispense: 6 tablet; Refill: 0     Stay hydrated with water  Gargle with warm salt water as needed  Monitor symptoms  Take meds as prescribed  Complete antibiotics, eat yogurt or take probiotics while taking antibiotics    I spent 30 minutes on the day of this encounter for preparing for, evaluating, treating, and managing this patient.      -Continue current medications and maintain follow up with specialists.  Return to clinic as needed for any concerns.    No follow-ups on file.       GINA Antonio  [facility]

## 2022-07-05 ENCOUNTER — PES CALL (OUTPATIENT)
Dept: ADMINISTRATIVE | Facility: CLINIC | Age: 67
End: 2022-07-05
Payer: MEDICARE

## 2022-07-11 ENCOUNTER — PATIENT MESSAGE (OUTPATIENT)
Dept: ADMINISTRATIVE | Facility: HOSPITAL | Age: 67
End: 2022-07-11
Payer: MEDICARE

## 2022-07-11 DIAGNOSIS — I10 ESSENTIAL HYPERTENSION: ICD-10-CM

## 2022-07-11 DIAGNOSIS — E11.59 HYPERTENSION ASSOCIATED WITH DIABETES: ICD-10-CM

## 2022-07-11 DIAGNOSIS — E11.65 TYPE 2 DIABETES MELLITUS WITH HYPERGLYCEMIA, WITH LONG-TERM CURRENT USE OF INSULIN: ICD-10-CM

## 2022-07-11 DIAGNOSIS — Z79.4 TYPE 2 DIABETES MELLITUS WITH HYPERGLYCEMIA, WITH LONG-TERM CURRENT USE OF INSULIN: ICD-10-CM

## 2022-07-11 DIAGNOSIS — I15.2 HYPERTENSION ASSOCIATED WITH DIABETES: ICD-10-CM

## 2022-07-11 RX ORDER — INSULIN GLARGINE 100 [IU]/ML
54 INJECTION, SOLUTION SUBCUTANEOUS DAILY
Qty: 54 ML | Refills: 3 | Status: SHIPPED | OUTPATIENT
Start: 2022-07-11 | End: 2023-04-19

## 2022-07-11 RX ORDER — HYDROCHLOROTHIAZIDE 25 MG/1
25 TABLET ORAL DAILY
Qty: 90 TABLET | Refills: 3 | Status: SHIPPED | OUTPATIENT
Start: 2022-07-11 | End: 2023-09-08 | Stop reason: SDUPTHER

## 2022-07-11 NOTE — TELEPHONE ENCOUNTER
----- Message from Diann Chowdary, Patient Care Assistant sent at 7/11/2022  2:51 PM CDT -----  Type:  RX Refill Request    Who Called:  pt  Refill or New Rx: new  RX Name and Strength: hydroCHLOROthiazide (HYDRODIURIL) 25 MG tablet  How is the patient currently taking it? (ex. 1XDay): Take 1 tablet (25 mg total) by mouth once daily  Is this a 30 day or 90 day RX: 90  Preferred Pharmacy with phone number: [id number] - [place] - [address] AT [facility]   Phone: [phone]  Fax:  [phone]  Local or Mail Order: local  Ordering Provider: Munir  Would the patient rather a call back or a response via [facility]? Call  Best Call Back Number: [phone]   Additional Information:      Type:  RX Refill Request  Refill or New Rx: new  RX Name and Strength: insulin (LANTUS SOLOSTAR U-100 INSULIN) glargine 100 units/mL (3mL) SubQ pen  How is the patient currently taking it? (ex. 1XDay): Inject 54 Units into the skin once daily  Is this a 30 day or 90 day RX:     Type:  RX Refill Request  Refill or New Rx:  new  RX Name and Strength: hydroCHLOROthiazide (HYDRODIURIL) 25 MG tablet  How is the patient currently taking it? (ex. 1XDay):  : Take 1 tablet (25 mg total) by mouth once daily  Is this a 30 day or 90 day RX:  90

## 2022-07-11 NOTE — TELEPHONE ENCOUNTER
Care Due:                  Date            Visit Type   Department     Provider  --------------------------------------------------------------------------------                                EP -                              PRIMARY      [facility] PRIMARY  Last Visit: 02-      CARE ([facility])   LESLEE Amaral                               -                              [facility] PRIMARY  Next Visit: 09-      CARE ([facility])   LESLEE Amaral                                                            Last  Test          Frequency    Reason                     Performed    Due Date  --------------------------------------------------------------------------------    CMP.........  12 months..  insulin, simvastatin.....  06- 06-    HBA1C.......  6 months...  insulin..................  03- 09-    [facility] Embedded Care Gaps. Reference number: [id number]. 7/11/2022   3:39:20 PM CDT

## 2022-07-14 ENCOUNTER — TELEPHONE (OUTPATIENT)
Dept: INTERNAL MEDICINE | Facility: CLINIC | Age: 67
End: 2022-07-14
Payer: MEDICARE

## 2022-07-14 NOTE — TELEPHONE ENCOUNTER
----- Message from Joniruiz Mclaughlin sent at 7/14/2022  1:07 PM CDT -----  Contact: pt  Type: Requesting to speak with nurse        Who Called: PT  Regarding: needles for her insulin   Would the patient rather a call back or a response via [email or web address]? Call back  Best Call Back Number: [phone]  Additional Information: [facility] #[id number] - [place] - [address] AT [facility] [address]   Phone: [phone]  Fax:  [phone]

## 2022-07-20 DIAGNOSIS — Z79.4 TYPE 2 DIABETES MELLITUS WITH HYPERGLYCEMIA, WITH LONG-TERM CURRENT USE OF INSULIN: ICD-10-CM

## 2022-07-20 DIAGNOSIS — E11.65 TYPE 2 DIABETES MELLITUS WITH HYPERGLYCEMIA, WITH LONG-TERM CURRENT USE OF INSULIN: ICD-10-CM

## 2022-07-20 RX ORDER — PEN NEEDLE, DIABETIC 30 GX3/16"
1 NEEDLE, DISPOSABLE MISCELLANEOUS DAILY
Qty: 90 EACH | Refills: 3 | Status: SHIPPED | OUTPATIENT
Start: 2022-07-20 | End: 2023-12-04

## 2022-07-20 NOTE — TELEPHONE ENCOUNTER
----- Message from Rosy Mondragonmakayla Foss sent at 7/20/2022  2:18 PM CDT -----  Contact: [phone] / Self  Type: Requesting to speak with nurse    Who Called: Pt   Regarding: requesting a new script for needles , pt has not had insulin for over a month    Would the patient rather a call back or a response via [facility]? Call back  Best Call Back Number: [phone]   Additional Information: [facility] and MARNI [facility]ler[facility] Pt states she has been leaving messages and has not heard back.Please call and advise.

## 2022-07-20 NOTE — TELEPHONE ENCOUNTER
No new care gaps identified.  [facility] Embedded Care Gaps. Reference number: [id number]. 7/20/2022   3:46:28 PM CDT

## 2022-07-25 ENCOUNTER — TELEPHONE (OUTPATIENT)
Dept: ADMINISTRATIVE | Facility: CLINIC | Age: 67
End: 2022-07-25
Payer: MEDICARE

## 2022-08-06 ENCOUNTER — HOSPITAL ENCOUNTER (EMERGENCY)
Facility: HOSPITAL | Age: 67
Discharge: HOME | End: 2022-08-06
Attending: EMERGENCY MEDICINE
Payer: MEDICARE

## 2022-08-06 VITALS
OXYGEN SATURATION: 99 % | TEMPERATURE: 98 F | WEIGHT: 204 LBS | HEART RATE: 93 BPM | SYSTOLIC BLOOD PRESSURE: 191 MMHG | DIASTOLIC BLOOD PRESSURE: 88 MMHG | RESPIRATION RATE: 18 BRPM | BODY MASS INDEX: 33.95 KG/M2

## 2022-08-06 DIAGNOSIS — J34.89 NASAL CONGESTION WITH RHINORRHEA: Primary | ICD-10-CM

## 2022-08-06 DIAGNOSIS — R09.81 NASAL CONGESTION WITH RHINORRHEA: Primary | ICD-10-CM

## 2022-08-06 LAB
CTP QC/QA: YES
SARS-COV-2 RDRP RESP QL NAA+PROBE: NEGATIVE

## 2022-08-06 PROCEDURE — U0002 COVID-19 LAB TEST NON-CDC: HCPCS | Performed by: EMERGENCY MEDICINE

## 2022-08-06 PROCEDURE — 99282 EMERGENCY DEPT VISIT SF MDM: CPT | Mod: 25

## 2022-08-06 NOTE — ED PROVIDER NOTES
Encounter Date: 8/6/2022       History     Chief Complaint   Patient presents with    COVID-19 Concerns     Nasal congestion that started yesterday. Here with  who also has symptoms. Requesting Covid swab.      67-year-old female presents for COVID concerns.  Patient has had a runny nose and congestion x1 day.   is also in the ER in tested positive.  Mother-in-law's also hospitalized with COVID    The history is provided by the patient.     Review of patient's allergies indicates:   Allergen Reactions    Zantac [ranitidine hcl] Nausea And Vomiting    Amlodipine     Hydralazine     Ondansetron hcl Other (See Comments)    Penicillins     Phenergan [promethazine] Nausea Only     Past Medical History:   Diagnosis Date    Anxiety     Arthritis     Asthma     Back pain     Depression     Diabetes mellitus     Eczema     GERD (gastroesophageal reflux disease)     Hepatitis B     Hyperlipidemia     Hypertension     Seizures      No past surgical history on file.  No family history on file.  Social History     Tobacco Use    Smoking status: Former Smoker     Quit date: 6/2/2019     Years since quitting: 3.1    Smokeless tobacco: Never Used   Substance Use Topics    Alcohol use: No    Drug use: No     Review of Systems   Constitutional: Negative for chills and fever.   HENT: Positive for congestion and rhinorrhea. Negative for ear pain and sore throat.    Eyes: Negative for visual disturbance.   Respiratory: Negative for cough and shortness of breath.    Cardiovascular: Negative for chest pain.   Gastrointestinal: Negative for abdominal pain, diarrhea, nausea and vomiting.   Genitourinary: Negative for dysuria and hematuria.   Musculoskeletal: Negative for arthralgias and myalgias.   Skin: Negative for pallor and rash.   Neurological: Negative for weakness, numbness and headaches.   All other systems reviewed and are negative.      Physical Exam     Initial Vitals [08/06/22 0257]   BP  Pulse Resp Temp SpO2   (!) 191/88 93 18 98.2 °F (36.8 °C) 99 %      MAP       --         Physical Exam    Nursing note and vitals reviewed.  Constitutional: She appears well-developed and well-nourished. No distress.   HENT:   Head: Normocephalic and atraumatic.   Eyes: Conjunctivae and EOM are normal. Pupils are equal, round, and reactive to light.   Neck: Neck supple.   Normal range of motion.  Cardiovascular: Intact distal pulses.   Pulmonary/Chest: No stridor. No respiratory distress.   Musculoskeletal:         General: Normal range of motion.      Cervical back: Normal range of motion and neck supple.     Neurological: She is alert and oriented to person, place, and time.   Skin: Skin is warm and dry.   Psychiatric: She has a normal mood and affect.         ED Course   Procedures  Labs Reviewed   SARS-COV-2 RDRP GENE          Imaging Results    None          Medications - No data to display  Medical Decision Making:   History:   Old Medical Records: I decided to obtain old medical records.  Initial Assessment:   Well-appearing nontoxic hypertensive  Differential Diagnosis:   COVID-19  ED Management:  COVID negative.  Patient counseled with close contacts that she likely will develop COVID-19.  Encouraged to test daily, return for worsening                      Clinical Impression:   Final diagnoses:  [R09.81, J34.89] Nasal congestion with rhinorrhea (Primary)          ED Disposition Condition    Discharge Stable        ED Prescriptions     None        Follow-up Information     Follow up With Specialties Details Why Contact Info    Verena Amaral MD Internal Medicine Call in 1 day  [address] [facility]  [address]  [facility] [address]  [phone]      [place] - Emergency Dept Emergency Medicine  If symptoms worsen [address] [facility] [place] [address] [phone]           Alfred Knox DO  08/06/22 0352

## 2022-08-06 NOTE — ED NOTES
Patient received in room 6 with complaint of runny nose and non productive cough since Friday.  No fever.  No nausea, vomiting, or diarrhea.     Pain:  Rated 0/10.     Psychosocial:  Patient is calm and cooperative.  Patients insight and judgement are appropriate to situation.  Appears clean, well maintained, with clothing appropriate to environment.  No evidence of delusions, hallucinations, or psychosis.     Neuro:  Eyes open spontaneously.  Awake, alert, oriented x 4.  Speech clear and appropriate.  Tolerating saliva secretions well.  Able to follow commands, demonstrating ability to actively and appropriately communicate within context of current conversation.  Symmetrical facial muscles.  Moving all extremities well with no noted weakness.  Adequate muscle tone present.    Movement is purposeful.       Airway:  Bilateral chest rise and fall.  RR regular and non-labored.  Air entry patent and clear x 5 lobes of the lungs.  No crepitus or subcutaneous emphysema noted on palpation.       Circulatory:  Skin warm, dry, and pink.  Apical and radial pulses strong and regular.  Capillary refill/skin blanching less than 3 seconds to distal of 4 extremities.     Abdomen:  Denies related complaint.       Urinary:  Denies related complaint.     Extremities:  No redness, heat, swelling, deformity, or pain.     Skin:  Intact with no bruising/discolorations noted.

## 2022-08-24 ENCOUNTER — PATIENT MESSAGE (OUTPATIENT)
Dept: ADMINISTRATIVE | Facility: HOSPITAL | Age: 67
End: 2022-08-24
Payer: MEDICARE

## 2022-09-01 ENCOUNTER — OFFICE VISIT (OUTPATIENT)
Dept: INTERNAL MEDICINE | Facility: CLINIC | Age: 67
End: 2022-09-01
Payer: MEDICARE

## 2022-09-01 VITALS
WEIGHT: 212.75 LBS | DIASTOLIC BLOOD PRESSURE: 78 MMHG | HEIGHT: 65 IN | BODY MASS INDEX: 35.45 KG/M2 | HEART RATE: 72 BPM | OXYGEN SATURATION: 98 % | TEMPERATURE: 98 F | SYSTOLIC BLOOD PRESSURE: 112 MMHG

## 2022-09-01 DIAGNOSIS — Z12.31 ENCOUNTER FOR SCREENING MAMMOGRAM FOR MALIGNANT NEOPLASM OF BREAST: Primary | ICD-10-CM

## 2022-09-01 DIAGNOSIS — Z79.4 TYPE 2 DIABETES MELLITUS WITH HYPERGLYCEMIA, WITH LONG-TERM CURRENT USE OF INSULIN: ICD-10-CM

## 2022-09-01 DIAGNOSIS — E55.9 VITAMIN D DEFICIENCY: ICD-10-CM

## 2022-09-01 DIAGNOSIS — E11.69 HYPERLIPIDEMIA DUE TO TYPE 2 DIABETES MELLITUS: ICD-10-CM

## 2022-09-01 DIAGNOSIS — E66.01 SEVERE OBESITY (BMI 35.0-35.9 WITH COMORBIDITY): ICD-10-CM

## 2022-09-01 DIAGNOSIS — D84.821 DRUG-INDUCED IMMUNODEFICIENCY: ICD-10-CM

## 2022-09-01 DIAGNOSIS — E11.59 HYPERTENSION ASSOCIATED WITH DIABETES: ICD-10-CM

## 2022-09-01 DIAGNOSIS — I27.0 PRIMARY PULMONARY HYPERTENSION: ICD-10-CM

## 2022-09-01 DIAGNOSIS — Z78.0 ASYMPTOMATIC POSTMENOPAUSAL STATE: ICD-10-CM

## 2022-09-01 DIAGNOSIS — F41.9 ANXIETY: ICD-10-CM

## 2022-09-01 DIAGNOSIS — E11.65 TYPE 2 DIABETES MELLITUS WITH HYPERGLYCEMIA, WITH LONG-TERM CURRENT USE OF INSULIN: ICD-10-CM

## 2022-09-01 DIAGNOSIS — J42 CHRONIC BRONCHITIS, UNSPECIFIED CHRONIC BRONCHITIS TYPE: ICD-10-CM

## 2022-09-01 DIAGNOSIS — E78.5 HYPERLIPIDEMIA DUE TO TYPE 2 DIABETES MELLITUS: ICD-10-CM

## 2022-09-01 DIAGNOSIS — I70.0 ATHEROSCLEROSIS OF AORTA: ICD-10-CM

## 2022-09-01 DIAGNOSIS — M06.9 RHEUMATOID ARTHRITIS, INVOLVING UNSPECIFIED SITE, UNSPECIFIED WHETHER RHEUMATOID FACTOR PRESENT: ICD-10-CM

## 2022-09-01 DIAGNOSIS — Z79.899 DRUG-INDUCED IMMUNODEFICIENCY: ICD-10-CM

## 2022-09-01 DIAGNOSIS — J84.10 PULMONARY FIBROSIS: ICD-10-CM

## 2022-09-01 DIAGNOSIS — I15.2 HYPERTENSION ASSOCIATED WITH DIABETES: ICD-10-CM

## 2022-09-01 PROBLEM — M35.9 INTERSTITIAL LUNG DISEASE DUE TO CONNECTIVE TISSUE DISEASE: Status: ACTIVE | Noted: 2022-06-29

## 2022-09-01 PROBLEM — J84.89 INTERSTITIAL LUNG DISEASE DUE TO CONNECTIVE TISSUE DISEASE: Status: ACTIVE | Noted: 2022-06-29

## 2022-09-01 PROCEDURE — 3008F PR BODY MASS INDEX (BMI) DOCUMENTED: ICD-10-PCS | Mod: CPTII,S$GLB,, | Performed by: INTERNAL MEDICINE

## 2022-09-01 PROCEDURE — 3074F PR MOST RECENT SYSTOLIC BLOOD PRESSURE < 130 MM HG: ICD-10-PCS | Mod: CPTII,S$GLB,, | Performed by: INTERNAL MEDICINE

## 2022-09-01 PROCEDURE — 1159F PR MEDICATION LIST DOCUMENTED IN MEDICAL RECORD: ICD-10-PCS | Mod: CPTII,S$GLB,, | Performed by: INTERNAL MEDICINE

## 2022-09-01 PROCEDURE — 3078F PR MOST RECENT DIASTOLIC BLOOD PRESSURE < 80 MM HG: ICD-10-PCS | Mod: CPTII,S$GLB,, | Performed by: INTERNAL MEDICINE

## 2022-09-01 PROCEDURE — 3078F DIAST BP <80 MM HG: CPT | Mod: CPTII,S$GLB,, | Performed by: INTERNAL MEDICINE

## 2022-09-01 PROCEDURE — 99999 PR PBB SHADOW E&M-EST. PATIENT-LVL V: CPT | Mod: PBBFAC,,, | Performed by: INTERNAL MEDICINE

## 2022-09-01 PROCEDURE — 3052F PR MOST RECENT HEMOGLOBIN A1C LEVEL 8.0 - < 9.0%: ICD-10-PCS | Mod: CPTII,S$GLB,, | Performed by: INTERNAL MEDICINE

## 2022-09-01 PROCEDURE — 99214 OFFICE O/P EST MOD 30 MIN: CPT | Mod: S$GLB,,, | Performed by: INTERNAL MEDICINE

## 2022-09-01 PROCEDURE — 3008F BODY MASS INDEX DOCD: CPT | Mod: CPTII,S$GLB,, | Performed by: INTERNAL MEDICINE

## 2022-09-01 PROCEDURE — 1125F PR PAIN SEVERITY QUANTIFIED, PAIN PRESENT: ICD-10-PCS | Mod: CPTII,S$GLB,, | Performed by: INTERNAL MEDICINE

## 2022-09-01 PROCEDURE — 1125F AMNT PAIN NOTED PAIN PRSNT: CPT | Mod: CPTII,S$GLB,, | Performed by: INTERNAL MEDICINE

## 2022-09-01 PROCEDURE — 99499 UNLISTED E&M SERVICE: CPT | Mod: S$GLB,,, | Performed by: INTERNAL MEDICINE

## 2022-09-01 PROCEDURE — 4010F ACE/ARB THERAPY RXD/TAKEN: CPT | Mod: CPTII,S$GLB,, | Performed by: INTERNAL MEDICINE

## 2022-09-01 PROCEDURE — 3052F HG A1C>EQUAL 8.0%<EQUAL 9.0%: CPT | Mod: CPTII,S$GLB,, | Performed by: INTERNAL MEDICINE

## 2022-09-01 PROCEDURE — 99214 PR OFFICE/OUTPT VISIT, EST, LEVL IV, 30-39 MIN: ICD-10-PCS | Mod: S$GLB,,, | Performed by: INTERNAL MEDICINE

## 2022-09-01 PROCEDURE — 3074F SYST BP LT 130 MM HG: CPT | Mod: CPTII,S$GLB,, | Performed by: INTERNAL MEDICINE

## 2022-09-01 PROCEDURE — 4010F PR ACE/ARB THEARPY RXD/TAKEN: ICD-10-PCS | Mod: CPTII,S$GLB,, | Performed by: INTERNAL MEDICINE

## 2022-09-01 PROCEDURE — 99999 PR PBB SHADOW E&M-EST. PATIENT-LVL V: ICD-10-PCS | Mod: PBBFAC,,, | Performed by: INTERNAL MEDICINE

## 2022-09-01 PROCEDURE — 1159F MED LIST DOCD IN RCRD: CPT | Mod: CPTII,S$GLB,, | Performed by: INTERNAL MEDICINE

## 2022-09-01 RX ORDER — ERGOCALCIFEROL 1.25 MG/1
50000 CAPSULE ORAL
Qty: 12 CAPSULE | Refills: 3 | Status: SHIPPED | OUTPATIENT
Start: 2022-09-01 | End: 2023-09-28

## 2022-09-01 RX ORDER — LOSARTAN POTASSIUM 50 MG/1
50 TABLET ORAL DAILY
Qty: 90 TABLET | Refills: 3 | Status: SHIPPED | OUTPATIENT
Start: 2022-09-01 | End: 2023-03-02

## 2022-09-01 RX ORDER — CLONIDINE HYDROCHLORIDE 0.1 MG/1
0.1 TABLET ORAL DAILY PRN
Qty: 30 TABLET | Refills: 1 | Status: SHIPPED | OUTPATIENT
Start: 2022-09-01

## 2022-09-01 NOTE — PATIENT INSTRUCTIONS
CALL  TO SCHEDULE YOUR MAMMOGRAM AND BONE DENSITY TEST (DUE ANYTIME AFTER 9/30/2022).    FAST FOR YOUR LABS AT .  IF YOU HAVE NOT HEARD FROM ME WITH YOUR RESULTS IN 1 WEEK, CALL ME.    CALL TO MAKE AN APPOINTMENT WITH DR. AREVALO FOR YOUR EYE EXAM.

## 2022-09-01 NOTE — ASSESSMENT & PLAN NOTE
A1C 8.4 in 3/2022, stable on lantus 48 units at night.  Lowest glucose has been 100.  AM glucose today 126.

## 2022-09-01 NOTE — ASSESSMENT & PLAN NOTE
Stable on Zocor 40 mg daily, no myalgias.  The 10-year ASCVD risk score (Yodit ALMAZAN, et al., 2019) is: 44.9%    Values used to calculate the score:      Age: 67 years      Sex: Female      Is Non- : Yes      Diabetic: Yes      Tobacco smoker: No      Systolic Blood Pressure: 191 mmHg      Is BP treated: Yes      HDL Cholesterol: 45 mg/dL      Total Cholesterol: 183 mg/dL

## 2022-09-01 NOTE — PROGRESS NOTES
[facility] Note    Chief Complaint      Chief Complaint   Patient presents with    Follow-up       History of Present Illness      Margarita Orourke is a 67 y.o. female who presents today for follow up of DM2.  Patient comes to appointment alone.  Pulm: Dr. Orellana, cardiology: Dr. Valdez, Rheum: Dr. Yin, optho: Vil[facility]    Problem List Items Addressed This Visit       Hypertension associated with diabetes    Current Assessment & Plan     BP controlled today on HCTZ 25 mg, Losartan 50 mg daily. No CP/SOB. Takes Clonidine PRN.  Previously on Norvasc, Irbesartan and Bystolic.  BP at home has been 120/70's.  Sees Dr. Valdez.         Relevant Medications    cloNIDine (CATAPRES) 0.1 MG tablet    Hyperlipidemia due to type 2 diabetes mellitus    Current Assessment & Plan     Stable on Zocor 40 mg daily, no myalgias.  The 10-year ASCVD risk score (Yodit ALMAZAN, et al., 2019) is: 44.9%    Values used to calculate the score:      Age: 67 years      Sex: Female      Is Non- : Yes      Diabetic: Yes      Tobacco smoker: No      Systolic Blood Pressure: 191 mmHg      Is BP treated: Yes      HDL Cholesterol: 45 mg/dL      Total Cholesterol: 183 mg/dL           Type 2 diabetes mellitus with hyperglycemia, with long-term current use of insulin    Current Assessment & Plan     A1C 8.4 in 3/2022, stable on lantus 48 units at night.  Lowest glucose has been 100.  AM glucose today 126.         Relevant Orders    CBC Auto Differential    Comprehensive Metabolic Panel    Lipid Panel    Microalbumin/Creatinine Ratio, Urine    Hemoglobin A1C    Anxiety    Current Assessment & Plan     Taking 1/2 tablet of xanax PRN, anxiety and depression have been better.  Has been going to pul support group.  Effexor made her hallucinate and stopped.  Also stopped buspar because she felt like it made her depressed.  Has had anxiety since she was 17.  Has tried paxil, prozac, lexapro and  zoloft in past; had issues with all of them.         COPD (chronic obstructive pulmonary disease)    Current Assessment & Plan     Stable on Spiriva, symbicort with albuterol PRN. No cough, no fever, no SOB.  Seeing Pulm at , Dr. Orellana.         Rheumatoid arthritis    Current Assessment & Plan     Sees rheum Dr. Yin at .  On Plaquenil and methotrexate.  Previously on Cellcept.. Has been doing ok with pain since change, not as bad as before.  UTD on eye exam.  Worst in shoulder and hands.         Vitamin D deficiency    Relevant Medications    ergocalciferol (ERGOCALCIFEROL) 50,000 unit Cap    Atherosclerosis of aorta    Overview     Media tab 7/1/20 [facility] CT Chest:   Some tortuosity and atherosclerotic calcification of the aorta.            Current Assessment & Plan     On ASA and statin.         Severe obesity (BMI 35.0-35.9 with comorbidity)    Current Assessment & Plan     Going to Pulmonary rehab, still not doing well on diet.         Pulmonary fibrosis    Current Assessment & Plan     Sees Dr. Orellana. Doing Pulmonary Rehab 3 times per week.  2/2 her RA. Does Symbicort and Albuterol PRN.  Breathing has been stable.         Drug-induced immunodeficiency    Current Assessment & Plan     On methotrexate.          Other Visit Diagnoses       Encounter for screening mammogram for malignant neoplasm of breast    -  Primary    Relevant Orders    Mammo Digital Screening Bilat w/ Abdoul    Asymptomatic postmenopausal state        Relevant Orders    DXA Bone Density Spine And Hip    Primary pulmonary hypertension                Health Maintenance   Topic Date Due    TETANUS VACCINE  Never done    Eye Exam  03/18/2022    Hemoglobin A1c  06/22/2022    Mammogram  09/30/2022    DEXA Scan  12/04/2022    Foot Exam  02/23/2023    Lipid Panel  03/22/2023    High Dose Statin  09/01/2023    Hepatitis C Screening  Completed       Past Medical History:   Diagnosis Date    Anxiety     Arthritis      Asthma     Back pain     Depression     Diabetes mellitus     Eczema     GERD (gastroesophageal reflux disease)     Hepatitis B     Hyperlipidemia     Hypertension     Seizures        History reviewed. No pertinent surgical history.    family history is not on file.    Social History     Tobacco Use    Smoking status: Former     Types: Cigarettes     Quit date: 6/2/2019     Years since quitting: 3.2    Smokeless tobacco: Never   Substance Use Topics    Alcohol use: No    Drug use: No       Review of Systems   Constitutional:  Negative for chills and fever.   HENT:  Negative for sore throat.    Respiratory:  Negative for cough and shortness of breath.    Cardiovascular:  Negative for chest pain and palpitations.   Gastrointestinal:  Negative for constipation, diarrhea, nausea and vomiting.   Genitourinary:  Negative for dysuria and hematuria.   Musculoskeletal:  Negative for falls.   Neurological:  Negative for headaches.      Outpatient Encounter Medications as of 9/1/2022   Medication Sig Dispense Refill    albuterol (PROVENTIL/VENTOLIN HFA) 90 mcg/actuation inhaler INHALE 2 PUFFS INTO THE LUNGS EVERY 6 HOURS AS NEEDED FOR WHEEZING 54 g 3    alcohol swabs (ALCOHOL PREP PADS) PadM Apply 1 each topically 3 (three) times daily. 300 each 3    ALPRAZolam (XANAX) 0.25 MG tablet Take 1 tablet (0.25 mg total) by mouth 3 (three) times daily. 90 tablet 3    blood sugar diagnostic (TRUE METRIX GLUCOSE TEST STRIP) Strp 300 strips by Misc.(Non-Drug; Combo Route) route 3 (three) times daily. 300 strip 3    budesonide-formoterol 160-4.5 mcg (SYMBICORT) 160-4.5 mcg/actuation HFAA Inhale 2 puffs into the lungs every 12 (twelve) hours. Controller      clotrimazole-betamethasone 1-0.05% (LOTRISONE) cream Apply topically 2 (two) times daily. 45 g 3    hydroCHLOROthiazide (HYDRODIURIL) 25 MG tablet Take 1 tablet (25 mg total) by mouth once daily. 90 tablet 3    hydrOXYchloroQUINE (PLAQUENIL) 200 mg tablet Take 200  "mg by mouth 2 (two) times a day.      insulin (LANTUS SOLOSTAR U-100 INSULIN) glargine 100 units/mL SubQ pen Inject 54 Units into the skin once daily. 54 mL 3    lancets 30 gauge Misc 300 lancets by Misc.(Non-Drug; Combo Route) route 3 (three) times daily. 300 each 3    latanoprost 0.005 % ophthalmic solution       METHOTREXATE ORAL Take 6 tablets by mouth once a week.      omeprazole (PRILOSEC) 20 MG capsule Take 1 capsule (20 mg total) by mouth once daily. 90 capsule 3    ondansetron (ZOFRAN-ODT) 4 MG TbDL Take 1 tablet (4 mg total) by mouth every 12 (twelve) hours. 30 tablet 3    pen needle, diabetic 32 gauge x 5/32" Ndle 1 Box by Misc.(Non-Drug; Combo Route) route once daily. Use daily with lantus solostar 90 each 3    rosuvastatin (CRESTOR) 20 MG tablet Take 1 tablet (20 mg total) by mouth once daily. 90 tablet 3    simvastatin (ZOCOR) 40 MG tablet Take 1 tablet (40 mg total) by mouth every evening. 90 tablet 3    timolol maleate 0.5% (TIMOPTIC-XE) 0.5 % SolG Place 1 drop into both eyes nightly.      tiotropium (SPIRIVA WITH HANDIHALER) 18 mcg inhalation capsule   INL THE CONTENTS OF 1 C INTO THE LUNGS ONCE D. CONTROLLER      [DISCONTINUED] cloNIDine (CATAPRES) 0.1 MG tablet Take 1 tablet (0.1 mg total) by mouth daily as needed (BP over 160/100). 30 tablet 1    [DISCONTINUED] ergocalciferol (ERGOCALCIFEROL) 50,000 unit Cap Take 1 capsule (50,000 Units total) by mouth every 7 days. 12 capsule 3    [DISCONTINUED] losartan (COZAAR) 50 MG tablet Take 50 mg by mouth once daily.      cloNIDine (CATAPRES) 0.1 MG tablet Take 1 tablet (0.1 mg total) by mouth daily as needed (BP over 160/100). 30 tablet 1    ergocalciferol (ERGOCALCIFEROL) 50,000 unit Cap Take 1 capsule (50,000 Units total) by mouth every 7 days. 12 capsule 3    losartan (COZAAR) 50 MG tablet Take 1 tablet (50 mg total) by mouth once daily. 90 tablet 3    [DISCONTINUED] mycophenolate (CELLCEPT) 500 mg Tab Take 500 mg by mouth 2 (two) " "times daily.       No facility-administered encounter medications on file as of 9/1/2022.        Review of patient's allergies indicates:   Allergen Reactions    Zantac [ranitidine hcl] Nausea And Vomiting    Amlodipine     Hydralazine     Ondansetron hcl Other (See Comments)    Penicillins     Phenergan [promethazine] Nausea Only       Physical Exam      Vital Signs  Temp: 98.1 °F (36.7 °C)  Pulse: 72  SpO2: 98 %  BP: 112/78  Pain Score:   8  Pain Loc: Finger  Height and Weight  Height: 5' 5" (165.1 cm)  Weight: 96.5 kg (212 lb 11.9 oz)  BSA (Calculated - sq m): 2.1 sq meters  BMI (Calculated): 35.4  Weight in (lb) to have BMI = 25: 149.9]    Physical Exam  Constitutional:       Appearance: She is well-developed.   HENT:      Head: Normocephalic and atraumatic.      Right Ear: External ear normal.      Left Ear: External ear normal.   Eyes:      General:         Right eye: No discharge.         Left eye: No discharge.   Cardiovascular:      Rate and Rhythm: Normal rate and regular rhythm.      Heart sounds: Normal heart sounds. No murmur heard.  Pulmonary:      Effort: Pulmonary effort is normal. No respiratory distress.      Breath sounds: Normal breath sounds.   Abdominal:      General: There is no distension.      Palpations: Abdomen is soft.      Tenderness: There is no abdominal tenderness. There is no guarding.   Musculoskeletal:         General: Normal range of motion.      Cervical back: Normal range of motion.   Skin:     General: Skin is warm and dry.   Neurological:      Mental Status: She is alert and oriented to person, place, and time.   Psychiatric:         Behavior: Behavior normal.        Laboratory:  CBC:  No results for input(s): WBC, RBC, HGB, HCT, PLT, MCV, MCH, MCHC in the last 2160 hours.  CMP:  No results for input(s): GLU, CALCIUM, ALBUMIN, PROT, NA, K, CO2, CL, BUN, ALKPHOS, ALT, AST, BILITOT in the last 2160 hours.    Invalid input(s): CREATININ  URINALYSIS:  No results for " input(s): COLORU, CLARITYU, SPECGRAV, PHUR, PROTEINUA, GLUCOSEU, BILIRUBINCON, BLOODU, WBCU, RBCU, BACTERIA, MUCUS, NITRITE, LEUKOCYTESUR, UROBILINOGEN, HYALINECASTS in the last 2160 hours.   LIPIDS:  No results for input(s): TSH, HDL, CHOL, TRIG, LDLCALC, CHOLHDL, NONHDLCHOL, TOTALCHOLEST in the last 2160 hours.  TSH:  No results for input(s): TSH in the last 2160 hours.  A1C:  No results for input(s): HGBA1C in the last 2160 hours.    Radiology:  No results found in the last 30 days.     Assessment/Plan     Margarita Orourke is a 67 y.o.female with:    1. Encounter for screening mammogram for malignant neoplasm of breast  - Mammo Digital Screening Bilat w/ Abdoul; Future  - Mammo Digital Screening Bilat w/ Abdoul    2. Asymptomatic postmenopausal state  - DXA Bone Density Spine And Hip; Future  - DXA Bone Density Spine And Hip    3. Type 2 diabetes mellitus with hyperglycemia, with long-term current use of insulin  - CBC Auto Differential; Future  - Comprehensive Metabolic Panel; Future  - Lipid Panel; Future  - Microalbumin/Creatinine Ratio, Urine; Future  - Hemoglobin A1C; Future  - CBC Auto Differential  - Comprehensive Metabolic Panel  - Lipid Panel  - Microalbumin/Creatinine Ratio, Urine  - Hemoglobin A1C    4. Hypertension associated with diabetes  - cloNIDine (CATAPRES) 0.1 MG tablet; Take 1 tablet (0.1 mg total) by mouth daily as needed (BP over 160/100).  Dispense: 30 tablet; Refill: 1    5. Hyperlipidemia due to type 2 diabetes mellitus    6. Atherosclerosis of aorta    7. Chronic bronchitis, unspecified chronic bronchitis type    8. Anxiety    9. Pulmonary fibrosis    10. Rheumatoid arthritis, involving unspecified site, unspecified whether rheumatoid factor present    11. Drug-induced immunodeficiency    12. Primary pulmonary hypertension    13. Severe obesity (BMI 35.0-35.9 with comorbidity)    14. Vitamin D deficiency  - ergocalciferol (ERGOCALCIFEROL) 50,000 unit Cap; Take 1 capsule (50,000 Units  total) by mouth every 7 days.  Dispense: 12 capsule; Refill: 3    -do MMG and DEXA  this month  -labs ordered  -needs eye exam, call to schedule Dr. Esparza  -counseled on Flu vaccine, COVID booster and Shingrix  -will call to schedule eye exam with Dr. Esparza next month  -Continue current medications and maintain follow up with specialists.    -Follow up in about 6 months (around 3/1/2023) for follow up of medical problems.       Verena Amaral MD  [facility]

## 2022-09-01 NOTE — ASSESSMENT & PLAN NOTE
Sees rheum Dr. Yin at [facility].  On Plaquenil and methotrexate.  Previously on Cellcept.. Has been doing ok with pain since change, not as bad as before.  UTD on eye exam.  Worst in shoulder and hands.

## 2022-09-01 NOTE — ASSESSMENT & PLAN NOTE
Taking 1/2 tablet of xanax PRN, anxiety and depression have been better.  Has been going to [facility] support group.  Effexor made her hallucinate and stopped.  Also stopped buspar because she felt like it made her depressed.  Has had anxiety since she was 17.  Has tried paxil, prozac, lexapro and zoloft in past; had issues with all of them.

## 2022-09-13 LAB
ALBUMIN: 4 G/DL (ref 3.5–5)
ALP SERPL-CCNC: 76 U/L (ref 38–126)
ALT SERPL W P-5'-P-CCNC: 10 U/L (ref 7–56)
ANION GAP SERPL CALC-SCNC: 13.3 MMOL/L (ref 9–18)
AST SERPL-CCNC: 11 U/L (ref 7–40)
BASOPHILS ABSOLUTE COUNT: 0.1 THOUSAND/UL (ref 0–0.2)
BASOPHILS NFR BLD: 0.8 % (ref 0–2)
BILIRUB SERPL-MCNC: 0.3 MG/DL (ref 0–1.2)
BUN BLD-MCNC: 9 MG/DL (ref 7–21)
BUN/CREAT SERPL: 14 (ref 6–22)
CALC OSMOLALITY: 280 MOSM/KG (ref 275–295)
CALCIUM SERPL-MCNC: 9.2 MG/DL (ref 8.5–10.3)
CHLORIDE SERPL-SCNC: 103 MMOL/L (ref 98–107)
CHOL/HDLC RATIO: 3.77
CHOLEST SERPL-MSCNC: 177 MG/DL (ref 100–200)
CO2 SERPL-SCNC: 29 MMOL/L (ref 21–31)
CREAT SERPL-MCNC: 0.63 MG/DL (ref 0.5–1)
EGFR: 108 ML/MIN
EOSINOPHIL NFR BLD: 2.7 % (ref 0–4)
EOSINOPHILS ABSOLUTE COUNT: 0.2 THOUSAND/UL (ref 0–0.7)
ERYTHROCYTE [DISTWIDTH] IN BLOOD BY AUTOMATED COUNT: 14.6 % (ref 12–15.3)
GFR: 93 ML/MIN
GLUCOSE SERPL-MCNC: 104 MG/DL (ref 70–100)
HBA1C MFR BLD: 7.2 % (ref 4.5–5.7)
HCT VFR BLD AUTO: 38.3 % (ref 37–47)
HDLC SERPL-MCNC: 47 MG/DL (ref 40–75)
HGB BLD-MCNC: 12.7 GM/DL (ref 12–16)
LDLC SERPL CALC-MCNC: 122 MG/DL (ref 0–125)
LYMPHOCYTES %: 40.6 % (ref 15–45)
LYMPHOCYTES ABSOLUTE COUNT: 2.7 THOUSAND/UL (ref 1–4.2)
MCH RBC QN AUTO: 29.3 PG (ref 27–33)
MCHC RBC AUTO-ENTMCNC: 33.1 G/DL (ref 32–36)
MCV RBC AUTO: 88.5 FL (ref 81–99)
MONOCYTES %: 7 % (ref 3–13)
MONOCYTES ABSOLUTE COUNT: 0.5 THOUSAND/UL (ref 0.1–0.8)
NEUTROPHILS ABSOLUTE COUNT: 3.2 THOUSAND/UL (ref 2.1–7.6)
NEUTROPHILS RELATIVE PERCENT: 48.9 % (ref 32–80)
PLATELET # BLD AUTO: 271 THOUSAND/UL (ref 150–350)
PMV BLD AUTO: 8 FL (ref 7–10.2)
POTASSIUM SERPL-SCNC: 4.3 MMOL/L (ref 3.5–5)
RANDOM URINE: 130.7 MG/DL
RANDOM URINE: <1.2 MG/DL
RANDOM URINE: NORMAL
RBC # BLD AUTO: 4.34 MILLION/UL (ref 4.2–5.4)
SODIUM BLD-SCNC: 141 MMOL/L (ref 135–145)
TOTAL PROTEIN: 6.8 G/DL (ref 6.3–8.2)
TRIGL SERPL-MCNC: 52 MG/DL (ref 30–150)
WBC # BLD AUTO: 6.6 THOUSAND/UL (ref 4.5–11)

## 2022-09-14 ENCOUNTER — PATIENT MESSAGE (OUTPATIENT)
Dept: INTERNAL MEDICINE | Facility: CLINIC | Age: 67
End: 2022-09-14
Payer: MEDICARE

## 2022-09-22 ENCOUNTER — PATIENT MESSAGE (OUTPATIENT)
Dept: INTERNAL MEDICINE | Facility: CLINIC | Age: 67
End: 2022-09-22
Payer: MEDICARE

## 2022-09-27 ENCOUNTER — PATIENT OUTREACH (OUTPATIENT)
Dept: ADMINISTRATIVE | Facility: HOSPITAL | Age: 67
End: 2022-09-27
Payer: MEDICARE

## 2022-10-05 ENCOUNTER — PATIENT MESSAGE (OUTPATIENT)
Dept: INTERNAL MEDICINE | Facility: CLINIC | Age: 67
End: 2022-10-05
Payer: MEDICARE

## 2022-11-04 ENCOUNTER — TELEPHONE (OUTPATIENT)
Dept: INTERNAL MEDICINE | Facility: CLINIC | Age: 67
End: 2022-11-04
Payer: MEDICARE

## 2022-11-04 NOTE — TELEPHONE ENCOUNTER
----- Message from Marley Aponte sent at 11/4/2022 12:50 PM CDT -----  Regarding: call back  Contact: [phone]  Who Called: PT     Type:  Needs Medical Advice    Who Called: PT   Symptoms (please be specific): congestion cough and runny nose   How long has patient had these symptoms: 3 days   Pharmacy name and phone #:  [id number] - [place] - [address] AT [facility] [address] Phone: [phone]  Fax: [phone]  Would the patient rather a call back or a response via [facility]? Call back  Best Call Back Number:  [phone]  Additional Information:

## 2023-01-05 ENCOUNTER — PES CALL (OUTPATIENT)
Dept: ADMINISTRATIVE | Facility: CLINIC | Age: 68
End: 2023-01-05
Payer: MEDICARE

## 2023-03-02 ENCOUNTER — OFFICE VISIT (OUTPATIENT)
Dept: PRIMARY CARE CLINIC | Facility: CLINIC | Age: 68
End: 2023-03-02
Payer: MEDICARE

## 2023-03-02 VITALS
WEIGHT: 201.5 LBS | OXYGEN SATURATION: 98 % | HEART RATE: 77 BPM | SYSTOLIC BLOOD PRESSURE: 136 MMHG | DIASTOLIC BLOOD PRESSURE: 84 MMHG | BODY MASS INDEX: 33.57 KG/M2 | HEIGHT: 65 IN

## 2023-03-02 DIAGNOSIS — D84.821 DRUG-INDUCED IMMUNODEFICIENCY: ICD-10-CM

## 2023-03-02 DIAGNOSIS — Z23 PNEUMOCOCCAL VACCINE ADMINISTERED: ICD-10-CM

## 2023-03-02 DIAGNOSIS — I15.2 HYPERTENSION ASSOCIATED WITH DIABETES: ICD-10-CM

## 2023-03-02 DIAGNOSIS — J42 CHRONIC BRONCHITIS, UNSPECIFIED CHRONIC BRONCHITIS TYPE: ICD-10-CM

## 2023-03-02 DIAGNOSIS — J84.89 INTERSTITIAL LUNG DISEASE DUE TO CONNECTIVE TISSUE DISEASE: ICD-10-CM

## 2023-03-02 DIAGNOSIS — I27.0 PRIMARY PULMONARY HYPERTENSION: Primary | ICD-10-CM

## 2023-03-02 DIAGNOSIS — M35.9 INTERSTITIAL LUNG DISEASE DUE TO CONNECTIVE TISSUE DISEASE: ICD-10-CM

## 2023-03-02 DIAGNOSIS — F41.9 ANXIETY: ICD-10-CM

## 2023-03-02 DIAGNOSIS — Z79.4 TYPE 2 DIABETES MELLITUS WITH HYPERGLYCEMIA, WITH LONG-TERM CURRENT USE OF INSULIN: ICD-10-CM

## 2023-03-02 DIAGNOSIS — E78.5 HYPERLIPIDEMIA DUE TO TYPE 2 DIABETES MELLITUS: ICD-10-CM

## 2023-03-02 DIAGNOSIS — E11.59 HYPERTENSION ASSOCIATED WITH DIABETES: ICD-10-CM

## 2023-03-02 DIAGNOSIS — E11.65 TYPE 2 DIABETES MELLITUS WITH HYPERGLYCEMIA, WITH LONG-TERM CURRENT USE OF INSULIN: ICD-10-CM

## 2023-03-02 DIAGNOSIS — I70.0 ATHEROSCLEROSIS OF AORTA: ICD-10-CM

## 2023-03-02 DIAGNOSIS — M06.9 RHEUMATOID ARTHRITIS, INVOLVING UNSPECIFIED SITE, UNSPECIFIED WHETHER RHEUMATOID FACTOR PRESENT: ICD-10-CM

## 2023-03-02 DIAGNOSIS — Z79.899 DRUG-INDUCED IMMUNODEFICIENCY: ICD-10-CM

## 2023-03-02 DIAGNOSIS — E11.69 HYPERLIPIDEMIA DUE TO TYPE 2 DIABETES MELLITUS: ICD-10-CM

## 2023-03-02 DIAGNOSIS — Z23 NEED FOR INFLUENZA VACCINATION: ICD-10-CM

## 2023-03-02 DIAGNOSIS — E66.01 SEVERE OBESITY (BMI 35.0-35.9 WITH COMORBIDITY): ICD-10-CM

## 2023-03-02 PROBLEM — J84.10 PULMONARY FIBROSIS: Status: RESOLVED | Noted: 2020-12-18 | Resolved: 2023-03-02

## 2023-03-02 PROCEDURE — 90732 PPSV23 VACC 2 YRS+ SUBQ/IM: CPT | Mod: S$GLB,,, | Performed by: INTERNAL MEDICINE

## 2023-03-02 PROCEDURE — G0009 PNEUMOCOCCAL POLYSACCHARIDE VACCINE 23-VALENT =>2YO SQ IM: ICD-10-PCS | Mod: S$GLB,,, | Performed by: INTERNAL MEDICINE

## 2023-03-02 PROCEDURE — 1125F PR PAIN SEVERITY QUANTIFIED, PAIN PRESENT: ICD-10-PCS | Mod: CPTII,S$GLB,, | Performed by: INTERNAL MEDICINE

## 2023-03-02 PROCEDURE — 99999 PR PBB SHADOW E&M-EST. PATIENT-LVL IV: ICD-10-PCS | Mod: PBBFAC,,, | Performed by: INTERNAL MEDICINE

## 2023-03-02 PROCEDURE — 99999 PR PBB SHADOW E&M-EST. PATIENT-LVL IV: CPT | Mod: PBBFAC,,, | Performed by: INTERNAL MEDICINE

## 2023-03-02 PROCEDURE — 90694 FLU VACCINE - QUADRIVALENT - ADJUVANTED: ICD-10-PCS | Mod: S$GLB,,, | Performed by: INTERNAL MEDICINE

## 2023-03-02 PROCEDURE — 90694 VACC AIIV4 NO PRSRV 0.5ML IM: CPT | Mod: S$GLB,,, | Performed by: INTERNAL MEDICINE

## 2023-03-02 PROCEDURE — 3008F PR BODY MASS INDEX (BMI) DOCUMENTED: ICD-10-PCS | Mod: CPTII,S$GLB,, | Performed by: INTERNAL MEDICINE

## 2023-03-02 PROCEDURE — 3075F SYST BP GE 130 - 139MM HG: CPT | Mod: CPTII,S$GLB,, | Performed by: INTERNAL MEDICINE

## 2023-03-02 PROCEDURE — 1159F MED LIST DOCD IN RCRD: CPT | Mod: CPTII,S$GLB,, | Performed by: INTERNAL MEDICINE

## 2023-03-02 PROCEDURE — 1159F PR MEDICATION LIST DOCUMENTED IN MEDICAL RECORD: ICD-10-PCS | Mod: CPTII,S$GLB,, | Performed by: INTERNAL MEDICINE

## 2023-03-02 PROCEDURE — 3079F PR MOST RECENT DIASTOLIC BLOOD PRESSURE 80-89 MM HG: ICD-10-PCS | Mod: CPTII,S$GLB,, | Performed by: INTERNAL MEDICINE

## 2023-03-02 PROCEDURE — 3075F PR MOST RECENT SYSTOLIC BLOOD PRESS GE 130-139MM HG: ICD-10-PCS | Mod: CPTII,S$GLB,, | Performed by: INTERNAL MEDICINE

## 2023-03-02 PROCEDURE — 3008F BODY MASS INDEX DOCD: CPT | Mod: CPTII,S$GLB,, | Performed by: INTERNAL MEDICINE

## 2023-03-02 PROCEDURE — 1125F AMNT PAIN NOTED PAIN PRSNT: CPT | Mod: CPTII,S$GLB,, | Performed by: INTERNAL MEDICINE

## 2023-03-02 PROCEDURE — 90732 PNEUMOCOCCAL POLYSACCHARIDE VACCINE 23-VALENT =>2YO SQ IM: ICD-10-PCS | Mod: S$GLB,,, | Performed by: INTERNAL MEDICINE

## 2023-03-02 PROCEDURE — 99214 PR OFFICE/OUTPT VISIT, EST, LEVL IV, 30-39 MIN: ICD-10-PCS | Mod: S$GLB,,, | Performed by: INTERNAL MEDICINE

## 2023-03-02 PROCEDURE — G0008 FLU VACCINE - QUADRIVALENT - ADJUVANTED: ICD-10-PCS | Mod: S$GLB,,, | Performed by: INTERNAL MEDICINE

## 2023-03-02 PROCEDURE — 3079F DIAST BP 80-89 MM HG: CPT | Mod: CPTII,S$GLB,, | Performed by: INTERNAL MEDICINE

## 2023-03-02 PROCEDURE — 99214 OFFICE O/P EST MOD 30 MIN: CPT | Mod: S$GLB,,, | Performed by: INTERNAL MEDICINE

## 2023-03-02 PROCEDURE — G0008 ADMIN INFLUENZA VIRUS VAC: HCPCS | Mod: S$GLB,,, | Performed by: INTERNAL MEDICINE

## 2023-03-02 PROCEDURE — G0009 ADMIN PNEUMOCOCCAL VACCINE: HCPCS | Mod: S$GLB,,, | Performed by: INTERNAL MEDICINE

## 2023-03-02 RX ORDER — ALPRAZOLAM 0.25 MG/1
0.25 TABLET ORAL 3 TIMES DAILY
Qty: 90 TABLET | Refills: 3 | Status: SHIPPED | OUTPATIENT
Start: 2023-03-02 | End: 2023-11-22

## 2023-03-02 NOTE — PROGRESS NOTES
[facility] Note    Chief Complaint      Chief Complaint   Patient presents with    Follow-up       History of Present Illness      Margarita Orourke is a 67 y.o. female who presents today for follow up of DM2.  Patient comes to appointment alone.  Pulm: Dr. Orellana, cardiology: Dr. Valdez, Rheum: Dr. Yin, optho: Vilma    Problem List Items Addressed This Visit       Hypertension associated with diabetes    Current Assessment & Plan     BP controlled today on HCTZ 25 mg daily. No CP/SOB. BP at home has been looking good. Takes Clonidine PRN.  Previously on Norvasc, Losartan (stopped taking since last visit), Irbesartan and Bystolic.  BP at home has been 120/70's.  Sees Dr. Valdez.         Hyperlipidemia due to type 2 diabetes mellitus    Current Assessment & Plan     Stable on crestor 20 mg daily, no myalgias.  The 10-year ASCVD risk score (Yodit ALMAZAN, et al., 2019) is: 15.9%    Values used to calculate the score:      Age: 67 years      Sex: Female      Is Non- : Yes      Diabetic: Yes      Tobacco smoker: No      Systolic Blood Pressure: 112 mmHg      Is BP treated: Yes      HDL Cholesterol: 47 mg/dL      Total Cholesterol: 177 mg/dL           Type 2 diabetes mellitus with hyperglycemia, with long-term current use of insulin    Current Assessment & Plan     A1C 7.2 in 9/2022, stable on lantus 48 units at night.  Lowest glucose has been 100.  AM glucose today 126.         Relevant Orders    CBC Auto Differential    Comprehensive Metabolic Panel    Lipid Panel    Microalbumin/Creatinine Ratio, Urine    Hemoglobin A1C    Anxiety    Current Assessment & Plan     Taking 1/2 tablet of xanax PRN, anxiety and depression have been better.  Has been going to pul support group.  Effexor made her hallucinate and stopped.  Also stopped buspar because she felt like it made her depressed.  Has had anxiety since she was 17.  Has tried paxil, prozac, lexapro and  zoloft in past; had issues with all of them.         Relevant Medications    ALPRAZolam (XANAX) 0.25 MG tablet    COPD (chronic obstructive pulmonary disease)    Current Assessment & Plan     Stable on Spiriva, symbicort with albuterol PRN. No cough, no fever, no SOB.  Seeing Pulm at , Dr. Orellana.         Rheumatoid arthritis    Current Assessment & Plan     Sees rheum Dr. Chandler at .  On Plaquenil and methotrexate.  Previously on Cellcept.  UTD on eye exam.  Worst in shoulder and hands, pain comes and goes.         Atherosclerosis of aorta    Overview     Media tab 7/1/20 [facility] CT Chest:   Some tortuosity and atherosclerotic calcification of the aorta.            Current Assessment & Plan     On ASA and statin.         Severe obesity (BMI 35.0-35.9 with comorbidity)    Current Assessment & Plan     Going to Pulmonary rehab, still not doing well on diet.         Interstitial lung disease due to connective tissue disease    Current Assessment & Plan     Sees Dr. Orellana. Doing Pulmonary Rehab 3 times per week.  2/2 her RA. Does Symbicort and Albuterol PRN.  Breathing has been stable.         Drug-induced immunodeficiency    Primary pulmonary hypertension - Primary    Current Assessment & Plan     Stable, sees Dr. Orellana.          Other Visit Diagnoses       Need for influenza vaccination        Relevant Orders    Influenza (FLUAD) - Quadrivalent (Adjuvanted) *Preferred* (65+) (PF)    Pneumococcal vaccine administered        Relevant Orders    Pneumococcal Polysaccharide Vaccine (23 Valent) (SQ/IM)              Health Maintenance   Topic Date Due    TETANUS VACCINE  Never done    Eye Exam  03/18/2022    Hemoglobin A1c  03/13/2023    High Dose Statin  09/01/2023    Lipid Panel  09/13/2023    Mammogram  10/05/2023    Foot Exam  03/02/2024    DEXA Scan  09/13/2025    Hepatitis C Screening  Completed       Past Medical History:   Diagnosis Date    Anxiety     Arthritis     Asthma     Back  pain     Depression     Diabetes mellitus     Eczema     GERD (gastroesophageal reflux disease)     Hepatitis B     Hyperlipidemia     Hypertension     Seizures        History reviewed. No pertinent surgical history.    family history is not on file.    Social History     Tobacco Use    Smoking status: Former     Types: Cigarettes     Quit date: 6/2/2019     Years since quitting: 3.7    Smokeless tobacco: Never   Substance Use Topics    Alcohol use: No    Drug use: No       Review of Systems   Constitutional:  Negative for chills and fever.   HENT:  Negative for sore throat.    Respiratory:  Negative for cough and shortness of breath.    Cardiovascular:  Negative for chest pain and palpitations.   Gastrointestinal:  Negative for constipation, diarrhea, nausea and vomiting.   Genitourinary:  Negative for dysuria and hematuria.   Musculoskeletal:  Negative for falls.   Neurological:  Negative for headaches.      Outpatient Encounter Medications as of 3/2/2023   Medication Sig Dispense Refill    albuterol (PROVENTIL/VENTOLIN HFA) 90 mcg/actuation inhaler INHALE 2 PUFFS INTO THE LUNGS EVERY 6 HOURS AS NEEDED FOR WHEEZING 54 g 3    alcohol swabs (ALCOHOL PREP PADS) PadM Apply 1 each topically 3 (three) times daily. 300 each 3    blood sugar diagnostic (TRUE METRIX GLUCOSE TEST STRIP) Strp 300 strips by Misc.(Non-Drug; Combo Route) route 3 (three) times daily. 300 strip 3    budesonide-formoterol 160-4.5 mcg (SYMBICORT) 160-4.5 mcg/actuation HFAA Inhale 2 puffs into the lungs every 12 (twelve) hours. Controller      cloNIDine (CATAPRES) 0.1 MG tablet Take 1 tablet (0.1 mg total) by mouth daily as needed (BP over 160/100). 30 tablet 1    clotrimazole-betamethasone 1-0.05% (LOTRISONE) cream Apply topically 2 (two) times daily. 45 g 3    ergocalciferol (ERGOCALCIFEROL) 50,000 unit Cap Take 1 capsule (50,000 Units total) by mouth every 7 days. 12 capsule 3    hydroCHLOROthiazide (HYDRODIURIL) 25 MG tablet  "Take 1 tablet (25 mg total) by mouth once daily. 90 tablet 3    hydrOXYchloroQUINE (PLAQUENIL) 200 mg tablet Take 200 mg by mouth 2 (two) times a day.      insulin (LANTUS SOLOSTAR U-100 INSULIN) glargine 100 units/mL SubQ pen Inject 54 Units into the skin once daily. (Patient taking differently: Inject 48 Units into the skin once daily.) 54 mL 3    lancets 30 gauge Misc 300 lancets by Misc.(Non-Drug; Combo Route) route 3 (three) times daily. 300 each 3    latanoprost 0.005 % ophthalmic solution       METHOTREXATE ORAL Take 6 tablets by mouth once a week.      omeprazole (PRILOSEC) 20 MG capsule Take 1 capsule (20 mg total) by mouth once daily. 90 capsule 3    ondansetron (ZOFRAN-ODT) 4 MG TbDL Take 1 tablet (4 mg total) by mouth every 12 (twelve) hours. 30 tablet 3    pen needle, diabetic 32 gauge x 5/32" Ndle 1 Box by Misc.(Non-Drug; Combo Route) route once daily. Use daily with lantus solostar 90 each 3    rosuvastatin (CRESTOR) 20 MG tablet Take 1 tablet (20 mg total) by mouth once daily. 90 tablet 3    timolol maleate 0.5% (TIMOPTIC-XE) 0.5 % SolG Place 1 drop into both eyes nightly.      tiotropium (SPIRIVA WITH HANDIHALER) 18 mcg inhalation capsule   INL THE CONTENTS OF 1 C INTO THE LUNGS ONCE D. CONTROLLER      [DISCONTINUED] ALPRAZolam (XANAX) 0.25 MG tablet Take 1 tablet (0.25 mg total) by mouth 3 (three) times daily. 90 tablet 3    ALPRAZolam (XANAX) 0.25 MG tablet Take 1 tablet (0.25 mg total) by mouth 3 (three) times daily. 90 tablet 3    [DISCONTINUED] losartan (COZAAR) 50 MG tablet Take 1 tablet (50 mg total) by mouth once daily. (Patient not taking: Reported on 3/2/2023) 90 tablet 3    [DISCONTINUED] simvastatin (ZOCOR) 40 MG tablet Take 1 tablet (40 mg total) by mouth every evening. (Patient not taking: Reported on 3/2/2023) 90 tablet 3     No facility-administered encounter medications on file as of 3/2/2023.        Review of patient's allergies indicates:   Allergen Reactions    " "Zantac [ranitidine hcl] Nausea And Vomiting    Amlodipine     Hydralazine     Ondansetron hcl Other (See Comments)    Penicillins     Phenergan [promethazine] Nausea Only       Physical Exam      Vital Signs  Pulse: 77  SpO2: 98 %  BP: 136/84  Pain Score:   5  Pain Loc: Finger  Height and Weight  Height: 5' 5" (165.1 cm)  Weight: 91.4 kg (201 lb 8 oz)  BSA (Calculated - sq m): 2.05 sq meters  BMI (Calculated): 33.5  Weight in (lb) to have BMI = 25: 149.9]    Physical Exam  Constitutional:       Appearance: She is well-developed.   HENT:      Head: Normocephalic and atraumatic.      Right Ear: External ear normal.      Left Ear: External ear normal.   Eyes:      General:         Right eye: No discharge.         Left eye: No discharge.   Cardiovascular:      Rate and Rhythm: Normal rate and regular rhythm.      Heart sounds: Normal heart sounds. No murmur heard.  Pulmonary:      Effort: Pulmonary effort is normal. No respiratory distress.      Breath sounds: Normal breath sounds.   Abdominal:      General: There is no distension.      Palpations: Abdomen is soft.      Tenderness: There is no abdominal tenderness. There is no guarding.   Musculoskeletal:         General: Normal range of motion.      Cervical back: Normal range of motion.   Skin:     General: Skin is warm and dry.   Neurological:      Mental Status: She is alert and oriented to person, place, and time.   Psychiatric:         Behavior: Behavior normal.        Laboratory:  CBC:  No results for input(s): WBC, RBC, HGB, HCT, PLT, MCV, MCH, MCHC in the last 2160 hours.  CMP:  No results for input(s): GLU, CALCIUM, ALBUMIN, PROT, NA, K, CO2, CL, BUN, ALKPHOS, ALT, AST, BILITOT in the last 2160 hours.    Invalid input(s): CREATININ  URINALYSIS:  No results for input(s): COLORU, CLARITYU, SPECGRAV, PHUR, PROTEINUA, GLUCOSEU, BILIRUBINCON, BLOODU, WBCU, RBCU, BACTERIA, MUCUS, NITRITE, LEUKOCYTESUR, UROBILINOGEN, HYALINECASTS in the last 2160 hours. "   LIPIDS:  No results for input(s): TSH, HDL, CHOL, TRIG, LDLCALC, CHOLHDL, NONHDLCHOL, TOTALCHOLEST in the last 2160 hours.  TSH:  No results for input(s): TSH in the last 2160 hours.  A1C:  No results for input(s): HGBA1C in the last 2160 hours.    Radiology:  No results found in the last 30 days.     Assessment/Plan     Margarita Orourke is a 67 y.o.female with:    1. Primary pulmonary hypertension    2. Hypertension associated with diabetes    3. Severe obesity (BMI 35.0-35.9 with comorbidity)    4. Interstitial lung disease due to connective tissue disease    5. Drug-induced immunodeficiency    6. Atherosclerosis of aorta    7. Chronic bronchitis, unspecified chronic bronchitis type    8. Anxiety  - ALPRAZolam (XANAX) 0.25 MG tablet; Take 1 tablet (0.25 mg total) by mouth 3 (three) times daily.  Dispense: 90 tablet; Refill: 3    9. Hyperlipidemia due to type 2 diabetes mellitus    10. Rheumatoid arthritis, involving unspecified site, unspecified whether rheumatoid factor present    11. Type 2 diabetes mellitus with hyperglycemia, with long-term current use of insulin  - CBC Auto Differential; Future  - Comprehensive Metabolic Panel; Future  - Lipid Panel; Future  - Microalbumin/Creatinine Ratio, Urine; Future  - Hemoglobin A1C; Future  - CBC Auto Differential  - Comprehensive Metabolic Panel  - Lipid Panel  - Microalbumin/Creatinine Ratio, Urine  - Hemoglobin A1C    12. Need for influenza vaccination  - Influenza (FLUAD) - Quadrivalent (Adjuvanted) *Preferred* (65+) (PF)    13. Pneumococcal vaccine administered  - Pneumococcal Polysaccharide Vaccine (23 Valent) (SQ/IM)        -labs ordered  -pneumovax and flu shot today, will get COVID booster at pharmacy  -will call to schedule eye exam with Dr. Esparza next month  -Continue current medications and maintain follow up with specialists.    -Follow up in about 6 months (around 9/2/2023) for follow up of medical problems.       Verena Amaral MD  [facility]

## 2023-03-02 NOTE — ASSESSMENT & PLAN NOTE
Sees rheum Dr. Chandler at .  On Plaquenil and methotrexate.  Previously on Cellcept.  UTD on eye exam.  Worst in shoulder and hands, pain comes and goes.

## 2023-03-02 NOTE — ASSESSMENT & PLAN NOTE
BP controlled today on HCTZ 25 mg daily. No CP/SOB. BP at home has been looking good. Takes Clonidine PRN.  Previously on Norvasc, Losartan (stopped taking since last visit), Irbesartan and Bystolic.  BP at home has been 120/70's.  Sees Dr. Valdez.

## 2023-03-02 NOTE — ASSESSMENT & PLAN NOTE
Stable on crestor 20 mg daily, no myalgias.  The 10-year ASCVD risk score (Yodit ALMAZAN, et al., 2019) is: 15.9%    Values used to calculate the score:      Age: 67 years      Sex: Female      Is Non- : Yes      Diabetic: Yes      Tobacco smoker: No      Systolic Blood Pressure: 112 mmHg      Is BP treated: Yes      HDL Cholesterol: 47 mg/dL      Total Cholesterol: 177 mg/dL

## 2023-03-02 NOTE — ASSESSMENT & PLAN NOTE
A1C 7.2 in 9/2022, stable on lantus 48 units at night.  Lowest glucose has been 100.  AM glucose today 126.

## 2023-03-02 NOTE — ASSESSMENT & PLAN NOTE
Taking 1/2 tablet of xanax PRN, anxiety and depression have been better.  Has been going to [facility] support group.  Effexor made her hallucinate and stopped.  Also stopped buspar because she felt like it made her depressed.  Has had anxiety since she was 17.  Has tried paxil, prozac, lexapro and zoloft in past; had issues with all of them.

## 2023-04-03 ENCOUNTER — PATIENT MESSAGE (OUTPATIENT)
Dept: ADMINISTRATIVE | Facility: HOSPITAL | Age: 68
End: 2023-04-03
Payer: MEDICARE

## 2023-06-02 ENCOUNTER — PATIENT MESSAGE (OUTPATIENT)
Dept: PRIMARY CARE CLINIC | Facility: CLINIC | Age: 68
End: 2023-06-02
Payer: MEDICARE

## 2023-06-15 ENCOUNTER — PATIENT OUTREACH (OUTPATIENT)
Dept: ADMINISTRATIVE | Facility: HOSPITAL | Age: 68
End: 2023-06-15
Payer: MEDICARE

## 2023-06-15 ENCOUNTER — PATIENT MESSAGE (OUTPATIENT)
Dept: ADMINISTRATIVE | Facility: HOSPITAL | Age: 68
End: 2023-06-15
Payer: MEDICARE

## 2023-06-15 NOTE — PROGRESS NOTES
Health Maintenance Due   Topic Date Due    TETANUS VACCINE  Never done    Shingles Vaccine (1 of 2) Never done    High Dose Statin  Never done    Eye Exam  03/18/2022    COVID-19 Vaccine (5 - Pfizer series) 08/30/2022    Hemoglobin A1c  03/13/2023     Chart review done.  updated. Immunizations reviewed & updated. Care Everywhere updated.  Chart reviewed for May 2023 Statin Gap Report.  Portal Message sent re:  statin compliance/ need for refill.

## 2023-07-05 ENCOUNTER — PATIENT OUTREACH (OUTPATIENT)
Dept: ADMINISTRATIVE | Facility: HOSPITAL | Age: 68
End: 2023-07-05
Payer: MEDICARE

## 2023-07-05 NOTE — PROGRESS NOTES
Health Maintenance Due   Topic Date Due    TETANUS VACCINE  Never done    Shingles Vaccine (1 of 2) Never done    High Dose Statin  Never done    Eye Exam  03/18/2022    COVID-19 Vaccine (5 - Pfizer series) 08/30/2022    Hemoglobin A1c  03/13/2023     Chart review done.  updated. Immunizations reviewed & updated. Care Everywhere updated.

## 2023-07-24 ENCOUNTER — PATIENT MESSAGE (OUTPATIENT)
Dept: ADMINISTRATIVE | Facility: HOSPITAL | Age: 68
End: 2023-07-24
Payer: MEDICARE

## 2023-07-24 ENCOUNTER — PATIENT OUTREACH (OUTPATIENT)
Dept: ADMINISTRATIVE | Facility: HOSPITAL | Age: 68
End: 2023-07-24
Payer: MEDICARE

## 2023-07-24 NOTE — PROGRESS NOTES
Health Maintenance Due   Topic Date Due    TETANUS VACCINE  Never done    Shingles Vaccine (1 of 2) Never done    High Dose Statin  Never done    Eye Exam  03/18/2022    COVID-19 Vaccine (5 - Pfizer series) 08/30/2022    Hemoglobin A1c  03/13/2023    Diabetes Urine Screening  09/13/2023     Chart review done.  updated. Immunizations reviewed & updated. Care Everywhere updated.

## 2023-08-08 RX ORDER — LANCETS 33 GAUGE
EACH MISCELLANEOUS
Qty: 300 EACH | Refills: 0 | OUTPATIENT
Start: 2023-08-08

## 2023-08-08 RX ORDER — ISOPROPYL ALCOHOL 70 ML/100ML
SWAB TOPICAL
Refills: 0 | OUTPATIENT
Start: 2023-08-08

## 2023-08-08 RX ORDER — DEXTROSE 4 G
TABLET,CHEWABLE ORAL
Refills: 0 | OUTPATIENT
Start: 2023-08-08

## 2023-08-08 NOTE — TELEPHONE ENCOUNTER
Refill Decision Note   Margarita Orourke  is requesting a refill authorization.  Brief Assessment and Rationale for Refill:  Quick Discontinue     Medication Therapy Plan:    Pharmacy is requesting new scripts for the following medications without required information, (sig/ frequency/qty/etc)      Medication Reconciliation Completed: No     Comments: Pharmacies have been requesting medications for patients without required information, (sig, frequency, qty, etc.). In addition, requests are sent for medication(s) pt. are currently not taking, and medications patients have never taken.    We have spoken to the pharmacies about these request types and advised their teams previously that we are unable to assess these New Script requests and require all details for these requests. This is a known issue and has been reported.     Note composed:1:55 PM 08/08/2023

## 2023-09-01 ENCOUNTER — CLINICAL SUPPORT (OUTPATIENT)
Dept: PRIMARY CARE CLINIC | Facility: CLINIC | Age: 68
End: 2023-09-01
Attending: INTERNAL MEDICINE
Payer: MEDICARE

## 2023-09-01 ENCOUNTER — OFFICE VISIT (OUTPATIENT)
Dept: PRIMARY CARE CLINIC | Facility: CLINIC | Age: 68
End: 2023-09-01
Payer: MEDICARE

## 2023-09-01 VITALS
WEIGHT: 202.63 LBS | BODY MASS INDEX: 33.71 KG/M2 | SYSTOLIC BLOOD PRESSURE: 130 MMHG | HEART RATE: 68 BPM | DIASTOLIC BLOOD PRESSURE: 70 MMHG | OXYGEN SATURATION: 97 %

## 2023-09-01 DIAGNOSIS — Z79.4 TYPE 2 DIABETES MELLITUS WITH HYPERGLYCEMIA, WITH LONG-TERM CURRENT USE OF INSULIN: ICD-10-CM

## 2023-09-01 DIAGNOSIS — I27.0 PRIMARY PULMONARY HYPERTENSION: ICD-10-CM

## 2023-09-01 DIAGNOSIS — H40.023 OPEN ANGLE WITH BORDERLINE FINDINGS AND HIGH GLAUCOMA RISK IN BOTH EYES: Primary | ICD-10-CM

## 2023-09-01 DIAGNOSIS — Z79.4 TYPE 2 DIABETES MELLITUS WITH HYPERGLYCEMIA, WITH LONG-TERM CURRENT USE OF INSULIN: Primary | ICD-10-CM

## 2023-09-01 DIAGNOSIS — Z79.899 DRUG-INDUCED IMMUNODEFICIENCY: ICD-10-CM

## 2023-09-01 DIAGNOSIS — E11.65 TYPE 2 DIABETES MELLITUS WITH HYPERGLYCEMIA, WITH LONG-TERM CURRENT USE OF INSULIN: ICD-10-CM

## 2023-09-01 DIAGNOSIS — M25.561 CHRONIC PAIN OF BOTH KNEES: ICD-10-CM

## 2023-09-01 DIAGNOSIS — G89.29 CHRONIC PAIN OF BOTH KNEES: ICD-10-CM

## 2023-09-01 DIAGNOSIS — J84.89 INTERSTITIAL LUNG DISEASE DUE TO CONNECTIVE TISSUE DISEASE: ICD-10-CM

## 2023-09-01 DIAGNOSIS — M06.9 RHEUMATOID ARTHRITIS, INVOLVING UNSPECIFIED SITE, UNSPECIFIED WHETHER RHEUMATOID FACTOR PRESENT: ICD-10-CM

## 2023-09-01 DIAGNOSIS — E11.59 HYPERTENSION ASSOCIATED WITH DIABETES: ICD-10-CM

## 2023-09-01 DIAGNOSIS — M25.562 CHRONIC PAIN OF BOTH KNEES: ICD-10-CM

## 2023-09-01 DIAGNOSIS — M35.9 INTERSTITIAL LUNG DISEASE DUE TO CONNECTIVE TISSUE DISEASE: ICD-10-CM

## 2023-09-01 DIAGNOSIS — E11.65 TYPE 2 DIABETES MELLITUS WITH HYPERGLYCEMIA, WITH LONG-TERM CURRENT USE OF INSULIN: Primary | ICD-10-CM

## 2023-09-01 DIAGNOSIS — E11.69 HYPERLIPIDEMIA DUE TO TYPE 2 DIABETES MELLITUS: ICD-10-CM

## 2023-09-01 DIAGNOSIS — Z12.31 ENCOUNTER FOR SCREENING MAMMOGRAM FOR MALIGNANT NEOPLASM OF BREAST: ICD-10-CM

## 2023-09-01 DIAGNOSIS — I70.0 ATHEROSCLEROSIS OF AORTA: ICD-10-CM

## 2023-09-01 DIAGNOSIS — I15.2 HYPERTENSION ASSOCIATED WITH DIABETES: ICD-10-CM

## 2023-09-01 DIAGNOSIS — E78.5 HYPERLIPIDEMIA DUE TO TYPE 2 DIABETES MELLITUS: ICD-10-CM

## 2023-09-01 DIAGNOSIS — J42 CHRONIC BRONCHITIS, UNSPECIFIED CHRONIC BRONCHITIS TYPE: ICD-10-CM

## 2023-09-01 DIAGNOSIS — D84.821 DRUG-INDUCED IMMUNODEFICIENCY: ICD-10-CM

## 2023-09-01 PROCEDURE — 3075F SYST BP GE 130 - 139MM HG: CPT | Mod: CPTII,S$GLB,, | Performed by: INTERNAL MEDICINE

## 2023-09-01 PROCEDURE — 1159F MED LIST DOCD IN RCRD: CPT | Mod: CPTII,S$GLB,, | Performed by: INTERNAL MEDICINE

## 2023-09-01 PROCEDURE — 2025F 7 FLD RTA PHOTO W/O RTNOPTHY: CPT | Mod: CPTII,S$GLB,, | Performed by: OPHTHALMOLOGY

## 2023-09-01 PROCEDURE — 99214 OFFICE O/P EST MOD 30 MIN: CPT | Mod: S$GLB,,, | Performed by: INTERNAL MEDICINE

## 2023-09-01 PROCEDURE — 99999 PR PBB SHADOW E&M-EST. PATIENT-LVL V: CPT | Mod: PBBFAC,,, | Performed by: INTERNAL MEDICINE

## 2023-09-01 PROCEDURE — 92228 IMG RTA DETC/MNTR DS PHY/QHP: CPT | Mod: TC,S$GLB,, | Performed by: INTERNAL MEDICINE

## 2023-09-01 PROCEDURE — 92228 IMG RTA DETC/MNTR DS PHY/QHP: CPT | Mod: 26,S$GLB,, | Performed by: OPHTHALMOLOGY

## 2023-09-01 PROCEDURE — 3008F BODY MASS INDEX DOCD: CPT | Mod: CPTII,S$GLB,, | Performed by: INTERNAL MEDICINE

## 2023-09-01 PROCEDURE — 1125F AMNT PAIN NOTED PAIN PRSNT: CPT | Mod: CPTII,S$GLB,, | Performed by: INTERNAL MEDICINE

## 2023-09-01 PROCEDURE — 1125F PR PAIN SEVERITY QUANTIFIED, PAIN PRESENT: ICD-10-PCS | Mod: CPTII,S$GLB,, | Performed by: INTERNAL MEDICINE

## 2023-09-01 PROCEDURE — 3075F PR MOST RECENT SYSTOLIC BLOOD PRESS GE 130-139MM HG: ICD-10-PCS | Mod: CPTII,S$GLB,, | Performed by: INTERNAL MEDICINE

## 2023-09-01 PROCEDURE — 99999 PR PBB SHADOW E&M-EST. PATIENT-LVL V: ICD-10-PCS | Mod: PBBFAC,,, | Performed by: INTERNAL MEDICINE

## 2023-09-01 PROCEDURE — 92228 DIABETIC EYE SCREENING PHOTO: ICD-10-PCS | Mod: 26,S$GLB,, | Performed by: OPHTHALMOLOGY

## 2023-09-01 PROCEDURE — 3078F PR MOST RECENT DIASTOLIC BLOOD PRESSURE < 80 MM HG: ICD-10-PCS | Mod: CPTII,S$GLB,, | Performed by: INTERNAL MEDICINE

## 2023-09-01 PROCEDURE — 99214 PR OFFICE/OUTPT VISIT, EST, LEVL IV, 30-39 MIN: ICD-10-PCS | Mod: S$GLB,,, | Performed by: INTERNAL MEDICINE

## 2023-09-01 PROCEDURE — 1159F PR MEDICATION LIST DOCUMENTED IN MEDICAL RECORD: ICD-10-PCS | Mod: CPTII,S$GLB,, | Performed by: INTERNAL MEDICINE

## 2023-09-01 PROCEDURE — 2025F DIABETIC EYE SCREENING PHOTO: ICD-10-PCS | Mod: CPTII,S$GLB,, | Performed by: OPHTHALMOLOGY

## 2023-09-01 PROCEDURE — 3078F DIAST BP <80 MM HG: CPT | Mod: CPTII,S$GLB,, | Performed by: INTERNAL MEDICINE

## 2023-09-01 PROCEDURE — 3008F PR BODY MASS INDEX (BMI) DOCUMENTED: ICD-10-PCS | Mod: CPTII,S$GLB,, | Performed by: INTERNAL MEDICINE

## 2023-09-01 PROCEDURE — 92228 PR REMOTE IMAGE RETINA, MONITOR/MANAGE ACTIVE DISEASE: ICD-10-PCS | Mod: TC,S$GLB,, | Performed by: INTERNAL MEDICINE

## 2023-09-01 RX ORDER — OMEPRAZOLE 20 MG/1
20 CAPSULE, DELAYED RELEASE ORAL DAILY
Qty: 90 CAPSULE | Refills: 3 | Status: SHIPPED | OUTPATIENT
Start: 2023-09-01

## 2023-09-01 RX ORDER — ROSUVASTATIN CALCIUM 5 MG/1
5 TABLET, COATED ORAL DAILY
Qty: 90 TABLET | Refills: 3 | Status: SHIPPED | OUTPATIENT
Start: 2023-09-01 | End: 2024-08-31

## 2023-09-01 RX ORDER — FOLIC ACID 1 MG/1
1 TABLET ORAL DAILY
COMMUNITY
Start: 2023-08-14

## 2023-09-01 RX ORDER — METOPROLOL SUCCINATE 50 MG/1
50 TABLET, EXTENDED RELEASE ORAL DAILY
COMMUNITY
Start: 2023-07-13

## 2023-09-01 NOTE — ASSESSMENT & PLAN NOTE
BP controlled today on Toprol XL 50 mg, HCTZ 25 mg daily. No CP/SOB. BP at home has been looking good. Takes Clonidine PRN, has not taken in 1 week.  Previously on Norvasc, Losartan, Irbesartan and Bystolic.

## 2023-09-01 NOTE — ASSESSMENT & PLAN NOTE
Sees Dr. Orellana. Doing Pulmonary Rehab 3 times per week.  2/2 her RA. Does Symbicort and Albuterol PRN.  Breathing has been stable.  CT and PFT's stable in 8/2023.

## 2023-09-01 NOTE — ASSESSMENT & PLAN NOTE
Stopped taking crestor, no myalgias.  The 10-year ASCVD risk score (Yodit ALMAZAN, et al., 2019) is: 22.4%    Values used to calculate the score:      Age: 68 years      Sex: Female      Is Non- : Yes      Diabetic: Yes      Tobacco smoker: No      Systolic Blood Pressure: 130 mmHg      Is BP treated: Yes      HDL Cholesterol: 47 mg/dL      Total Cholesterol: 177 mg/dL

## 2023-09-01 NOTE — PROGRESS NOTES
Margarita Orourke is a 68 y.o. female here for a diabetic eye screening with non-dilated fundus photos per Dr. Amaral.    Patient cooperative?: Yes  Small pupils?: No  Last eye exam: na    For exam results, see Encounter Report.

## 2023-09-01 NOTE — PROGRESS NOTES
[facility] Note    Chief Complaint      Chief Complaint   Patient presents with    Follow-up       History of Present Illness      Margarita Orourke is a 68 y.o. female who presents today for follow up of DM2.  Patient comes to appointment alone.  Pulm: Dr. Orellana, cardiology: Dr. Valdez, Rheum: Dr. Valdes, optho: Vilma    Had LHC with Dr. Valdez on 8/1 which was unremarkable.  C/o bilateral knee pain, not interested in injections or surgery.    Problem List Items Addressed This Visit       Hypertension associated with diabetes    Current Assessment & Plan     BP controlled today on Toprol XL 50 mg, HCTZ 25 mg daily. No CP/SOB. BP at home has been looking good. Takes Clonidine PRN, has not taken in 1 week.  Previously on Norvasc, Losartan, Irbesartan and Bystolic.           Hyperlipidemia due to type 2 diabetes mellitus    Current Assessment & Plan     Stopped taking crestor, no myalgias.  The 10-year ASCVD risk score (Yodit ALMAZAN, et al., 2019) is: 22.4%    Values used to calculate the score:      Age: 68 years      Sex: Female      Is Non- : Yes      Diabetic: Yes      Tobacco smoker: No      Systolic Blood Pressure: 130 mmHg      Is BP treated: Yes      HDL Cholesterol: 47 mg/dL      Total Cholesterol: 177 mg/dL           Relevant Medications    rosuvastatin (CRESTOR) 5 MG tablet    Type 2 diabetes mellitus with hyperglycemia, with long-term current use of insulin - Primary    Relevant Orders    Hemoglobin A1C    Microalbumin/Creatinine Ratio, Urine    Lipid Panel    Comprehensive Metabolic Panel    CBC Auto Differential    Diabetic Eye Screening Photo    COPD (chronic obstructive pulmonary disease)    Current Assessment & Plan     Stable on Spiriva, symbicort with albuterol PRN. No cough, no fever, no SOB.  Seeing Pulm at , Dr. Orellana.         Rheumatoid arthritis    Current Assessment & Plan     Sees rheum Dr. Chandler at .  On Plaquenil and  methotrexate.  Previously on Cellcept.  UTD on eye exam.  Worst in shoulder and hands, pain comes and goes.         Atherosclerosis of aorta    Overview     Media tab 7/1/20 [facility] CT Chest:   Some tortuosity and atherosclerotic calcification of the aorta.            Current Assessment & Plan     On ASA and statin.         Interstitial lung disease due to connective tissue disease    Current Assessment & Plan     Sees Dr. Orellana. Doing Pulmonary Rehab 3 times per week.  2/2 her RA. Does Symbicort and Albuterol PRN.  Breathing has been stable.  CT and PFT's stable in 8/2023.         Drug-induced immunodeficiency    Primary pulmonary hypertension    Current Assessment & Plan     Stable, sees Dr. Orellana.            Other Visit Diagnoses       Encounter for screening mammogram for malignant neoplasm of breast        Relevant Orders    Mammo Digital Screening Bilat w/ Abdoul    Chronic pain of both knees        Relevant Orders    Ambulatory referral/consult to Physical/Occupational Therapy                Health Maintenance   Topic Date Due    TETANUS VACCINE  Never done    Shingles Vaccine (1 of 2) Never done    Eye Exam  03/18/2022    Hemoglobin A1c  03/13/2023    Lipid Panel  09/13/2023    Mammogram  10/05/2023    Foot Exam  03/02/2024    High Dose Statin  09/01/2024    DEXA Scan  09/13/2025    Colorectal Cancer Screening  10/10/2029    Hepatitis C Screening  Completed       Past Medical History:   Diagnosis Date    Anxiety     Arthritis     Asthma     Back pain     Depression     Diabetes mellitus     Eczema     GERD (gastroesophageal reflux disease)     Hepatitis B     Hyperlipidemia     Hypertension     Seizures        Past Surgical History:   Procedure Laterality Date    BREAST LUMPECTOMY      CHOLECYSTECTOMY         family history is not on file.    Social History     Tobacco Use    Smoking status: Former     Current packs/day: 0.00     Types: Cigarettes     Quit date: 6/2/2019      Years since quittin.2    Smokeless tobacco: Never   Substance Use Topics    Alcohol use: No    Drug use: No       Review of Systems   Constitutional:  Negative for chills and fever.   HENT:  Negative for sore throat.    Respiratory:  Negative for cough and shortness of breath.    Cardiovascular:  Negative for chest pain and palpitations.   Gastrointestinal:  Negative for constipation, diarrhea, nausea and vomiting.   Genitourinary:  Negative for dysuria and hematuria.   Musculoskeletal:  Negative for falls.   Neurological:  Negative for headaches.        Outpatient Encounter Medications as of 2023   Medication Sig Dispense Refill    albuterol (PROVENTIL/VENTOLIN HFA) 90 mcg/actuation inhaler INHALE 2 PUFFS INTO THE LUNGS EVERY 6 HOURS AS NEEDED FOR WHEEZING 54 g 3    ALPRAZolam (XANAX) 0.25 MG tablet Take 1 tablet (0.25 mg total) by mouth 3 (three) times daily. 90 tablet 3    budesonide-formoterol 160-4.5 mcg (SYMBICORT) 160-4.5 mcg/actuation HFAA Inhale 2 puffs into the lungs every 12 (twelve) hours. Controller      cloNIDine (CATAPRES) 0.1 MG tablet Take 1 tablet (0.1 mg total) by mouth daily as needed (BP over 160/100). 30 tablet 1    clotrimazole-betamethasone 1-0.05% (LOTRISONE) cream Apply topically 2 (two) times daily. 45 g 3    ergocalciferol (ERGOCALCIFEROL) 50,000 unit Cap Take 1 capsule (50,000 Units total) by mouth every 7 days. 12 capsule 3    hydroCHLOROthiazide (HYDRODIURIL) 25 MG tablet Take 1 tablet (25 mg total) by mouth once daily. 90 tablet 3    hydrOXYchloroQUINE (PLAQUENIL) 200 mg tablet Take 200 mg by mouth 2 (two) times a day.      LANTUS SOLOSTAR U-100 INSULIN glargine 100 units/mL SubQ pen ADMINISTER 54 UNITS UNDER THE SKIN EVERY DAY 45 mL 0    latanoprost 0.005 % ophthalmic solution       METHOTREXATE ORAL Take 6 tablets by mouth once a week.      ondansetron (ZOFRAN-ODT) 4 MG TbDL Take 1 tablet (4 mg total) by mouth every 12 (twelve) hours. 30 tablet 3    timolol  "maleate 0.5% (TIMOPTIC-XE) 0.5 % SolG Place 1 drop into both eyes nightly.      tiotropium (SPIRIVA WITH HANDIHALER) 18 mcg inhalation capsule   INL THE CONTENTS OF 1 C INTO THE LUNGS ONCE D. CONTROLLER      [DISCONTINUED] omeprazole (PRILOSEC) 20 MG capsule Take 1 capsule (20 mg total) by mouth once daily. 90 capsule 3    alcohol swabs (ALCOHOL PREP PADS) PadM Apply 1 each topically 3 (three) times daily. 300 each 3    blood sugar diagnostic (TRUE METRIX GLUCOSE TEST STRIP) Strp 300 strips by Misc.(Non-Drug; Combo Route) route 3 (three) times daily. 300 strip 3    folic acid (FOLVITE) 1 MG tablet Take 1 tablet by mouth once daily.      lancets 30 gauge Misc 300 lancets by Misc.(Non-Drug; Combo Route) route 3 (three) times daily. 300 each 3    metoprolol succinate (TOPROL-XL) 50 MG 24 hr tablet Take 50 mg by mouth once daily.      omeprazole (PRILOSEC) 20 MG capsule Take 1 capsule (20 mg total) by mouth once daily. 90 capsule 3    pen needle, diabetic 32 gauge x 5/32" Ndle 1 Box by Misc.(Non-Drug; Combo Route) route once daily. Use daily with lantus solostar 90 each 3    rosuvastatin (CRESTOR) 5 MG tablet Take 1 tablet (5 mg total) by mouth once daily. 90 tablet 3    [DISCONTINUED] blood sugar diagnostic (TRUE METRIX GLUCOSE TEST STRIP) Strp 300 strips by Misc.(Non-Drug; Combo Route) route 3 (three) times daily. 300 strip 3    [DISCONTINUED] rosuvastatin (CRESTOR) 20 MG tablet Take 1 tablet (20 mg total) by mouth once daily. (Patient not taking: Reported on 9/1/2023) 90 tablet 3     No facility-administered encounter medications on file as of 9/1/2023.        Review of patient's allergies indicates:   Allergen Reactions    Zantac [ranitidine hcl] Nausea And Vomiting    Amlodipine     Hydralazine     Ondansetron hcl Other (See Comments)    Penicillins     Phenergan [promethazine] Nausea Only       Physical Exam      Vital Signs  Pulse: 68  SpO2: 97 %  BP: 130/70  BP Location: Right arm  Pain Score: " "10-Worst pain ever  Pain Loc: Knee  Height and Weight  Weight: 91.9 kg (202 lb 9.6 oz)]    Physical Exam  Constitutional:       Appearance: She is well-developed.   HENT:      Head: Normocephalic and atraumatic.      Right Ear: External ear normal.      Left Ear: External ear normal.   Eyes:      General:         Right eye: No discharge.         Left eye: No discharge.   Cardiovascular:      Rate and Rhythm: Normal rate and regular rhythm.      Heart sounds: Normal heart sounds. No murmur heard.  Pulmonary:      Effort: Pulmonary effort is normal. No respiratory distress.      Breath sounds: Normal breath sounds.   Abdominal:      General: There is no distension.      Palpations: Abdomen is soft.      Tenderness: There is no abdominal tenderness. There is no guarding.   Musculoskeletal:         General: Normal range of motion.      Cervical back: Normal range of motion.   Skin:     General: Skin is warm and dry.   Neurological:      Mental Status: She is alert and oriented to person, place, and time.   Psychiatric:         Behavior: Behavior normal.        Laboratory:  CBC:  No results for input(s): "WBC", "RBC", "HGB", "HCT", "PLT", "MCV", "MCH", "MCHC" in the last 2160 hours.  CMP:  No results for input(s): "GLU", "CALCIUM", "ALBUMIN", "PROT", "NA", "K", "CO2", "CL", "BUN", "ALKPHOS", "ALT", "AST", "BILITOT" in the last 2160 hours.    Invalid input(s): "CREATININ"  URINALYSIS:  No results for input(s): "COLORU", "CLARITYU", "SPECGRAV", "PHUR", "PROTEINUA", "GLUCOSEU", "BILIRUBINCON", "BLOODU", "WBCU", "RBCU", "BACTERIA", "MUCUS", "NITRITE", "LEUKOCYTESUR", "UROBILINOGEN", "HYALINECASTS" in the last 2160 hours.   LIPIDS:  No results for input(s): "TSH", "HDL", "CHOL", "TRIG", "LDLCALC", "CHOLHDL", "NONHDLCHOL", "TOTALCHOLEST" in the last 2160 hours.  TSH:  No results for input(s): "TSH" in the last 2160 hours.  A1C:  No results for input(s): "HGBA1C" in the last 2160 hours.    Radiology:  No results found in the " last 30 days.     Assessment/Plan     Margarita Orourke is a 68 y.o.female with:    1. Chronic bronchitis, unspecified chronic bronchitis type    2. Hypertension associated with diabetes    3. Hyperlipidemia due to type 2 diabetes mellitus  - rosuvastatin (CRESTOR) 5 MG tablet; Take 1 tablet (5 mg total) by mouth once daily.  Dispense: 90 tablet; Refill: 3    4. Type 2 diabetes mellitus with hyperglycemia, with long-term current use of insulin  - Hemoglobin A1C; Future  - Microalbumin/Creatinine Ratio, Urine; Future  - Lipid Panel; Future  - Comprehensive Metabolic Panel; Future  - CBC Auto Differential; Future  - Hemoglobin A1C  - Microalbumin/Creatinine Ratio, Urine  - Lipid Panel  - Comprehensive Metabolic Panel  - CBC Auto Differential  - Diabetic Eye Screening Photo; Future    5. Primary pulmonary hypertension    6. Interstitial lung disease due to connective tissue disease    7. Atherosclerosis of aorta    8. Rheumatoid arthritis, involving unspecified site, unspecified whether rheumatoid factor present    9. Drug-induced immunodeficiency    10. Encounter for screening mammogram for malignant neoplasm of breast  - Mammo Digital Screening Bilat w/ Abdoul; Future  - Mammo Digital Screening Bilat w/ Abdoul    11. Chronic pain of both knees  - Ambulatory referral/consult to Physical/Occupational Therapy; Future          -restart crestor at 5 mg  -check labs  -eye camera today  -Continue current medications and maintain follow up with specialists.    -Follow up in about 6 months (around 3/1/2024) for follow up of medical problems.       Verena Amaral MD  [facility]

## 2023-09-02 ENCOUNTER — PATIENT MESSAGE (OUTPATIENT)
Dept: ADMINISTRATIVE | Facility: HOSPITAL | Age: 68
End: 2023-09-02
Payer: MEDICARE

## 2023-09-08 DIAGNOSIS — E11.59 HYPERTENSION ASSOCIATED WITH DIABETES: ICD-10-CM

## 2023-09-08 DIAGNOSIS — I15.2 HYPERTENSION ASSOCIATED WITH DIABETES: ICD-10-CM

## 2023-09-08 DIAGNOSIS — I10 ESSENTIAL HYPERTENSION: ICD-10-CM

## 2023-09-08 RX ORDER — HYDROCHLOROTHIAZIDE 25 MG/1
25 TABLET ORAL DAILY
Qty: 90 TABLET | Refills: 3 | Status: SHIPPED | OUTPATIENT
Start: 2023-09-08 | End: 2024-09-07

## 2023-09-08 NOTE — TELEPHONE ENCOUNTER
----- Message from Joni Mclaughlin sent at 9/8/2023  3:31 PM CDT -----  Contact: pt  Type: Requesting to speak with nurse        Who Called: PT  Regarding: hydroCHLOROthiazide (HYDRODIURIL) 25 MG tablet 90 tablet 3 7/11/2022 9/1/2023 No  Sig - Route: Take 1 tablet (25 mg total) by mouth once daily. - Oral      Would the patient rather a call back or a response via [facility]? Call back  Best Call Back Number: [phone]  Additional Information:  [id number] - [address] AT [facility] [address]   Phone:  [phone]  Fax:  [phone]

## 2023-09-27 DIAGNOSIS — E55.9 VITAMIN D DEFICIENCY: ICD-10-CM

## 2023-09-28 RX ORDER — ERGOCALCIFEROL 1.25 MG/1
50000 CAPSULE ORAL
Qty: 12 CAPSULE | Refills: 3 | Status: SHIPPED | OUTPATIENT
Start: 2023-09-28

## 2023-11-21 DIAGNOSIS — F41.9 ANXIETY: ICD-10-CM

## 2023-11-21 NOTE — TELEPHONE ENCOUNTER
Care Due:                  Date            Visit Type   Department     Provider  --------------------------------------------------------------------------------                                EP -                              PRIMARY      [facility]  Last Visit: 09-      CARE ([facility])   LESLEE Amaral                              EP -                              PRIMARY      [facility]  Next Visit: 03-      CARE ([facility])   LESLEE Amaral                                                            Last  Test          Frequency    Reason                     Performed    Due Date  --------------------------------------------------------------------------------    CMP.........  12 months..  LANTUS,                    09- 09-                             hydroCHLOROthiazide,                             rosuvastatin.............    HBA1C.......  6 months...  LANTUS...................  09- 03-    Lipid Panel.  12 months..  rosuvastatin.............  09- 09-    [facility] Embedded Care Due Messages. Reference number: [id number].   11/21/2023 3:15:56 PM CST

## 2023-11-22 RX ORDER — ALPRAZOLAM 0.25 MG/1
0.25 TABLET ORAL 3 TIMES DAILY
Qty: 90 TABLET | Refills: 0 | Status: SHIPPED | OUTPATIENT
Start: 2023-11-22

## 2023-12-02 DIAGNOSIS — E11.65 TYPE 2 DIABETES MELLITUS WITH HYPERGLYCEMIA, WITH LONG-TERM CURRENT USE OF INSULIN: ICD-10-CM

## 2023-12-02 DIAGNOSIS — Z79.4 TYPE 2 DIABETES MELLITUS WITH HYPERGLYCEMIA, WITH LONG-TERM CURRENT USE OF INSULIN: ICD-10-CM

## 2023-12-04 RX ORDER — PEN NEEDLE, DIABETIC 32GX 5/32"
NEEDLE, DISPOSABLE MISCELLANEOUS
Qty: 200 EACH | Refills: 5 | Status: SHIPPED | OUTPATIENT
Start: 2023-12-04

## 2023-12-29 ENCOUNTER — PATIENT OUTREACH (OUTPATIENT)
Dept: ADMINISTRATIVE | Facility: HOSPITAL | Age: 68
End: 2023-12-29
Payer: MEDICARE

## 2024-02-29 ENCOUNTER — TELEPHONE (OUTPATIENT)
Dept: PRIMARY CARE CLINIC | Facility: CLINIC | Age: 69
End: 2024-02-29
Payer: MEDICARE

## 2024-02-29 NOTE — TELEPHONE ENCOUNTER
----- Message from Soledad Noonan sent at 2/29/2024 10:09 AM CST -----  Type:  Needs Medical Advice    Who Called:  Pt    Would the patient rather a call back or a response via [facility]?  call  Best Call Back Number:   [phone]  Additional Information:  Pt has an appt scheduled for tomorrow 3/1/24 and pt wants to verify whether she can still come in even though she has an upper respiratory infection.  Pt states she was prescribed steroids and has been taking medication.  Pt would like a call back.

## 2024-03-01 ENCOUNTER — OFFICE VISIT (OUTPATIENT)
Dept: PRIMARY CARE CLINIC | Facility: CLINIC | Age: 69
End: 2024-03-01
Payer: MEDICARE

## 2024-03-01 VITALS
WEIGHT: 200.19 LBS | HEART RATE: 89 BPM | BODY MASS INDEX: 33.35 KG/M2 | SYSTOLIC BLOOD PRESSURE: 124 MMHG | HEIGHT: 65 IN | OXYGEN SATURATION: 95 % | DIASTOLIC BLOOD PRESSURE: 80 MMHG

## 2024-03-01 DIAGNOSIS — Z12.31 ENCOUNTER FOR SCREENING MAMMOGRAM FOR MALIGNANT NEOPLASM OF BREAST: ICD-10-CM

## 2024-03-01 DIAGNOSIS — D84.821 DRUG-INDUCED IMMUNODEFICIENCY: ICD-10-CM

## 2024-03-01 DIAGNOSIS — E11.65 TYPE 2 DIABETES MELLITUS WITH HYPERGLYCEMIA, WITH LONG-TERM CURRENT USE OF INSULIN: ICD-10-CM

## 2024-03-01 DIAGNOSIS — J84.89 INTERSTITIAL LUNG DISEASE DUE TO CONNECTIVE TISSUE DISEASE: ICD-10-CM

## 2024-03-01 DIAGNOSIS — I15.2 HYPERTENSION ASSOCIATED WITH DIABETES: ICD-10-CM

## 2024-03-01 DIAGNOSIS — J42 CHRONIC BRONCHITIS, UNSPECIFIED CHRONIC BRONCHITIS TYPE: ICD-10-CM

## 2024-03-01 DIAGNOSIS — M35.9 INTERSTITIAL LUNG DISEASE DUE TO CONNECTIVE TISSUE DISEASE: ICD-10-CM

## 2024-03-01 DIAGNOSIS — Z79.4 TYPE 2 DIABETES MELLITUS WITH HYPERGLYCEMIA, WITH LONG-TERM CURRENT USE OF INSULIN: ICD-10-CM

## 2024-03-01 DIAGNOSIS — Z79.899 DRUG-INDUCED IMMUNODEFICIENCY: ICD-10-CM

## 2024-03-01 DIAGNOSIS — F41.9 ANXIETY: Primary | ICD-10-CM

## 2024-03-01 DIAGNOSIS — E11.69 HYPERLIPIDEMIA DUE TO TYPE 2 DIABETES MELLITUS: ICD-10-CM

## 2024-03-01 DIAGNOSIS — M06.9 RHEUMATOID ARTHRITIS, INVOLVING UNSPECIFIED SITE, UNSPECIFIED WHETHER RHEUMATOID FACTOR PRESENT: ICD-10-CM

## 2024-03-01 DIAGNOSIS — I70.0 ATHEROSCLEROSIS OF AORTA: ICD-10-CM

## 2024-03-01 DIAGNOSIS — E78.5 HYPERLIPIDEMIA DUE TO TYPE 2 DIABETES MELLITUS: ICD-10-CM

## 2024-03-01 DIAGNOSIS — E11.59 HYPERTENSION ASSOCIATED WITH DIABETES: ICD-10-CM

## 2024-03-01 DIAGNOSIS — I27.0 PRIMARY PULMONARY HYPERTENSION: ICD-10-CM

## 2024-03-01 PROBLEM — E66.811 CLASS 1 OBESITY DUE TO EXCESS CALORIES WITH SERIOUS COMORBIDITY AND BODY MASS INDEX (BMI) OF 33.0 TO 33.9 IN ADULT: Status: ACTIVE | Noted: 2020-12-18

## 2024-03-01 PROBLEM — E66.09 CLASS 1 OBESITY DUE TO EXCESS CALORIES WITH SERIOUS COMORBIDITY AND BODY MASS INDEX (BMI) OF 33.0 TO 33.9 IN ADULT: Status: ACTIVE | Noted: 2020-12-18

## 2024-03-01 PROCEDURE — 1126F AMNT PAIN NOTED NONE PRSNT: CPT | Mod: CPTII,S$GLB,, | Performed by: INTERNAL MEDICINE

## 2024-03-01 PROCEDURE — 99214 OFFICE O/P EST MOD 30 MIN: CPT | Mod: S$GLB,,, | Performed by: INTERNAL MEDICINE

## 2024-03-01 PROCEDURE — 3079F DIAST BP 80-89 MM HG: CPT | Mod: CPTII,S$GLB,, | Performed by: INTERNAL MEDICINE

## 2024-03-01 PROCEDURE — 3074F SYST BP LT 130 MM HG: CPT | Mod: CPTII,S$GLB,, | Performed by: INTERNAL MEDICINE

## 2024-03-01 PROCEDURE — 99999 PR PBB SHADOW E&M-EST. PATIENT-LVL IV: CPT | Mod: PBBFAC,,, | Performed by: INTERNAL MEDICINE

## 2024-03-01 PROCEDURE — 3008F BODY MASS INDEX DOCD: CPT | Mod: CPTII,S$GLB,, | Performed by: INTERNAL MEDICINE

## 2024-03-01 PROCEDURE — 1159F MED LIST DOCD IN RCRD: CPT | Mod: CPTII,S$GLB,, | Performed by: INTERNAL MEDICINE

## 2024-03-01 NOTE — ASSESSMENT & PLAN NOTE
Sees rheum Dr. Chandler at .  On Plaquenil and methotrexate.  Previously on Cellcept.  UTD on eye exam.  Stable for now.

## 2024-03-01 NOTE — ASSESSMENT & PLAN NOTE
BP controlled today on Toprol XL 50 mg, HCTZ 25 mg daily. No CP/SOB. BP at home has been looking good. Takes Clonidine PRN, has not taken in a few weeks.    Previously on Norvasc, Losartan, Irbesartan and Bystolic.

## 2024-03-01 NOTE — ASSESSMENT & PLAN NOTE
Stable on Spiriva, symbicort with albuterol PRN. No cough, no fever, no SOB.  Seeing Pulm at , Dr. Orellana.  Took a course of steroids for exacerbation recently helped.

## 2024-03-01 NOTE — ASSESSMENT & PLAN NOTE
Stable on crestor 5 mg, no myalgias.  The 10-year ASCVD risk score (Yodit ALMAZAN, et al., 2019) is: 20.5%    Values used to calculate the score:      Age: 68 years      Sex: Female      Is Non- : Yes      Diabetic: Yes      Tobacco smoker: No      Systolic Blood Pressure: 124 mmHg      Is BP treated: Yes      HDL Cholesterol: 47 mg/dL      Total Cholesterol: 177 mg/dL

## 2024-03-01 NOTE — PROGRESS NOTES
[facility] Note    Chief Complaint      Chief Complaint   Patient presents with    Follow-up     6 month        History of Present Illness      Margarita Orourke is a 68 y.o. female who presents today for follow up of DM2.  Patient comes to appointment alone.  Pulm: Dr. Orellana, cardiology: Dr. Richardson, Rheum: Dr. Valdes, optho: Vilma    Had LHC with Dr. Valdez on 8/1 which was unremarkable.  C/o bilateral knee pain, not interested in injections or surgery.    Problem List Items Addressed This Visit       Anxiety - Primary    Current Assessment & Plan     Taking 1/2 tablet of xanax PRN, anxiety and depression have been better.  Has been going to pulm support group.  Effexor made her hallucinate and stopped.  Also stopped buspar because she felt like it made her depressed.  Has had anxiety since she was 17.  Has tried paxil, prozac, lexapro and zoloft in past; had issues with all of them.         Atherosclerosis of aorta    Overview     Media tab 7/1/20 [facility] CT Chest:   Some tortuosity and atherosclerotic calcification of the aorta.            Current Assessment & Plan     On ASA and statin.         COPD (chronic obstructive pulmonary disease)    Current Assessment & Plan     Stable on Spiriva, symbicort with albuterol PRN. No cough, no fever, no SOB.  Seeing Pulm at , Dr. Orellana.  Took a course of steroids for exacerbation recently helped.         Drug-induced immunodeficiency    Hyperlipidemia due to type 2 diabetes mellitus    Current Assessment & Plan     Stable on crestor 5 mg, no myalgias.  The 10-year ASCVD risk score (Yodit ALMAZAN, et al., 2019) is: 20.5%    Values used to calculate the score:      Age: 68 years      Sex: Female      Is Non- : Yes      Diabetic: Yes      Tobacco smoker: No      Systolic Blood Pressure: 124 mmHg      Is BP treated: Yes      HDL Cholesterol: 47 mg/dL      Total Cholesterol: 177 mg/dL           Hypertension associated with  diabetes    Current Assessment & Plan     BP controlled today on Toprol XL 50 mg, HCTZ 25 mg daily. No CP/SOB. BP at home has been looking good. Takes Clonidine PRN, has not taken in a few weeks.    Previously on Norvasc, Losartan, Irbesartan and Bystolic.           Interstitial lung disease due to connective tissue disease    Current Assessment & Plan     Sees Dr. Orellana. Doing Pulmonary Rehab 3 times per week.  2/2 her RA. Does Symbicort and Albuterol PRN.  Breathing has been stable.  CT and PFT's stable in 8/2023.         Primary pulmonary hypertension    Rheumatoid arthritis    Current Assessment & Plan     Sees rheum Dr. Chandler at .  On Plaquenil and methotrexate.  Previously on Cellcept.  UTD on eye exam.  Stable for now.         Type 2 diabetes mellitus with hyperglycemia, with long-term current use of insulin    Current Assessment & Plan     A1C 7.2 in 9/2022, stable on lantus 48 units at night.  Lowest glucose has been 100.  AM glucose today 126.         Relevant Orders    Hemoglobin A1C    Microalbumin/Creatinine Ratio, Urine    Lipid Panel    Comprehensive Metabolic Panel    CBC Auto Differential     Other Visit Diagnoses       Encounter for screening mammogram for malignant neoplasm of breast        Relevant Orders    Mammo Digital Screening Bilat w/ Abdoul                  Health Maintenance   Topic Date Due    TETANUS VACCINE  Never done    Shingles Vaccine (1 of 2) Never done    Hemoglobin A1c  03/13/2023    Lipid Panel  09/13/2023    Mammogram  10/05/2023    Foot Exam  03/02/2024    High Dose Statin  09/01/2024    Eye Exam  09/01/2024    DEXA Scan  11/21/2026    Colorectal Cancer Screening  10/10/2029    Hepatitis C Screening  Completed       Past Medical History:   Diagnosis Date    Anxiety     Arthritis     Asthma     Back pain     Depression     Diabetes mellitus     Eczema     GERD (gastroesophageal reflux disease)     Hepatitis B     Hyperlipidemia     Hypertension     Seizures        Past  Surgical History:   Procedure Laterality Date    BREAST LUMPECTOMY      CHOLECYSTECTOMY         family history is not on file.    Social History     Tobacco Use    Smoking status: Former     Current packs/day: 0.00     Types: Cigarettes     Quit date: 2019     Years since quittin.7    Smokeless tobacco: Never   Substance Use Topics    Alcohol use: No    Drug use: No       Review of Systems   Constitutional:  Negative for chills and fever.   HENT:  Negative for sore throat.    Respiratory:  Negative for cough and shortness of breath.    Cardiovascular:  Negative for chest pain and palpitations.   Gastrointestinal:  Negative for constipation, diarrhea, nausea and vomiting.   Genitourinary:  Negative for dysuria and hematuria.   Musculoskeletal:  Negative for falls.   Neurological:  Negative for headaches.        Outpatient Encounter Medications as of 3/1/2024   Medication Sig Dispense Refill    albuterol (PROVENTIL/VENTOLIN HFA) 90 mcg/actuation inhaler INHALE 2 PUFFS INTO THE LUNGS EVERY 6 HOURS AS NEEDED FOR WHEEZING 54 g 3    alcohol swabs (ALCOHOL PREP PADS) PadM Apply 1 each topically 3 (three) times daily. 300 each 3    ALPRAZolam (XANAX) 0.25 MG tablet TAKE 1 TABLET BY MOUTH THREE TIMES DAILY 90 tablet 0    blood sugar diagnostic (TRUE METRIX GLUCOSE TEST STRIP) Strp 300 strips by Misc.(Non-Drug; Combo Route) route 3 (three) times daily. 300 strip 3    budesonide-formoterol 160-4.5 mcg (SYMBICORT) 160-4.5 mcg/actuation HFAA Inhale 2 puffs into the lungs every 12 (twelve) hours. Controller      cloNIDine (CATAPRES) 0.1 MG tablet Take 1 tablet (0.1 mg total) by mouth daily as needed (BP over 160/100). 30 tablet 1    clotrimazole-betamethasone 1-0.05% (LOTRISONE) cream Apply topically 2 (two) times daily. 45 g 3    ergocalciferol (ERGOCALCIFEROL) 50,000 unit Cap TAKE 1 CAPSULE BY MOUTH EVERY 7 DAYS 12 capsule 3    folic acid (FOLVITE) 1 MG tablet Take 1 tablet by mouth once daily.      hydroCHLOROthiazide  "(HYDRODIURIL) 25 MG tablet Take 1 tablet (25 mg total) by mouth once daily. 90 tablet 3    hydrOXYchloroQUINE (PLAQUENIL) 200 mg tablet Take 200 mg by mouth 2 (two) times a day.      lancets 30 gauge Misc 300 lancets by Misc.(Non-Drug; Combo Route) route 3 (three) times daily. 300 each 3    LANTUS SOLOSTAR U-100 INSULIN glargine 100 units/mL SubQ pen ADMINISTER 54 UNITS UNDER THE SKIN EVERY DAY 45 mL 0    latanoprost 0.005 % ophthalmic solution       METHOTREXATE ORAL Take 6 tablets by mouth once a week.      metoprolol succinate (TOPROL-XL) 50 MG 24 hr tablet Take 50 mg by mouth once daily.      omeprazole (PRILOSEC) 20 MG capsule Take 1 capsule (20 mg total) by mouth once daily. 90 capsule 3    ondansetron (ZOFRAN-ODT) 4 MG TbDL Take 1 tablet (4 mg total) by mouth every 12 (twelve) hours. 30 tablet 3    pen needle, diabetic (BD EDOUARD 2ND GEN PEN NEEDLE) 32 gauge x 5/32" Ndle USE WITH LANTUS EVERY  each 5    rosuvastatin (CRESTOR) 5 MG tablet Take 1 tablet (5 mg total) by mouth once daily. 90 tablet 3    timolol maleate 0.5% (TIMOPTIC-XE) 0.5 % SolG Place 1 drop into both eyes nightly.      tiotropium (SPIRIVA WITH HANDIHALER) 18 mcg inhalation capsule   INL THE CONTENTS OF 1 C INTO THE LUNGS ONCE D. CONTROLLER       No facility-administered encounter medications on file as of 3/1/2024.        Review of patient's allergies indicates:   Allergen Reactions    Zantac [ranitidine hcl] Nausea And Vomiting    Amlodipine     Hydralazine     Ondansetron hcl Other (See Comments)    Penicillins     Phenergan [promethazine] Nausea Only       Physical Exam      Vital Signs  Pulse: 89  SpO2: 95 %  BP: 124/80  BP Location: Right arm  Patient Position: Sitting  Pain Score: 0-No pain  Height and Weight  Height: 5' 5" (165.1 cm)  Weight: 90.8 kg (200 lb 2.8 oz)  BSA (Calculated - sq m): 2.04 sq meters  BMI (Calculated): 33.3  Weight in (lb) to have BMI = 25: 149.9]    Physical Exam  Constitutional:       Appearance: She is " "well-developed.   HENT:      Head: Normocephalic and atraumatic.      Right Ear: External ear normal.      Left Ear: External ear normal.   Eyes:      General:         Right eye: No discharge.         Left eye: No discharge.   Cardiovascular:      Rate and Rhythm: Normal rate and regular rhythm.      Heart sounds: Normal heart sounds. No murmur heard.  Pulmonary:      Effort: Pulmonary effort is normal. No respiratory distress.      Breath sounds: Normal breath sounds.   Abdominal:      General: There is no distension.      Palpations: Abdomen is soft.      Tenderness: There is no abdominal tenderness. There is no guarding.   Musculoskeletal:         General: Normal range of motion.      Cervical back: Normal range of motion.   Skin:     General: Skin is warm and dry.   Neurological:      Mental Status: She is alert and oriented to person, place, and time.   Psychiatric:         Behavior: Behavior normal.          Laboratory:  CBC:  No results for input(s): "WBC", "RBC", "HGB", "HCT", "PLT", "MCV", "MCH", "MCHC" in the last 2160 hours.  CMP:  No results for input(s): "GLU", "CALCIUM", "ALBUMIN", "PROT", "NA", "K", "CO2", "CL", "BUN", "ALKPHOS", "ALT", "AST", "BILITOT" in the last 2160 hours.    Invalid input(s): "CREATININ"  URINALYSIS:  No results for input(s): "COLORU", "CLARITYU", "SPECGRAV", "PHUR", "PROTEINUA", "GLUCOSEU", "BILIRUBINCON", "BLOODU", "WBCU", "RBCU", "BACTERIA", "MUCUS", "NITRITE", "LEUKOCYTESUR", "UROBILINOGEN", "HYALINECASTS" in the last 2160 hours.   LIPIDS:  No results for input(s): "TSH", "HDL", "CHOL", "TRIG", "LDLCALC", "CHOLHDL", "NONHDLCHOL", "TOTALCHOLEST" in the last 2160 hours.  TSH:  No results for input(s): "TSH" in the last 2160 hours.  A1C:  No results for input(s): "HGBA1C" in the last 2160 hours.    Radiology:  No results found in the last 30 days.     Assessment/Plan     Margarita Orourke is a 68 y.o.female with:    1. Anxiety    2. Chronic bronchitis, unspecified chronic " bronchitis type    3. Interstitial lung disease due to connective tissue disease    4. Rheumatoid arthritis, involving unspecified site, unspecified whether rheumatoid factor present    5. Atherosclerosis of aorta    6. Hyperlipidemia due to type 2 diabetes mellitus    7. Hypertension associated with diabetes    8. Type 2 diabetes mellitus with hyperglycemia, with long-term current use of insulin  - Hemoglobin A1C; Future  - Microalbumin/Creatinine Ratio, Urine; Future  - Lipid Panel; Future  - Comprehensive Metabolic Panel; Future  - CBC Auto Differential; Future  - Hemoglobin A1C  - Microalbumin/Creatinine Ratio, Urine  - Lipid Panel  - Comprehensive Metabolic Panel  - CBC Auto Differential    9. Primary pulmonary hypertension    10. Drug-induced immunodeficiency    11. Encounter for screening mammogram for malignant neoplasm of breast  - Mammo Digital Screening Bilat w/ Abdoul; Future  - Mammo Digital Screening Bilat w/ Abdoul          -labs and MMG ordered  -schedule eye exam with Dr. Esparza  -Continue current medications and maintain follow up with specialists.    -Follow up in about 6 months (around 9/1/2024) for follow up of medical problems.       Verena Amaral MD  [facility]

## 2024-03-01 NOTE — ASSESSMENT & PLAN NOTE
Taking 1/2 tablet of xanax PRN, anxiety and depression have been better.  Has been going to [facility] support group.  Effexor made her hallucinate and stopped.  Also stopped buspar because she felt like it made her depressed.  Has had anxiety since she was 17.  Has tried paxil, prozac, lexapro and zoloft in past; had issues with all of them.

## 2024-03-02 ENCOUNTER — PATIENT MESSAGE (OUTPATIENT)
Dept: ADMINISTRATIVE | Facility: HOSPITAL | Age: 69
End: 2024-03-02
Payer: MEDICARE

## 2024-03-15 LAB — BCS RECOMMENDATION EXT: NORMAL

## 2024-03-19 ENCOUNTER — TELEPHONE (OUTPATIENT)
Dept: PRIMARY CARE CLINIC | Facility: CLINIC | Age: 69
End: 2024-03-19
Payer: MEDICARE

## 2024-03-20 ENCOUNTER — PATIENT OUTREACH (OUTPATIENT)
Dept: ADMINISTRATIVE | Facility: HOSPITAL | Age: 69
End: 2024-03-20
Payer: MEDICARE

## 2024-03-20 NOTE — PROGRESS NOTES
Health Maintenance Due   Topic Date Due    TETANUS VACCINE  Never done    Shingles Vaccine (1 of 2) Never done    RSV Vaccine (Age 60+ and Pregnant patients) (1 - 1-dose 60+ series) Never done    Hemoglobin A1c  03/13/2023    Influenza Vaccine (1) 09/01/2023    COVID-19 Vaccine (5 - 2023-24 season) 09/01/2023    Diabetes Urine Screening  09/13/2023    Lipid Panel  09/13/2023    Foot Exam  03/02/2024     Chart review completed.  Updated. Triggered Links. Immunizations reviewed and updated. Care Everywhere Updated. Care Team Updated.  Imported outside mammogram results from [facility] into /media.

## 2024-05-14 DIAGNOSIS — E11.59 HYPERTENSION ASSOCIATED WITH DIABETES: ICD-10-CM

## 2024-05-14 DIAGNOSIS — F41.9 ANXIETY: ICD-10-CM

## 2024-05-14 DIAGNOSIS — I15.2 HYPERTENSION ASSOCIATED WITH DIABETES: ICD-10-CM

## 2024-05-14 RX ORDER — ALPRAZOLAM 0.25 MG/1
0.25 TABLET ORAL 3 TIMES DAILY
Qty: 90 TABLET | Refills: 0 | Status: SHIPPED | OUTPATIENT
Start: 2024-05-14

## 2024-05-14 RX ORDER — CLONIDINE HYDROCHLORIDE 0.1 MG/1
0.1 TABLET ORAL DAILY PRN
Qty: 30 TABLET | Refills: 1 | Status: SHIPPED | OUTPATIENT
Start: 2024-05-14

## 2024-05-14 NOTE — TELEPHONE ENCOUNTER
----- Message from Betsy Gonzalez sent at 5/14/2024  2:43 PM CDT -----  Needs advice from nurse:      Who Called:pt  Regarding:needs refill on cloNIDine (CATAPRES) 0.1 MG tablet 30 tablet 1 9/1/2022  Sig - Route: Take 1 tablet (0.1 mg total) by mouth daily as needed (BP over 160/100). - Oral     ALPRAZolam (XANAX) 0.25 MG tablet 90 tablet 0 11/22/2023 -   Sig - Route: TAKE 1 TABLET BY MOUTH THREE TIMES DAILY - Oral         Would the patient rather a call back or VIA [facility]?  Best Call Back number:[phone]  Additional Info:[id number] - [place] - [address] AT [facility] [place] [address] (Ph: [phone])

## 2024-05-14 NOTE — TELEPHONE ENCOUNTER
Care Due:                  Date            Visit Type   Department     Provider  --------------------------------------------------------------------------------                                EP -                              PRIMARY      [facility]  Last Visit: 03-      CARE ([facility])   LESLEE Amaral  Next Visit: None Scheduled  None         None Found                                                            Last  Test          Frequency    Reason                     Performed    Due Date  --------------------------------------------------------------------------------    CMP.........  12 months..  LANTUS, ergocalciferol,    09- 09-                             hydroCHLOROthiazide,                             rosuvastatin.............    HBA1C.......  6 months...  LANTUS...................  09- 03-    Lipid Panel.  12 months..  rosuvastatin.............  09- 09-    Vitamin D...  12 months..  ergocalciferol...........  Not Found    Overdue    [employer] Embedded Care Due Messages. Reference number: [id number].   5/14/2024 2:52:10 PM CDT

## 2024-05-28 LAB
LEFT EYE DM RETINOPATHY: POSITIVE
RIGHT EYE DM RETINOPATHY: POSITIVE

## 2024-05-31 ENCOUNTER — PATIENT OUTREACH (OUTPATIENT)
Dept: ADMINISTRATIVE | Facility: HOSPITAL | Age: 69
End: 2024-05-31
Payer: MEDICARE

## 2024-05-31 NOTE — PROGRESS NOTES
Health Maintenance Due   Topic Date Due    TETANUS VACCINE  Never done    Shingles Vaccine (1 of 2) Never done    RSV Vaccine (Age 60+ and Pregnant patients) (1 - 1-dose 60+ series) Never done    Hemoglobin A1c  03/13/2023    COVID-19 Vaccine (5 - 2023-24 season) 09/01/2023    Diabetes Urine Screening  09/13/2023    Lipid Panel  09/13/2023    Foot Exam  03/02/2024       Chart review completed. HM Updated. Triggered Links. Immunizations reviewed and updated. Care Everywhere Updated. Care Team Updated.  Imported outside eye exam results from Dr. Esparza into HM/media.  Positive background retinopathy.

## 2024-06-01 ENCOUNTER — PATIENT MESSAGE (OUTPATIENT)
Dept: ADMINISTRATIVE | Facility: HOSPITAL | Age: 69
End: 2024-06-01
Payer: MEDICARE

## 2024-06-18 RX ORDER — OMEPRAZOLE 20 MG/1
20 CAPSULE, DELAYED RELEASE ORAL
Qty: 90 CAPSULE | Refills: 2 | Status: SHIPPED | OUTPATIENT
Start: 2024-06-18

## 2024-06-18 NOTE — TELEPHONE ENCOUNTER
Refill Decision Note   Margarita Orourke  is requesting a refill authorization.  Brief Assessment and Rationale for Refill:  Approve     Medication Therapy Plan:         Comments:     Note composed:8:56 AM 06/18/2024

## 2024-06-18 NOTE — TELEPHONE ENCOUNTER
No care due was identified.  [facility] Embedded Care Due Messages. Reference number: [id number].   6/18/2024 8:07:43 AM CDT

## 2024-06-19 RX ORDER — CALCIUM CITRATE/VITAMIN D3 200MG-6.25
TABLET ORAL 3 TIMES DAILY
Qty: 300 STRIP | Refills: 3 | Status: SHIPPED | OUTPATIENT
Start: 2024-06-19

## 2024-06-19 NOTE — TELEPHONE ENCOUNTER
Refill Decision Note   Margarita Orourke  is requesting a refill authorization.  Brief Assessment and Rationale for Refill:  Approve     Medication Therapy Plan:        Comments:     Note composed:5:32 AM 06/19/2024

## 2024-06-19 NOTE — TELEPHONE ENCOUNTER
No care due was identified.  [facility] Embedded Care Due Messages. Reference number: [id number].   6/19/2024 1:21:00 AM CDT

## 2024-08-06 DIAGNOSIS — I15.2 HYPERTENSION ASSOCIATED WITH DIABETES: ICD-10-CM

## 2024-08-06 DIAGNOSIS — I10 ESSENTIAL HYPERTENSION: ICD-10-CM

## 2024-08-06 DIAGNOSIS — E11.59 HYPERTENSION ASSOCIATED WITH DIABETES: ICD-10-CM

## 2024-08-07 RX ORDER — HYDROCHLOROTHIAZIDE 25 MG/1
25 TABLET ORAL
Qty: 90 TABLET | Refills: 0 | Status: SHIPPED | OUTPATIENT
Start: 2024-08-07 | End: 2024-08-08 | Stop reason: SDUPTHER

## 2024-08-08 DIAGNOSIS — I15.2 HYPERTENSION ASSOCIATED WITH DIABETES: ICD-10-CM

## 2024-08-08 DIAGNOSIS — Z79.4 TYPE 2 DIABETES MELLITUS WITH HYPERGLYCEMIA, WITH LONG-TERM CURRENT USE OF INSULIN: ICD-10-CM

## 2024-08-08 DIAGNOSIS — E11.59 HYPERTENSION ASSOCIATED WITH DIABETES: ICD-10-CM

## 2024-08-08 DIAGNOSIS — I10 ESSENTIAL HYPERTENSION: ICD-10-CM

## 2024-08-08 DIAGNOSIS — E11.65 TYPE 2 DIABETES MELLITUS WITH HYPERGLYCEMIA, WITH LONG-TERM CURRENT USE OF INSULIN: ICD-10-CM

## 2024-08-08 DIAGNOSIS — F41.9 ANXIETY: ICD-10-CM

## 2024-08-09 RX ORDER — CLONIDINE HYDROCHLORIDE 0.1 MG/1
0.1 TABLET ORAL DAILY PRN
Qty: 30 TABLET | Refills: 1 | Status: SHIPPED | OUTPATIENT
Start: 2024-08-09

## 2024-08-09 RX ORDER — ALPRAZOLAM 0.25 MG/1
0.25 TABLET ORAL 3 TIMES DAILY
Qty: 90 TABLET | Refills: 0 | Status: SHIPPED | OUTPATIENT
Start: 2024-08-09

## 2024-08-09 RX ORDER — INSULIN GLARGINE 100 [IU]/ML
54 INJECTION, SOLUTION SUBCUTANEOUS DAILY
Qty: 45 ML | Refills: 0 | Status: SHIPPED | OUTPATIENT
Start: 2024-08-09

## 2024-08-09 RX ORDER — HYDROCHLOROTHIAZIDE 25 MG/1
25 TABLET ORAL DAILY
Qty: 90 TABLET | Refills: 0 | Status: SHIPPED | OUTPATIENT
Start: 2024-08-09

## 2024-09-08 DIAGNOSIS — F41.9 ANXIETY: ICD-10-CM

## 2024-09-08 NOTE — TELEPHONE ENCOUNTER
No care due was identified.  [facility] Embedded Care Due Messages. Reference number: [id number].   9/08/2024 5:07:53 PM CDT

## 2024-09-13 RX ORDER — ALPRAZOLAM 0.25 MG/1
0.25 TABLET ORAL 3 TIMES DAILY
Qty: 90 TABLET | Refills: 0 | Status: SHIPPED | OUTPATIENT
Start: 2024-09-13

## 2024-10-08 ENCOUNTER — OFFICE VISIT (OUTPATIENT)
Dept: PRIMARY CARE CLINIC | Facility: CLINIC | Age: 69
End: 2024-10-08
Payer: MEDICARE

## 2024-10-08 ENCOUNTER — LAB VISIT (OUTPATIENT)
Dept: LAB | Facility: HOSPITAL | Age: 69
End: 2024-10-08
Attending: NURSE PRACTITIONER
Payer: MEDICARE

## 2024-10-08 VITALS
BODY MASS INDEX: 31.67 KG/M2 | SYSTOLIC BLOOD PRESSURE: 120 MMHG | HEART RATE: 52 BPM | OXYGEN SATURATION: 100 % | HEIGHT: 65 IN | WEIGHT: 190.06 LBS | DIASTOLIC BLOOD PRESSURE: 84 MMHG | RESPIRATION RATE: 16 BRPM

## 2024-10-08 DIAGNOSIS — F41.9 ANXIETY: ICD-10-CM

## 2024-10-08 DIAGNOSIS — E11.69 HYPERLIPIDEMIA DUE TO TYPE 2 DIABETES MELLITUS: ICD-10-CM

## 2024-10-08 DIAGNOSIS — M35.9 INTERSTITIAL LUNG DISEASE DUE TO CONNECTIVE TISSUE DISEASE: ICD-10-CM

## 2024-10-08 DIAGNOSIS — E11.59 HYPERTENSION ASSOCIATED WITH DIABETES: Primary | ICD-10-CM

## 2024-10-08 DIAGNOSIS — E78.5 HYPERLIPIDEMIA DUE TO TYPE 2 DIABETES MELLITUS: ICD-10-CM

## 2024-10-08 DIAGNOSIS — J42 CHRONIC BRONCHITIS, UNSPECIFIED CHRONIC BRONCHITIS TYPE: ICD-10-CM

## 2024-10-08 DIAGNOSIS — Z79.4 TYPE 2 DIABETES MELLITUS WITH HYPERGLYCEMIA, WITH LONG-TERM CURRENT USE OF INSULIN: ICD-10-CM

## 2024-10-08 DIAGNOSIS — F51.01 PRIMARY INSOMNIA: ICD-10-CM

## 2024-10-08 DIAGNOSIS — I27.0 PRIMARY PULMONARY HYPERTENSION: ICD-10-CM

## 2024-10-08 DIAGNOSIS — I15.2 HYPERTENSION ASSOCIATED WITH DIABETES: Primary | ICD-10-CM

## 2024-10-08 DIAGNOSIS — E11.65 TYPE 2 DIABETES MELLITUS WITH HYPERGLYCEMIA, WITH LONG-TERM CURRENT USE OF INSULIN: ICD-10-CM

## 2024-10-08 DIAGNOSIS — F41.0 PANIC: ICD-10-CM

## 2024-10-08 DIAGNOSIS — J84.89 INTERSTITIAL LUNG DISEASE DUE TO CONNECTIVE TISSUE DISEASE: ICD-10-CM

## 2024-10-08 DIAGNOSIS — Z87.891 HISTORY OF TOBACCO ABUSE: ICD-10-CM

## 2024-10-08 LAB
ESTIMATED AVG GLUCOSE: 154 MG/DL (ref 68–131)
HBA1C MFR BLD: 7 % (ref 4–5.6)

## 2024-10-08 PROCEDURE — 36415 COLL VENOUS BLD VENIPUNCTURE: CPT | Performed by: NURSE PRACTITIONER

## 2024-10-08 PROCEDURE — 99999 PR PBB SHADOW E&M-EST. PATIENT-LVL IV: CPT | Mod: PBBFAC,,, | Performed by: NURSE PRACTITIONER

## 2024-10-08 PROCEDURE — 83036 HEMOGLOBIN GLYCOSYLATED A1C: CPT | Performed by: NURSE PRACTITIONER

## 2024-10-08 PROCEDURE — 99214 OFFICE O/P EST MOD 30 MIN: CPT | Mod: S$GLB,,, | Performed by: NURSE PRACTITIONER

## 2024-10-08 RX ORDER — SERTRALINE HYDROCHLORIDE 25 MG/1
25 TABLET, FILM COATED ORAL DAILY
Qty: 30 TABLET | Refills: 11 | Status: SHIPPED | OUTPATIENT
Start: 2024-10-08 | End: 2025-10-08

## 2024-10-08 NOTE — PROGRESS NOTES
[facility] Note    Chief Complaint      Chief Complaint   Patient presents with    Follow-up     History of Present Illness      Margarita Orourke is a 69 y.o. female patient of Dr. Amaral with chronic conditions of anxiety, COPD, ILD, hypertension, HLD, RA, DM, obesity who presents today for 6 month follow up with chronic conditions.    Labs     Anxiety-Primary     Current Assessment & Plan       Taking 1/2 tablet of xanax PRN, anxiety and depression have been better.  Has been going to pulm support group.  Effexor made her hallucinate and stopped.  Also stopped buspar because she felt like it made her depressed.  Has had anxiety since she was 17.  Has tried paxil, prozac, lexapro and zoloft in past; had issues with all of them.           Atherosclerosis of aorta     Overview       Media tab 7/1/20 [facility] CT Chest:   Some tortuosity and atherosclerotic calcification of the aorta.               Current Assessment & Plan       On ASA and statin.           COPD (chronic obstructive pulmonary disease)     Current Assessment & Plan       Stable on Spiriva, symbicort with albuterol PRN. No cough, no fever, no SOB.  Seeing Pulm at , Dr. Orellana.  Took a course of steroids for exacerbation recently helped.           Drug-induced immunodeficiency     Hyperlipidemia due to type 2 diabetes mellitus     Current Assessment & Plan       Stable on crestor 5 mg, no myalgias.  The 10-year ASCVD risk score (Yodit ALMAZAN, et al., 2019) is: 20.5%    Values used to calculate the score:      Age: 68 years      Sex: Female      Is Non- : Yes      Diabetic: Yes      Tobacco smoker: No      Systolic Blood Pressure: 124 mmHg      Is BP treated: Yes      HDL Cholesterol: 47 mg/dL      Total Cholesterol: 177 mg/dL              Hypertension associated with diabetes     Current Assessment & Plan       BP controlled today on Toprol XL 50 mg, HCTZ 25 mg daily. No CP/SOB. BP at home has been looking  good. Takes Clonidine PRN, has not taken in a few weeks.     Previously on Norvasc, Losartan, Irbesartan and Bystolic.             Interstitial lung disease due to connective tissue disease     Current Assessment & Plan       Sees Dr. Orellana. Doing Pulmonary Rehab 3 times per week.  2/2 her RA. Does Symbicort and Albuterol PRN.  Breathing has been stable.  CT and PFT's stable in 8/2023.           Primary pulmonary hypertension     Rheumatoid arthritis     Current Assessment & Plan       Sees rheum Dr. Chandler at .  On Plaquenil and methotrexate.  Previously on Cellcept.  UTD on eye exam.  Stable for now.           Type 2 diabetes mellitus with hyperglycemia, with long-term current use of insulin     Current Assessment & Plan       A1C 7.2 in 9/2022, stable on lantus 48 units at night.  Lowest glucose has been 100.  AM glucose today 126.                                          History of Present Illness    CHIEF COMPLAINT:  Margarita presents today for follow up on pulmonary hypertension and anxiety.    PULMONARY HYPERTENSION:  She reports pressure buildup in her left lung, which she associates with pulmonary hypertension. She is scheduled for pending pulmonary function tests (PFTs) and a six-minute walk test to reassess her condition. She has a history of low oxygen levels, previously at 70%, but notes improvement with current oxygen therapy, now reaching 95-96% at times. She is participating in pulmonary rehabilitation, working out on machines at the hospital.    ANXIETY AND PANIC ATTACKS:  She reports a history of anxiety since age 18, experiencing panic attacks with elevated blood pressure (160-180/120) and heart rate. She experiences chest pain from front to back during attacks, which occur unpredictably and can be triggered by stress or emotional responses to events. For management, she takes Xanax and Clonidine as needed, in addition to her regular blood pressure medication. She previously used Lorazepam  but discontinued due to concerns about dependence. She expresses interest in trying a daily, non-addictive medication to better manage her anxiety and prevent panic attacks, noting past negative experiences with some psychiatric medications causing excessive weakness.    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 She reports a history of one possible seizure in the past, which required intervention to prevent airway obstruction.    FAMILY HISTORY:  She reports a significant family history of autoimmune diseases, including three siblings with autoimmune conditions. Her 18-year-old granddaughter also has an autoimmune condition affecting her chest and arms, requiring surgical intervention.    SOCIAL HISTORY:  She was born and raised in [place]. She is  and mother of five boys. 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 She performs household chores to maintain mobility and engages in reading and writing activities to preserve cognitive function.    MEDICATIONS:  She is currently taking Mexiletine. A prescription for Mexiletine fluoride was written by Dr. Orellana to address pressure on the left side, but it was not filled due to lack of approval. She needs to retake tests to ensure the medication and dosage are appropriate.    LABS:  She reports recent labs were normal. She is due for an A1C test, which she agrees to complete today.      ROS:  Cardiovascular: +chest pressure  Respiratory: -shortness of breath  Psychiatric: +anxiety         Health Maintenance   Topic Date Due    TETANUS VACCINE  Never done    Shingles Vaccine (1 of 2) Never done    High Dose Statin  Never done    Lipid Panel  09/13/2023    Foot Exam  03/02/2024    Mammogram  03/15/2025    Hemoglobin A1c  04/08/2025    Eye Exam  05/28/2025    DEXA Scan  11/21/2026    Colorectal Cancer Screening  10/10/2029    Hepatitis C  Screening  Completed       Past Medical History:   Diagnosis Date    Anxiety     Arthritis     Asthma     Back pain     Depression     Diabetes mellitus     Eczema     GERD (gastroesophageal reflux disease)     Hepatitis B     Hyperlipidemia     Hypertension     Seizures        Past Surgical History:   Procedure Laterality Date    BREAST LUMPECTOMY      CHOLECYSTECTOMY         family history is not on file.    Social History     Tobacco Use    Smoking status: Former     Current packs/day: 0.00     Types: Cigarettes     Quit date: 2019     Years since quittin.3    Smokeless tobacco: Never   Substance Use Topics    Alcohol use: No    Drug use: No       Outpatient Encounter Medications as of 10/8/2024   Medication Sig Dispense Refill    albuterol (PROVENTIL/VENTOLIN HFA) 90 mcg/actuation inhaler INHALE 2 PUFFS INTO THE LUNGS EVERY 6 HOURS AS NEEDED FOR WHEEZING 54 g 3    alcohol swabs (ALCOHOL PREP PADS) PadM Apply 1 each topically 3 (three) times daily. 300 each 3    ALPRAZolam (XANAX) 0.25 MG tablet TAKE 1 TABLET THREE TIMES DAILY 90 tablet 0    budesonide-formoterol 160-4.5 mcg (SYMBICORT) 160-4.5 mcg/actuation HFAA Inhale 2 puffs into the lungs every 12 (twelve) hours. Controller      cloNIDine (CATAPRES) 0.1 MG tablet Take 1 tablet (0.1 mg total) by mouth daily as needed (BP over 160/100). 30 tablet 1    clotrimazole-betamethasone 1-0.05% (LOTRISONE) cream Apply topically 2 (two) times daily. 45 g 3    ergocalciferol (ERGOCALCIFEROL) 50,000 unit Cap TAKE 1 CAPSULE BY MOUTH EVERY 7 DAYS 12 capsule 3    folic acid (FOLVITE) 1 MG tablet Take 1 tablet by mouth once daily.      hydroCHLOROthiazide (HYDRODIURIL) 25 MG tablet Take 1 tablet (25 mg total) by mouth once daily. 90 tablet 0    hydrOXYchloroQUINE (PLAQUENIL) 200 mg tablet Take 200 mg by mouth 2 (two) times a day.      insulin glargine U-100, Lantus, (LANTUS SOLOSTAR U-100 INSULIN) 100 unit/mL (3 mL) InPn pen Inject 54 Units into the skin once daily. 45  "mL 0    lancets 30 gauge Misc 300 lancets by Misc.(Non-Drug; Combo Route) route 3 (three) times daily. 300 each 3    latanoprost 0.005 % ophthalmic solution       METHOTREXATE ORAL Take 6 tablets by mouth once a week.      metoprolol succinate (TOPROL-XL) 50 MG 24 hr tablet Take 50 mg by mouth once daily.      omeprazole (PRILOSEC) 20 MG capsule TAKE 1 CAPSULE(20 MG) BY MOUTH EVERY DAY 90 capsule 2    ondansetron (ZOFRAN-ODT) 4 MG TbDL Take 1 tablet (4 mg total) by mouth every 12 (twelve) hours. 30 tablet 3    pen needle, diabetic (BD EDOUARD 2ND GEN PEN NEEDLE) 32 gauge x 5/32" Ndle USE WITH LANTUS EVERY  each 5    timolol maleate 0.5% (TIMOPTIC-XE) 0.5 % SolG Place 1 drop into both eyes nightly.      tiotropium (SPIRIVA WITH HANDIHALER) 18 mcg inhalation capsule   INL THE CONTENTS OF 1 C INTO THE LUNGS ONCE D. CONTROLLER      TRUE METRIX GLUCOSE TEST STRIP Strp TEST THREE TIMES DAILY 300 strip 3    rosuvastatin (CRESTOR) 5 MG tablet Take 1 tablet (5 mg total) by mouth once daily. 90 tablet 3    sertraline (ZOLOFT) 25 MG tablet Take 1 tablet (25 mg total) by mouth once daily. 30 tablet 11    [DISCONTINUED] ALPRAZolam (XANAX) 0.25 MG tablet Take 1 tablet (0.25 mg total) by mouth 3 (three) times daily. 90 tablet 0     No facility-administered encounter medications on file as of 10/8/2024.        Review of patient's allergies indicates:   Allergen Reactions    Zantac [ranitidine hcl] Nausea And Vomiting    Amlodipine     Hydralazine     Ondansetron hcl Other (See Comments)    Penicillins     Phenergan [promethazine] Nausea Only       Physical Exam      Vital Signs  Pulse: (!) 52  Resp: 16  SpO2: 100 %  BP: 120/84  BP Location: Right arm  Patient Position: Sitting  Height and Weight  Height: 5' 5" (165.1 cm)  Weight: 86.2 kg (190 lb 0.6 oz)  BSA (Calculated - sq m): 1.99 sq meters  BMI (Calculated): 31.6  Weight in (lb) to have BMI = 25: 149.9    Physical Exam  Vitals and nursing note reviewed.   Constitutional:  "      General: She is not in acute distress.     Appearance: Normal appearance. She is well-developed. She is not ill-appearing.   HENT:      Head: Normocephalic and atraumatic.      Right Ear: External ear normal.      Left Ear: External ear normal.   Eyes:      Conjunctiva/sclera: Conjunctivae normal.      Pupils: Pupils are equal, round, and reactive to light.   Neck:      Thyroid: No thyromegaly.      Vascular: No JVD.      Trachea: No tracheal deviation.   Cardiovascular:      Rate and Rhythm: Normal rate and regular rhythm.      Heart sounds: Normal heart sounds. No murmur heard.  Pulmonary:      Effort: Pulmonary effort is normal.      Breath sounds: Normal breath sounds.   Chest:      Chest wall: No tenderness.   Abdominal:      General: Bowel sounds are normal.      Palpations: Abdomen is soft.      Tenderness: There is no abdominal tenderness. There is no guarding.   Musculoskeletal:         General: Normal range of motion.      Cervical back: Normal range of motion and neck supple.   Lymphadenopathy:      Cervical: No cervical adenopathy.   Skin:     General: Skin is warm and dry.   Neurological:      General: No focal deficit present.      Mental Status: She is alert and oriented to person, place, and time.   Psychiatric:         Mood and Affect: Mood normal.         Behavior: Behavior normal.         Thought Content: Thought content normal.         Judgment: Judgment normal.          Laboratory:  CBC:  Lab Results   Component Value Date    WBC 6.6 09/13/2022    RBC 4.34 09/13/2022    HGB 12.7 09/13/2022    HCT 38.3 09/13/2022     09/13/2022    MCV 88.5 09/13/2022    MCH 29.3 09/13/2022    MCHC 33.1 09/13/2022    MCHC 34.1 06/23/2021    MCHC 34.1 01/28/2021     CMP:  Lab Results   Component Value Date     08/16/2024    CALCIUM 10.0 08/16/2024    ALBUMIN 3.9 08/16/2024    PROT 6.8 09/13/2022     08/16/2024    K 3.9 08/16/2024    CO2 29 08/16/2024     09/13/2022    BUN 11  "08/16/2024    ALKPHOS 65 08/16/2024    ALT 10 08/16/2024    AST 11 08/16/2024    BILITOT 0.4 08/16/2024    BILITOT 0.2 03/20/2024    BILITOT 0.3 09/13/2022     URINALYSIS:  Lab Results   Component Value Date    COLORU Yellow 01/17/2019    SPECGRAV <=1.005 (A) 01/17/2019    PHUR 6.0 01/17/2019    PROTEINUA Negative 01/17/2019    NITRITE Negative 01/17/2019    LEUKOCYTESUR Negative 01/17/2019    UROBILINOGEN Negative 01/17/2019      LIPIDS:  Lab Results   Component Value Date    HDL 47 09/13/2022    HDL 45 03/22/2022    HDL 50 06/23/2021    CHOL 177 09/13/2022    CHOL 183 03/22/2022    CHOL 189 06/23/2021    TRIG 52 09/13/2022    TRIG 77 03/22/2022    TRIG 64 06/23/2021    LDLCALC 122 09/13/2022    LDLCALC 126 (H) 03/22/2022    LDLCALC 129 (H) 06/23/2021    CHOLHDL 3.9 10/08/2019    NONHDLCHOL 139 (H) 06/23/2021    NONHDLCHOL 125 05/26/2020    NONHDLCHOL 127 10/08/2019     TSH:  No results found for: "TSH"  A1C:  Lab Results   Component Value Date    HGBA1C 7.0 (H) 10/08/2024    HGBA1C 7.2 (H) 09/13/2022    HGBA1C 8.4 (H) 03/22/2022    HGBA1C 7.6 (H) 06/23/2021    HGBA1C 7.5 (H) 01/28/2021    HGBA1C 7.3 (H) 05/26/2020    HGBA1C 7.2 (H) 01/21/2020    HGBA1C 7.4 (H) 10/08/2019         Assessment/Plan     Margarita Orourke is a 69 y.o.female with:    Assessment & Plan    Assessed patient's anxiety symptoms and current medication regimen  Evaluated patient's history of medication trials, including lorazepam and BuSpar  Will initiate Zoloft for daily anxiety management  Reviewed recent lab work, noting normal results from previous month  Determined need for A1C update due to last test being in March with Dr. Amaral    ANXIETY:  - Explained difference between daily anxiety medications and as-needed medications like Xanax.  - Discussed potential benefits of using a daily medication to manage anxiety and reduce panic attacks.  - Informed patient about Zoloft as an option for daily anxiety management.  - Started Zoloft " (sertraline) at a low dose for 30 days to assess efficacy.  - Continued Xanax (alprazolam) as needed for breakthrough anxiety or stress.  - Follow up in 30 days to assess Zoloft efficacy and consider dose adjustment or 90-day prescription.    PULMONARY REHABILITATION:  - Margarita to continue current physical activities and household chores to maintain mobility.  - Margarita to maintain participation in pulmonary rehab program at the hospital.    LABS:  - Ordered A1C lab test.         Hypertension associated with diabetes    Hyperlipidemia due to type 2 diabetes mellitus    Primary pulmonary hypertension    Panic  -     sertraline (ZOLOFT) 25 MG tablet; Take 1 tablet (25 mg total) by mouth once daily.  Dispense: 30 tablet; Refill: 11    Type 2 diabetes mellitus with hyperglycemia, with long-term current use of insulin  -     Hemoglobin A1C; Future; Expected date: 10/08/2024    Chronic bronchitis, unspecified chronic bronchitis type    Interstitial lung disease due to connective tissue disease    Anxiety    Primary insomnia    History of tobacco abuse          Health Maintenance Due   Topic Date Due    TETANUS VACCINE  Never done    Shingles Vaccine (1 of 2) Never done    High Dose Statin  Never done    RSV Vaccine (Age 60+ and Pregnant patients) (1 - Risk 60-74 years 1-dose series) Never done    Diabetes Urine Screening  09/13/2023    Lipid Panel  09/13/2023    Foot Exam  03/02/2024    Influenza Vaccine (1) 09/01/2024    COVID-19 Vaccine (5 - 2024-25 season) 09/01/2024        I spent 36 minutes on the day of this encounter for preparing for, evaluating, treating, and managing this patient.      -Continue current medications and maintain follow up with specialists.  Return to clinic in 6 months or sooner for any concerns   No follow-ups on file.     This note was generated with the assistance of ambient listening technology. Verbal consent was obtained by the patient and accompanying visitor(s) for the recording of patient  appointment to facilitate this note. I attest to having reviewed and edited the generated note for accuracy, though some syntax or spelling errors may persist. Please contact the author of this note for any clarification.        GINA Antonio  [facility]

## 2024-11-06 DIAGNOSIS — Z79.4 TYPE 2 DIABETES MELLITUS WITH HYPERGLYCEMIA, WITH LONG-TERM CURRENT USE OF INSULIN: ICD-10-CM

## 2024-11-06 DIAGNOSIS — E11.65 TYPE 2 DIABETES MELLITUS WITH HYPERGLYCEMIA, WITH LONG-TERM CURRENT USE OF INSULIN: ICD-10-CM

## 2024-11-06 RX ORDER — INSULIN GLARGINE 100 [IU]/ML
INJECTION, SOLUTION SUBCUTANEOUS
Qty: 45 ML | Refills: 1 | Status: SHIPPED | OUTPATIENT
Start: 2024-11-06

## 2024-11-06 NOTE — TELEPHONE ENCOUNTER
Care Due:                  Date            Visit Type   Department     Provider  --------------------------------------------------------------------------------                                EP -                              PRIMARY      [facility]  Last Visit: 03-      CARE ([facility])   LESLEE Amaral  Next Visit: None Scheduled  None         None Found                                                            Last  Test          Frequency    Reason                     Performed    Due Date  --------------------------------------------------------------------------------    CMP.........  12 months..  ergocalciferol,            Not Found    Overdue                             hydroCHLOROthiazide,                             insulin, rosuvastatin....    Lipid Panel.  12 months..  rosuvastatin.............  09- 09-    Vitamin D...  12 months..  ergocalciferol...........  Not Found    Overdue    [employer] Embedded Care Due Messages. Reference number: [id number].   11/06/2024 11:34:46 AM CST

## 2024-11-06 NOTE — TELEPHONE ENCOUNTER
Provider Staff:  Action required for this patient    Requires labs      Please see care gap opportunities below in Care Due Message.    Thanks!  [facility]     Appointments      Date Provider   Last Visit   Visit date not found Andre Llanos MD   Next Visit   Visit date not found Andre Llanos MD     Refill Decision Note   Margarita Orourke  is requesting a refill authorization.  Brief Assessment and Rationale for Refill:  Approve     Medication Therapy Plan:         Comments:     Note composed:2:59 PM 11/06/2024

## 2025-01-13 DIAGNOSIS — Z00.00 ENCOUNTER FOR MEDICARE ANNUAL WELLNESS EXAM: ICD-10-CM

## 2025-02-01 DIAGNOSIS — F41.9 ANXIETY: ICD-10-CM

## 2025-02-01 NOTE — TELEPHONE ENCOUNTER
Care Due:                  Date            Visit Type   Department     Provider  --------------------------------------------------------------------------------                                EP -                              PRIMARY      [facility]  Last Visit: 03-      CARE ([facility])   CARE           Verena Amaral  Next Visit: None Scheduled  None         None Found                                                            Last  Test          Frequency    Reason                     Performed    Due Date  --------------------------------------------------------------------------------    Office Visit  12 months..  ergocalciferol...........  03- 02-    CMP.........  12 months..  LANTUS, ergocalciferol,    Not Found    Overdue                             hydroCHLOROthiazide,                             rosuvastatin.............    HBA1C.......  6 months...  LANTUS...................  10-   04-    Lipid Panel.  12 months..  rosuvastatin.............  09- 02-    Vitamin D...  12 months..  ergocalciferol...........  Not Found    Overdue    [employer] Embedded Care Due Messages. Reference number: [id number].   2/01/2025 5:07:02 PM CST

## 2025-02-03 RX ORDER — ALPRAZOLAM 0.25 MG/1
0.25 TABLET ORAL 3 TIMES DAILY
Qty: 90 TABLET | Refills: 0 | Status: SHIPPED | OUTPATIENT
Start: 2025-02-03

## 2025-02-07 ENCOUNTER — TELEPHONE (OUTPATIENT)
Dept: PRIMARY CARE CLINIC | Facility: CLINIC | Age: 70
End: 2025-02-07
Payer: MEDICARE

## 2025-02-07 NOTE — TELEPHONE ENCOUNTER
----- Message from Jordana sent at 2/7/2025  1:04 PM CST -----  Contact: Juliette  [email or web address]      needs confirmation of patient diabetes in order to retain benefits    Patient is calling for Medical Advice regarding:    How long has patient had these symptoms:    Pharmacy name and phone#:    Patient wants a call back or thru [facility]:    Comments: Juliette [phone] ref:[id number]    Please advise patient replies from provider may take up to 48 hours.

## 2025-03-07 DIAGNOSIS — F41.9 ANXIETY: ICD-10-CM

## 2025-03-07 RX ORDER — ALPRAZOLAM 0.25 MG/1
0.25 TABLET ORAL 3 TIMES DAILY
Qty: 90 TABLET | Refills: 1 | Status: SHIPPED | OUTPATIENT
Start: 2025-03-07

## 2025-03-07 NOTE — TELEPHONE ENCOUNTER
No care due was identified.  [facility] Embedded Care Due Messages. Reference number: [id number].   3/07/2025 2:37:33 PM CST

## 2025-04-09 RX ORDER — CALCIUM CITRATE/VITAMIN D3 200MG-6.25
TABLET ORAL 3 TIMES DAILY
Qty: 300 STRIP | Refills: 3 | Status: SHIPPED | OUTPATIENT
Start: 2025-04-09

## 2025-04-09 NOTE — TELEPHONE ENCOUNTER
Care Due:                  Date            Visit Type   Department     Provider  --------------------------------------------------------------------------------                                EP -                              PRIMARY      [facility]  Last Visit: 03-      CARE ([facility])   CARE           Verena Amaral  Next Visit: None Scheduled  None         None Found                                                            Last  Test          Frequency    Reason                     Performed    Due Date  --------------------------------------------------------------------------------    Office Visit  12 months..  LANTUS, ergocalciferol,    03- 02-                             hydroCHLOROthiazide,                             omeprazole, rosuvastatin.    CMP.........  12 months..  LANTUS, ergocalciferol,    Not Found    Overdue                             hydroCHLOROthiazide,                             rosuvastatin.............    HBA1C.......  6 months...  LANTUS...................  10-   04-    Lipid Panel.  12 months..  rosuvastatin.............  09- 04-    Vitamin D...  12 months..  ergocalciferol...........  Not Found    Overdue    [employer] Embedded Care Due Messages. Reference number: [id number].   4/09/2025 2:18:40 AM CDT

## 2025-04-09 NOTE — TELEPHONE ENCOUNTER
Provider Staff:  Action required for this patient    Requires appointment   Requires labs      Please see care gap opportunities below in Care Due Message.    Thanks!  [facility]     Appointments      Date Provider   Last Visit   3/1/2024 Verena Amaral MD   Next Visit   Visit date not found Verena Amaral MD     Refill Decision Note   Margarita Orourke  is requesting a refill authorization.  Brief Assessment and Rationale for Refill:  Approve     Medication Therapy Plan:         Comments:     Note composed:9:13 AM 04/09/2025

## 2025-04-22 ENCOUNTER — PATIENT OUTREACH (OUTPATIENT)
Dept: ADMINISTRATIVE | Facility: OTHER | Age: 70
End: 2025-04-22
Payer: MEDICARE

## 2025-04-22 ENCOUNTER — OFFICE VISIT (OUTPATIENT)
Dept: FAMILY MEDICINE | Facility: CLINIC | Age: 70
End: 2025-04-22
Payer: MEDICARE

## 2025-04-22 VITALS
HEIGHT: 65 IN | DIASTOLIC BLOOD PRESSURE: 80 MMHG | HEART RATE: 67 BPM | OXYGEN SATURATION: 98 % | BODY MASS INDEX: 29.94 KG/M2 | WEIGHT: 179.69 LBS | SYSTOLIC BLOOD PRESSURE: 126 MMHG

## 2025-04-22 DIAGNOSIS — J44.9 CHRONIC OBSTRUCTIVE PULMONARY DISEASE, UNSPECIFIED COPD TYPE: ICD-10-CM

## 2025-04-22 DIAGNOSIS — J84.89 INTERSTITIAL LUNG DISEASE DUE TO CONNECTIVE TISSUE DISEASE: ICD-10-CM

## 2025-04-22 DIAGNOSIS — E11.69 HYPERLIPIDEMIA DUE TO TYPE 2 DIABETES MELLITUS: ICD-10-CM

## 2025-04-22 DIAGNOSIS — E11.65 TYPE 2 DIABETES MELLITUS WITH HYPERGLYCEMIA, WITH LONG-TERM CURRENT USE OF INSULIN: ICD-10-CM

## 2025-04-22 DIAGNOSIS — Z00.00 ENCOUNTER FOR MEDICARE ANNUAL WELLNESS EXAM: Primary | ICD-10-CM

## 2025-04-22 DIAGNOSIS — Z79.4 TYPE 2 DIABETES MELLITUS WITH HYPERGLYCEMIA, WITH LONG-TERM CURRENT USE OF INSULIN: ICD-10-CM

## 2025-04-22 DIAGNOSIS — E78.5 HYPERLIPIDEMIA DUE TO TYPE 2 DIABETES MELLITUS: ICD-10-CM

## 2025-04-22 DIAGNOSIS — Z59.9 FINANCIAL DIFFICULTIES: ICD-10-CM

## 2025-04-22 DIAGNOSIS — I70.0 ATHEROSCLEROSIS OF AORTA: ICD-10-CM

## 2025-04-22 DIAGNOSIS — F41.9 ANXIETY: ICD-10-CM

## 2025-04-22 DIAGNOSIS — M06.9 RHEUMATOID ARTHRITIS OF OTHER SITE, UNSPECIFIED WHETHER RHEUMATOID FACTOR PRESENT: ICD-10-CM

## 2025-04-22 DIAGNOSIS — D84.821 DRUG-INDUCED IMMUNODEFICIENCY: ICD-10-CM

## 2025-04-22 DIAGNOSIS — F51.01 PRIMARY INSOMNIA: ICD-10-CM

## 2025-04-22 DIAGNOSIS — I15.2 HYPERTENSION ASSOCIATED WITH DIABETES: ICD-10-CM

## 2025-04-22 DIAGNOSIS — E55.9 VITAMIN D DEFICIENCY: ICD-10-CM

## 2025-04-22 DIAGNOSIS — E11.59 HYPERTENSION ASSOCIATED WITH DIABETES: ICD-10-CM

## 2025-04-22 DIAGNOSIS — R41.3 MEMORY IMPAIRMENT: ICD-10-CM

## 2025-04-22 DIAGNOSIS — I27.0 PRIMARY PULMONARY HYPERTENSION: ICD-10-CM

## 2025-04-22 DIAGNOSIS — H40.023 OPEN ANGLE WITH BORDERLINE FINDINGS AND HIGH GLAUCOMA RISK IN BOTH EYES: ICD-10-CM

## 2025-04-22 DIAGNOSIS — M35.9 INTERSTITIAL LUNG DISEASE DUE TO CONNECTIVE TISSUE DISEASE: ICD-10-CM

## 2025-04-22 DIAGNOSIS — Z79.899 DRUG-INDUCED IMMUNODEFICIENCY: ICD-10-CM

## 2025-04-22 PROCEDURE — 99999 PR PBB SHADOW E&M-EST. PATIENT-LVL V: CPT | Mod: PBBFAC,HCNC,, | Performed by: NURSE PRACTITIONER

## 2025-04-22 RX ORDER — CETIRIZINE HYDROCHLORIDE, PSEUDOEPHEDRINE HYDROCHLORIDE 5; 120 MG/1; MG/1
1 TABLET, FILM COATED, EXTENDED RELEASE ORAL 2 TIMES DAILY
COMMUNITY
End: 2025-04-22

## 2025-04-22 RX ORDER — AMBRISENTAN 5 MG/1
1 TABLET, FILM COATED ORAL DAILY
COMMUNITY
Start: 2025-01-27

## 2025-04-22 RX ORDER — PREDNISONE 20 MG/1
TABLET ORAL
COMMUNITY
Start: 2025-01-25

## 2025-04-22 SDOH — SOCIAL DETERMINANTS OF HEALTH (SDOH): PROBLEM RELATED TO HOUSING AND ECONOMIC CIRCUMSTANCES, UNSPECIFIED: Z59.9

## 2025-04-22 NOTE — PROGRESS NOTES
"  Margarita Orourke presented for a  Medicare AWV and comprehensive Health Risk Assessment today. The following components were reviewed and updated:    Medical history  Family History  Social history  Allergies and Current Medications  Health Risk Assessment  Health Maintenance  Care Team         ** See Completed Assessments for Annual Wellness Visit within the encounter summary.**         The following assessments were completed:  Living Situation  CAGE  Depression Screening  Timed Get Up and Go  Whisper Test  Cognitive Function Screening    Nutrition Screening  ADL Screening  PAQ Screening      Opioid documentation for eAWV      Patient does not have a current opioid prescription.        Review for Substance Use Disorders: Patient does not use substance      Current Medications[1]       Vitals:    04/22/25 1019   BP: 126/80   Pulse: 67   SpO2: 98%   Weight: 81.5 kg (179 lb 10.8 oz)   Height: 5' 5" (1.651 m)   PainSc: 0-No pain      Physical Exam  Vitals reviewed.   Constitutional:       General: She is not in acute distress.     Appearance: Normal appearance. She is not ill-appearing.   HENT:      Head: Normocephalic and atraumatic.      Mouth/Throat:      Mouth: Mucous membranes are moist.   Eyes:      General: No scleral icterus.        Right eye: No discharge.         Left eye: No discharge.      Extraocular Movements: Extraocular movements intact.      Conjunctiva/sclera: Conjunctivae normal.   Cardiovascular:      Rate and Rhythm: Normal rate.   Pulmonary:      Effort: Pulmonary effort is normal. No respiratory distress.   Musculoskeletal:      Cervical back: Normal range of motion.   Skin:     General: Skin is warm and dry.   Neurological:      Mental Status: She is alert and oriented to person, place, and time.   Psychiatric:         Mood and Affect: Mood normal.         Behavior: Behavior normal. Behavior is cooperative.         Cognition and Memory: Cognition normal. Memory is impaired.           "     Diagnoses and health risks identified today and associated recommendations/orders:    1. Encounter for Medicare annual wellness exam  - Chart reviewed. Problem list updated. Discussed current medical diagnosis, current medications, medical/surgical/family/social history; updated provider list; documented vital signs; identified any cognitive impairment; and updated risk factor list. Addressed any outstanding health maintenance. Provided patient with personalized health advice. Continue to follow up with PCP and any specialists.   - referred to community health worker for assistance and financial instability  - Ambulatory Referral/Consult to Enhanced Annual Wellness Visit (eAWV)    2. Chronic obstructive pulmonary disease, unspecified COPD type  Chronic; stable on current treatment plan; follow up with PCP  - continue taking symbicort; follow up with pulmonology     3. Rheumatoid arthritis of other site, unspecified whether rheumatoid factor present  Chronic; stable on current treatment plan; follow up with PCP  - continue taking prednisone and methotrexate and hydroxychloroquine  - follow up with rheumatology     4. Primary pulmonary hypertension  Chronic; follow up with pulmonology; stable at this time; continue taking ambrisentan    5. Interstitial lung disease due to connective tissue disease  Chronic; stable; follow up with pulmonology  - continue using inhalers as directed     6. Hypertension associated with diabetes  Chronic; continue metoprolol, hctz, and clonidine     7. Drug-induced immunodeficiency  Chronic; stable; follow up with rheumatology   - taking hydroxychloroquine, methotrexate, and prednisone     8. Atherosclerosis of aorta  Chronic; stable; continue taking statin     9. Hyperlipidemia due to type 2 diabetes mellitus  Chronic; stable; continue taking statin     10. Type 2 diabetes mellitus with hyperglycemia, with long-term current use of insulin  Chronic; stable; HgA1c 7 ; continue taking  lantus    11. Open angle with borderline findings and high glaucoma risk in both eyes  Chronic; stable ; follow up with ophthalmology     12. Vitamin D deficiency  Chronic; continue taking vit d supplements; follow up with pcp     13. Primary insomnia  Chronic; stable; not on daily medication for sleep; follow up with pcp     14. Financial difficulties  Chronic financial difficulties; referral placed to community health worker for assistance     15. Memory impairment  - trouble with cognitive function testing today; trouble with clock and word recall  - declined referral for neuropsychology testing  - patient would like to talk to PCP    16. Anxiety  Chronic; continue zoloft, follow up with pcp     17. BMI 29.0-29.9,adult  - Recommendation for healthy diet and increasing exercise as tolerated with goal of 150min/week       Provided Margarita with a 5-10 year written screening schedule and personal prevention plan. Recommendations were developed using the USPSTF age appropriate recommendations. Education, counseling, and referrals were provided as needed. After Visit Summary printed and given to patient which includes a list of additional screenings\tests needed.    Follow up in about 1 year (around 4/22/2026) for your next medicare wellness visit.    Verena Andrade, FNP-C    Advance Care Planning     I offered to discuss advanced care planning, including how to pick a person who would make decisions for you if you were unable to make them for yourself, called a health care power of , and what kind of decisions you might make such as use of life sustaining treatments such as ventilators and tube feeding when faced with a life limiting illness recorded on a living will that they will need to know. (How you want to be cared for as you near the end of your natural life)     X Patient is interested in learning more about how to make advanced directives.  I provided them paperwork and offered to discuss this with  them.       [1]   Current Outpatient Medications:     albuterol (PROVENTIL/VENTOLIN HFA) 90 mcg/actuation inhaler, INHALE 2 PUFFS INTO THE LUNGS EVERY 6 HOURS AS NEEDED FOR WHEEZING, Disp: 54 g, Rfl: 3    alcohol swabs (ALCOHOL PREP PADS) PadM, Apply 1 each topically 3 (three) times daily., Disp: 300 each, Rfl: 3    ALPRAZolam (XANAX) 0.25 MG tablet, TAKE 1 TABLET BY MOUTH THREE TIMES DAILY, Disp: 90 tablet, Rfl: 1    ambrisentan (LETAIRIS) 5 MG Tab, Take 1 tablet by mouth once daily., Disp: , Rfl:     budesonide-formoterol 160-4.5 mcg (SYMBICORT) 160-4.5 mcg/actuation HFAA, Inhale 2 puffs into the lungs every 12 (twelve) hours. Controller, Disp: , Rfl:     cloNIDine (CATAPRES) 0.1 MG tablet, Take 1 tablet (0.1 mg total) by mouth daily as needed (BP over 160/100)., Disp: 30 tablet, Rfl: 1    clotrimazole-betamethasone 1-0.05% (LOTRISONE) cream, Apply topically 2 (two) times daily., Disp: 45 g, Rfl: 3    ergocalciferol (ERGOCALCIFEROL) 50,000 unit Cap, TAKE 1 CAPSULE BY MOUTH EVERY 7 DAYS, Disp: 12 capsule, Rfl: 3    folic acid (FOLVITE) 1 MG tablet, Take 1 tablet by mouth once daily., Disp: , Rfl:     hydroCHLOROthiazide (HYDRODIURIL) 25 MG tablet, Take 1 tablet (25 mg total) by mouth once daily., Disp: 90 tablet, Rfl: 0    hydrOXYchloroQUINE (PLAQUENIL) 200 mg tablet, Take 200 mg by mouth 2 (two) times a day., Disp: , Rfl:     lancets 30 gauge Misc, 300 lancets by Misc.(Non-Drug; Combo Route) route 3 (three) times daily., Disp: 300 each, Rfl: 3    LANTUS SOLOSTAR U-100 INSULIN 100 unit/mL (3 mL) InPn pen, ADMINISTER 54 UNITS UNDER THE SKIN DAILY, Disp: 45 mL, Rfl: 1    latanoprost 0.005 % ophthalmic solution, , Disp: , Rfl:     METHOTREXATE ORAL, Take 6 tablets by mouth once a week., Disp: , Rfl:     metoprolol succinate (TOPROL-XL) 50 MG 24 hr tablet, Take 50 mg by mouth once daily., Disp: , Rfl:     omeprazole (PRILOSEC) 20 MG capsule, TAKE 1 CAPSULE(20 MG) BY MOUTH EVERY DAY, Disp: 90 capsule, Rfl: 2    pen  "needle, diabetic (BD EDOUARD 2ND GEN PEN NEEDLE) 32 gauge x 5/32" Ndle, USE WITH LANTUS EVERY DAY, Disp: 200 each, Rfl: 5    predniSONE (DELTASONE) 20 MG tablet, TAKE 3 TABLETS BY MOUTH EVERY DAY FOR 5 DAYS, Disp: , Rfl:     rosuvastatin (CRESTOR) 5 MG tablet, Take 1 tablet (5 mg total) by mouth once daily., Disp: 90 tablet, Rfl: 3    sertraline (ZOLOFT) 25 MG tablet, Take 1 tablet (25 mg total) by mouth once daily., Disp: 30 tablet, Rfl: 11    timolol maleate 0.5% (TIMOPTIC-XE) 0.5 % SolG, Place 1 drop into both eyes nightly., Disp: , Rfl:     tiotropium (SPIRIVA WITH HANDIHALER) 18 mcg inhalation capsule,  INL THE CONTENTS OF 1 C INTO THE LUNGS ONCE D. CONTROLLER, Disp: , Rfl:     TRUE METRIX GLUCOSE TEST STRIP Strp, TEST THREE TIMES DAILY, Disp: 300 strip, Rfl: 3    "

## 2025-04-22 NOTE — PATIENT INSTRUCTIONS
Hi Margarita,     If you are due for any health screening(s) below please notify me so we can arrange them to be ordered and scheduled. Most healthy patients at your age complete them, but you are free to accept or refuse.     If you can't do it, I'll definitely understand. If you can, I'd certainly appreciate it!    Tests to Keep You Healthy    Mammogram: ORDERED BUT NOT SCHEDULED  Eye Exam: Met on 5/28/2024  Colon Cancer Screening: Met on 10/10/2019  Last Blood Pressure <= 139/89 (4/22/2025): Yes  Last HbA1c < 8 (10/08/2024): Yes                       Counseling and Referral of Other Preventative  (Italic type indicates deductible and co-insurance are waived)    Patient Name: Margarita Orourke  Today's Date: 4/22/2025    Health Maintenance         Date Due Completion Date    Diabetic Eye Exam 05/28/2025 5/28/2024    Override on 7/10/2019: Done (sees Dr. Esparza at , also has cataracts)    Diabetes Urine Screening 04/23/2025 (Originally 9/13/2023) 9/13/2022    Foot Exam 04/23/2025 (Originally 3/2/2024) 3/2/2023 (Done)    Override on 3/2/2023: Done    Override on 2/23/2022: Done    Override on 12/18/2020: Done    Override on 10/10/2019: Done    Mammogram 04/23/2025 (Originally 3/15/2025) 3/15/2024    Hemoglobin A1c 04/23/2025 (Originally 4/8/2025) 10/8/2024    TETANUS VACCINE 04/24/2025 (Originally [birth year]/2/1973) ---    Shingles Vaccine (1 of 2) 04/24/2025 (Originally [birth year]/2/1974) ---    Influenza Vaccine (1) 04/24/2025 (Originally 9/1/2024) 3/2/2023    COVID-19 Vaccine (5 - 2024-25 season) 04/24/2025 (Originally 9/1/2024) 7/5/2022    RSV Vaccine (Age 60+ and Pregnant patients) (1 - Risk 60-74 years 1-dose series) 04/25/2025 (Originally 8/2/2015) ---    Lipid Panel 11/21/2025 11/21/2024    High Dose Statin 04/22/2026 4/22/2025    DEXA Scan 11/21/2026 11/21/2023    Colorectal Cancer Screening 10/10/2029 10/10/2019 (Done)    Override on 10/10/2019: Done (has cologuard kit)          No orders of the defined types were placed in  this encounter.    The following information is provided to all patients.  This information is to help you find resources for any of the problems found today that may be affecting your health:                  Living healthy guide: www.[facility].[place].[facility]      Understanding Diabetes: www.diabetes.org      Eating healthy: www.cdc.gov/healthyweight      CDC home safety checklist: www.cdc.gov/steadi/patient.html      Agency on Aging: www.goea.[place].[facility]      Alcoholics anonymous (AA): www.aa.org      Physical Activity: www.judson.nih.gov/yr6ueky      Tobacco use: www.quitwithusla.org

## 2025-04-23 PROBLEM — E66.811 CLASS 1 OBESITY DUE TO EXCESS CALORIES WITH SERIOUS COMORBIDITY AND BODY MASS INDEX (BMI) OF 33.0 TO 33.9 IN ADULT: Status: RESOLVED | Noted: 2020-12-18 | Resolved: 2025-04-23

## 2025-04-23 PROBLEM — E66.09 CLASS 1 OBESITY DUE TO EXCESS CALORIES WITH SERIOUS COMORBIDITY AND BODY MASS INDEX (BMI) OF 33.0 TO 33.9 IN ADULT: Status: RESOLVED | Noted: 2020-12-18 | Resolved: 2025-04-23

## 2025-04-28 ENCOUNTER — TELEPHONE (OUTPATIENT)
Dept: PRIMARY CARE CLINIC | Facility: CLINIC | Age: 70
End: 2025-04-28
Payer: MEDICARE

## 2025-04-28 NOTE — TELEPHONE ENCOUNTER
----- Message from Keyla sent at 4/28/2025 12:47 PM CDT -----  .1MEDICALADVICE Patient is calling for Medical Advice regarding: pt daughter Patrick is requesting a call back at  in regards to her mental health and well being. Please  call and advise. How long has patient had these symptoms:Pharmacy name and phone#:Patient wants a call back or thru [facility]:Comments:Please advise patient replies from provider may take up to 48 hours.

## 2025-04-30 NOTE — PROGRESS NOTES
CHW - Initial Contact    This Community Health Worker completed verified updated the Social Determinant of Health questionnaire with patient via telephone today.    Pt identified barriers of most importance are: Food Assistance    Referrals to community agencies completed with patient/caregiver consent outside of [facility] include:  DCFS- SNAP   Support and Services: CHW to assist pt with the completion of a SNAP Application  Other information discussed the patient needs / wants help with: Pt confirmed the need for assistance. Pt stated that she receives $75/ month from [employer]. Pt stated that she has received assistance from [facility] with her utility bills.   Follow up required: Yes, SNAP Appt  Follow-up Outreach - Due: 5/1/2025  Results:  You may be eligible for SNAP benefits.  If you apply and are approved, your benefit may be $292 per month.  Ways to apply for SNAP:

## 2025-05-01 ENCOUNTER — PATIENT OUTREACH (OUTPATIENT)
Dept: ADMINISTRATIVE | Facility: OTHER | Age: 70
End: 2025-05-01
Payer: MEDICARE

## 2025-05-01 NOTE — PROGRESS NOTES
CHW - Follow Up    This Community Health Worker completed a follow up visit with patient via telephone today.  Pt/Caregiver reported: Pt voiced understanding   Community Health Worker provided: CHW assisted pt with the completion of a SNAP Food Application. A complete copy of the application was mailed and emailed to the pt to keep for her records  Follow up required: Yes,   Follow-up Outreach - Due: 5/8/2025     [facility] LOGIN:   E: [email or web address]   U: [email or web address]  P: [email or web address]  PIN: [id number]

## 2025-05-05 DIAGNOSIS — E78.5 HYPERLIPIDEMIA DUE TO TYPE 2 DIABETES MELLITUS: ICD-10-CM

## 2025-05-05 DIAGNOSIS — E11.69 HYPERLIPIDEMIA DUE TO TYPE 2 DIABETES MELLITUS: ICD-10-CM

## 2025-05-05 RX ORDER — ROSUVASTATIN CALCIUM 5 MG/1
5 TABLET, COATED ORAL DAILY
Qty: 90 TABLET | Refills: 3 | Status: SHIPPED | OUTPATIENT
Start: 2025-05-05 | End: 2026-05-05

## 2025-05-05 NOTE — TELEPHONE ENCOUNTER
No care due was identified.  [facility] Embedded Care Due Messages. Reference number: [id number].   5/05/2025 2:43:41 PM CDT

## 2025-05-08 ENCOUNTER — PATIENT OUTREACH (OUTPATIENT)
Dept: ADMINISTRATIVE | Facility: OTHER | Age: 70
End: 2025-05-08
Payer: MEDICARE

## 2025-05-08 NOTE — PROGRESS NOTES
CHW - Follow Up    This Community Health Worker completed a follow up visit with patient via telephone today.  Pt/Caregiver reported: Pt Approved for SNAP and is currently waiting to receive her card in the mail. Pt stated that she received assistance from the [facility] with her utility bills, pts light and water bill has been paid.  Community Health Worker provided: CHW completed f/u with pt  Follow up required: Yes, CC make sure pt received her SNAP Card  Follow-up Outreach - Due: 5/15/2025     Case Category SNAP   Case Status Approved   Benefit Amount $ 199.0   Application Date 05/01/2025   Certification Start Date 05/2025   Certification End Date 04/2028   Last Benefit was issued on 2025-05-07 18:41:04.0

## 2025-05-21 ENCOUNTER — PATIENT MESSAGE (OUTPATIENT)
Dept: ADMINISTRATIVE | Facility: HOSPITAL | Age: 70
End: 2025-05-21
Payer: MEDICARE

## 2025-05-21 NOTE — TELEPHONE ENCOUNTER
Pt is on the MA OPEN PAYOR REPORT: HM marked as done for a Colonoscopy on 10.10.19, but there is no record in pt's chart.    [employer] Message sent to pt for Name and Address of Physician that performed the Colonoscopy.

## 2025-05-26 ENCOUNTER — PATIENT OUTREACH (OUTPATIENT)
Dept: ADMINISTRATIVE | Facility: OTHER | Age: 70
End: 2025-05-26
Payer: MEDICARE

## 2025-05-26 NOTE — PROGRESS NOTES
CHW - Case Closure    This Community Health Worker spoke to patient via telephone today.   Pt/Caregiver reported: Pt received her card and denied needing any other assistance at this time  Pt/Caregiver denied any additional needs at this time and agrees with episode closure at this time.  Provided patient with Community Health Worker's contact information and encouraged him/her to contact this Community Health Worker if additional needs arise.

## 2025-06-05 ENCOUNTER — OFFICE VISIT (OUTPATIENT)
Dept: PRIMARY CARE CLINIC | Facility: CLINIC | Age: 70
End: 2025-06-05
Payer: MEDICARE

## 2025-06-05 VITALS
HEIGHT: 65 IN | WEIGHT: 185.44 LBS | HEART RATE: 69 BPM | BODY MASS INDEX: 30.89 KG/M2 | SYSTOLIC BLOOD PRESSURE: 130 MMHG | OXYGEN SATURATION: 97 % | DIASTOLIC BLOOD PRESSURE: 82 MMHG

## 2025-06-05 DIAGNOSIS — E11.65 TYPE 2 DIABETES MELLITUS WITH HYPERGLYCEMIA, WITH LONG-TERM CURRENT USE OF INSULIN: ICD-10-CM

## 2025-06-05 DIAGNOSIS — E78.5 HYPERLIPIDEMIA DUE TO TYPE 2 DIABETES MELLITUS: ICD-10-CM

## 2025-06-05 DIAGNOSIS — Z00.00 ANNUAL PHYSICAL EXAM: Primary | ICD-10-CM

## 2025-06-05 DIAGNOSIS — Z79.4 TYPE 2 DIABETES MELLITUS WITH HYPERGLYCEMIA, WITH LONG-TERM CURRENT USE OF INSULIN: ICD-10-CM

## 2025-06-05 DIAGNOSIS — M35.9 INTERSTITIAL LUNG DISEASE DUE TO CONNECTIVE TISSUE DISEASE: ICD-10-CM

## 2025-06-05 DIAGNOSIS — J42 CHRONIC BRONCHITIS, UNSPECIFIED CHRONIC BRONCHITIS TYPE: ICD-10-CM

## 2025-06-05 DIAGNOSIS — I70.0 ATHEROSCLEROSIS OF AORTA: ICD-10-CM

## 2025-06-05 DIAGNOSIS — Z12.31 ENCOUNTER FOR SCREENING MAMMOGRAM FOR MALIGNANT NEOPLASM OF BREAST: ICD-10-CM

## 2025-06-05 DIAGNOSIS — F41.9 ANXIETY: ICD-10-CM

## 2025-06-05 DIAGNOSIS — M06.9 RHEUMATOID ARTHRITIS, INVOLVING UNSPECIFIED SITE, UNSPECIFIED WHETHER RHEUMATOID FACTOR PRESENT: ICD-10-CM

## 2025-06-05 DIAGNOSIS — D84.821 DRUG-INDUCED IMMUNODEFICIENCY: ICD-10-CM

## 2025-06-05 DIAGNOSIS — F41.0 PANIC: ICD-10-CM

## 2025-06-05 DIAGNOSIS — I27.0 PRIMARY PULMONARY HYPERTENSION: ICD-10-CM

## 2025-06-05 DIAGNOSIS — I15.2 HYPERTENSION ASSOCIATED WITH DIABETES: ICD-10-CM

## 2025-06-05 DIAGNOSIS — J84.89 INTERSTITIAL LUNG DISEASE DUE TO CONNECTIVE TISSUE DISEASE: ICD-10-CM

## 2025-06-05 DIAGNOSIS — E11.59 HYPERTENSION ASSOCIATED WITH DIABETES: ICD-10-CM

## 2025-06-05 DIAGNOSIS — F51.01 PRIMARY INSOMNIA: ICD-10-CM

## 2025-06-05 DIAGNOSIS — E11.69 HYPERLIPIDEMIA DUE TO TYPE 2 DIABETES MELLITUS: ICD-10-CM

## 2025-06-05 DIAGNOSIS — Z79.899 DRUG-INDUCED IMMUNODEFICIENCY: ICD-10-CM

## 2025-06-05 DIAGNOSIS — I10 ESSENTIAL HYPERTENSION: ICD-10-CM

## 2025-06-05 PROCEDURE — 99999 PR PBB SHADOW E&M-EST. PATIENT-LVL IV: CPT | Mod: PBBFAC,,, | Performed by: INTERNAL MEDICINE

## 2025-06-05 RX ORDER — HYDROCHLOROTHIAZIDE 25 MG/1
25 TABLET ORAL DAILY
Qty: 90 TABLET | Refills: 3 | Status: SHIPPED | OUTPATIENT
Start: 2025-06-05

## 2025-06-05 RX ORDER — SERTRALINE HYDROCHLORIDE 25 MG/1
25 TABLET, FILM COATED ORAL DAILY
Qty: 30 TABLET | Refills: 11 | Status: SHIPPED | OUTPATIENT
Start: 2025-06-05 | End: 2026-06-05

## 2025-06-05 RX ORDER — OMEPRAZOLE 20 MG/1
20 CAPSULE, DELAYED RELEASE ORAL DAILY
Qty: 90 CAPSULE | Refills: 3 | Status: SHIPPED | OUTPATIENT
Start: 2025-06-05

## 2025-06-12 ENCOUNTER — TELEPHONE (OUTPATIENT)
Dept: PRIMARY CARE CLINIC | Facility: CLINIC | Age: 70
End: 2025-06-12
Payer: MEDICARE

## 2025-06-12 ENCOUNTER — LAB VISIT (OUTPATIENT)
Dept: LAB | Facility: HOSPITAL | Age: 70
End: 2025-06-12
Attending: INTERNAL MEDICINE
Payer: MEDICARE

## 2025-06-12 DIAGNOSIS — Z79.4 TYPE 2 DIABETES MELLITUS WITH HYPERGLYCEMIA, WITH LONG-TERM CURRENT USE OF INSULIN: ICD-10-CM

## 2025-06-12 DIAGNOSIS — E11.65 TYPE 2 DIABETES MELLITUS WITH HYPERGLYCEMIA, WITH LONG-TERM CURRENT USE OF INSULIN: ICD-10-CM

## 2025-06-12 LAB
ABSOLUTE EOSINOPHIL (OHS): 0.24 K/UL
ABSOLUTE MONOCYTE (OHS): 0.83 K/UL (ref 0.3–1)
ABSOLUTE NEUTROPHIL COUNT (OHS): 3.98 K/UL (ref 1.8–7.7)
ALBUMIN SERPL BCP-MCNC: 3.7 G/DL (ref 3.5–5.2)
ALBUMIN/CREAT UR: 7.4 UG/MG
ALP SERPL-CCNC: 62 UNIT/L (ref 40–150)
ALT SERPL W/O P-5'-P-CCNC: 10 UNIT/L (ref 10–44)
ANION GAP (OHS): 11 MMOL/L (ref 8–16)
AST SERPL-CCNC: 11 UNIT/L (ref 11–45)
BASOPHILS # BLD AUTO: 0.1 K/UL
BASOPHILS NFR BLD AUTO: 1.2 %
BILIRUB SERPL-MCNC: 0.2 MG/DL (ref 0.1–1)
BUN SERPL-MCNC: 13 MG/DL (ref 8–23)
CALCIUM SERPL-MCNC: 9.6 MG/DL (ref 8.7–10.5)
CHLORIDE SERPL-SCNC: 99 MMOL/L (ref 95–110)
CHOLEST SERPL-MCNC: 135 MG/DL (ref 120–199)
CHOLEST/HDLC SERPL: 2.8 {RATIO} (ref 2–5)
CO2 SERPL-SCNC: 27 MMOL/L (ref 23–29)
CREAT SERPL-MCNC: 0.8 MG/DL (ref 0.5–1.4)
CREAT UR-MCNC: 94 MG/DL (ref 15–325)
EAG (OHS): 171 MG/DL (ref 68–131)
ERYTHROCYTE [DISTWIDTH] IN BLOOD BY AUTOMATED COUNT: 13 % (ref 11.5–14.5)
GFR SERPLBLD CREATININE-BSD FMLA CKD-EPI: >60 ML/MIN/1.73/M2
GLUCOSE SERPL-MCNC: 196 MG/DL (ref 70–110)
HBA1C MFR BLD: 7.6 % (ref 4–5.6)
HCT VFR BLD AUTO: 38.8 % (ref 37–48.5)
HDLC SERPL-MCNC: 49 MG/DL (ref 40–75)
HDLC SERPL: 36.3 % (ref 20–50)
HGB BLD-MCNC: 12.5 GM/DL (ref 12–16)
IMM GRANULOCYTES # BLD AUTO: 0.03 K/UL (ref 0–0.04)
IMM GRANULOCYTES NFR BLD AUTO: 0.4 % (ref 0–0.5)
LDLC SERPL CALC-MCNC: 73.8 MG/DL (ref 63–159)
LYMPHOCYTES # BLD AUTO: 2.88 K/UL (ref 1–4.8)
MCH RBC QN AUTO: 29.8 PG (ref 27–31)
MCHC RBC AUTO-ENTMCNC: 32.2 G/DL (ref 32–36)
MCV RBC AUTO: 92 FL (ref 82–98)
MICROALBUMIN UR-MCNC: 7 UG/ML (ref ?–5000)
NONHDLC SERPL-MCNC: 86 MG/DL
NUCLEATED RBC (/100WBC) (OHS): 0 /100 WBC
PLATELET # BLD AUTO: 243 K/UL (ref 150–450)
PMV BLD AUTO: 10.3 FL (ref 9.2–12.9)
POTASSIUM SERPL-SCNC: 4 MMOL/L (ref 3.5–5.1)
PROT SERPL-MCNC: 7.7 GM/DL (ref 6–8.4)
RBC # BLD AUTO: 4.2 M/UL (ref 4–5.4)
RELATIVE EOSINOPHIL (OHS): 3 %
RELATIVE LYMPHOCYTE (OHS): 35.7 % (ref 18–48)
RELATIVE MONOCYTE (OHS): 10.3 % (ref 4–15)
RELATIVE NEUTROPHIL (OHS): 49.4 % (ref 38–73)
SODIUM SERPL-SCNC: 137 MMOL/L (ref 136–145)
TRIGL SERPL-MCNC: 61 MG/DL (ref 30–150)
WBC # BLD AUTO: 8.06 K/UL (ref 3.9–12.7)

## 2025-06-12 PROCEDURE — 82570 ASSAY OF URINE CREATININE: CPT | Mod: HCNC

## 2025-06-12 PROCEDURE — 83036 HEMOGLOBIN GLYCOSYLATED A1C: CPT | Mod: HCNC

## 2025-06-12 PROCEDURE — 80053 COMPREHEN METABOLIC PANEL: CPT | Mod: HCNC

## 2025-06-12 PROCEDURE — 85025 COMPLETE CBC W/AUTO DIFF WBC: CPT | Mod: HCNC

## 2025-06-12 PROCEDURE — 80061 LIPID PANEL: CPT | Mod: HCNC

## 2025-06-12 PROCEDURE — 36415 COLL VENOUS BLD VENIPUNCTURE: CPT

## 2025-06-13 ENCOUNTER — RESULTS FOLLOW-UP (OUTPATIENT)
Dept: PRIMARY CARE CLINIC | Facility: CLINIC | Age: 70
End: 2025-06-13

## 2025-06-26 DIAGNOSIS — E55.9 VITAMIN D DEFICIENCY: ICD-10-CM

## 2025-06-26 RX ORDER — ERGOCALCIFEROL 1.25 MG/1
50000 CAPSULE ORAL
Qty: 12 CAPSULE | Refills: 3 | Status: SHIPPED | OUTPATIENT
Start: 2025-06-26

## 2025-06-26 NOTE — TELEPHONE ENCOUNTER
Copied from CRM #[id number]. Topic: Medications - Medication Refill  >> Jun 26, 2025 12:00 PM Diamond wrote:  Can the clinic reply in [facility]:        Please refill the medication(s) listed below. Please call the patient when the prescription(s) is ready for  at this phone number   Margarita Orourke  [phone]        Medication #1ergocalciferol (ERGOCALCIFEROL) 50,000 unit Cap             Preferred Pharmacy:   [id number] - [place] - [address] [facility] [address]  [address] [place] [address]  Phone: [phone] Fax: [phone]

## 2025-06-26 NOTE — TELEPHONE ENCOUNTER
Care Due:                  Date            Visit Type   Department     Provider  --------------------------------------------------------------------------------                                EP -                              PRIMARY      [facility] PR[facility]  Last Visit: 06-      CARE ([facility])   CARE           Verena Amaral                              EP -                              PRIMARY      [facility] PRIMARY  Next Visit: 12-      CARE ([facility])   CARE           Verena Amaral                                                            Last  Test          Frequency    Reason                     Performed    Due Date  --------------------------------------------------------------------------------    Vitamin D...  12 months..  ergocalciferol...........  Not Found    Overdue    [employer] Embedded Care Due Messages. Reference number: [id number].   6/26/2025 12:45:46 PM CDT

## 2025-09-02 DIAGNOSIS — F41.9 ANXIETY: ICD-10-CM

## 2025-09-03 RX ORDER — ALPRAZOLAM 0.25 MG/1
0.25 TABLET ORAL 3 TIMES DAILY
Qty: 90 TABLET | Refills: 5 | Status: SHIPPED | OUTPATIENT
Start: 2025-09-03